# Patient Record
Sex: MALE | Race: WHITE | NOT HISPANIC OR LATINO | Employment: OTHER | URBAN - METROPOLITAN AREA
[De-identification: names, ages, dates, MRNs, and addresses within clinical notes are randomized per-mention and may not be internally consistent; named-entity substitution may affect disease eponyms.]

---

## 2019-06-16 ENCOUNTER — HOSPITAL ENCOUNTER (OUTPATIENT)
Dept: URGENT CARE | Facility: CLINIC | Age: 74
Discharge: HOME | End: 2019-06-16
Attending: NURSE PRACTITIONER

## 2019-06-16 LAB — GLUCOSE BLD-MCNC: 590 MG/DL (ref 70–99)

## 2019-07-02 ENCOUNTER — CONVERSION ENCOUNTER (OUTPATIENT)
Dept: FAMILY MEDICINE CLINIC | Facility: CLINIC | Age: 74
End: 2019-07-02

## 2019-07-02 ENCOUNTER — HOSPITAL ENCOUNTER (OUTPATIENT)
Dept: FAMILY MEDICINE CLINIC | Facility: CLINIC | Age: 74
Discharge: HOME | End: 2019-07-02
Attending: NURSE PRACTITIONER

## 2019-07-02 ENCOUNTER — OFFICE VISIT CONVERTED (OUTPATIENT)
Dept: FAMILY MEDICINE CLINIC | Facility: CLINIC | Age: 74
End: 2019-07-02
Attending: NURSE PRACTITIONER

## 2019-07-02 LAB
ALBUMIN SERPL-MCNC: 4 G/DL (ref 3.5–5)
ALBUMIN/GLOB SERPL: 1.5 {RATIO} (ref 1.4–2.6)
ALP SERPL-CCNC: 69 U/L (ref 56–155)
ALT SERPL-CCNC: 20 U/L (ref 10–40)
ANION GAP SERPL CALC-SCNC: 20 MMOL/L (ref 8–19)
AST SERPL-CCNC: 21 U/L (ref 15–50)
BASOPHILS # BLD AUTO: 0.05 10*3/UL (ref 0–0.2)
BASOPHILS NFR BLD AUTO: 0.8 % (ref 0–3)
BILIRUB SERPL-MCNC: 0.39 MG/DL (ref 0.2–1.3)
BUN SERPL-MCNC: 14 MG/DL (ref 5–25)
BUN/CREAT SERPL: 16 {RATIO} (ref 6–20)
CALCIUM SERPL-MCNC: 8.8 MG/DL (ref 8.7–10.4)
CHLORIDE SERPL-SCNC: 101 MMOL/L (ref 99–111)
CHOLEST SERPL-MCNC: 145 MG/DL (ref 107–200)
CHOLEST/HDLC SERPL: 2.8 {RATIO} (ref 3–6)
CONV ABS IMM GRAN: 0.03 10*3/UL (ref 0–0.2)
CONV CO2: 23 MMOL/L (ref 22–32)
CONV CREATININE URINE, RANDOM: 166.4 MG/DL (ref 10–300)
CONV IMMATURE GRAN: 0.5 % (ref 0–1.8)
CONV MICROALBUM.,U,RANDOM: 49.4 MG/L (ref 0–20)
CONV TOTAL PROTEIN: 6.7 G/DL (ref 6.3–8.2)
CREAT UR-MCNC: 0.89 MG/DL (ref 0.7–1.2)
DEPRECATED RDW RBC AUTO: 46.5 FL (ref 35.1–43.9)
EOSINOPHIL # BLD AUTO: 0.09 10*3/UL (ref 0–0.7)
EOSINOPHIL # BLD AUTO: 1.4 % (ref 0–7)
ERYTHROCYTE [DISTWIDTH] IN BLOOD BY AUTOMATED COUNT: 13.9 % (ref 11.6–14.4)
EST. AVERAGE GLUCOSE BLD GHB EST-MCNC: 200 MG/DL
GFR SERPLBLD BASED ON 1.73 SQ M-ARVRAT: >60 ML/MIN/{1.73_M2}
GLOBULIN UR ELPH-MCNC: 2.7 G/DL (ref 2–3.5)
GLUCOSE SERPL-MCNC: 61 MG/DL (ref 70–99)
HBA1C MFR BLD: 12.5 G/DL (ref 14–18)
HBA1C MFR BLD: 8.6 % (ref 3.5–5.7)
HCT VFR BLD AUTO: 39.8 % (ref 42–52)
HDLC SERPL-MCNC: 52 MG/DL (ref 40–60)
LDLC SERPL CALC-MCNC: 68 MG/DL (ref 70–100)
LYMPHOCYTES # BLD AUTO: 1.04 10*3/UL (ref 1–5)
MCH RBC QN AUTO: 28.5 PG (ref 27–31)
MCHC RBC AUTO-ENTMCNC: 31.4 G/DL (ref 33–37)
MCV RBC AUTO: 90.9 FL (ref 80–96)
MICROALBUMIN/CREAT UR: 29.7 MG/G{CRE} (ref 0–25)
MONOCYTES # BLD AUTO: 0.56 10*3/UL (ref 0.2–1.2)
MONOCYTES NFR BLD AUTO: 8.5 % (ref 3–10)
NEUTROPHILS # BLD AUTO: 4.78 10*3/UL (ref 2–8)
NEUTROPHILS NFR BLD AUTO: 72.9 % (ref 30–85)
NRBC CBCN: 0 % (ref 0–0.7)
OSMOLALITY SERPL CALC.SUM OF ELEC: 288 MOSM/KG (ref 273–304)
PLATELET # BLD AUTO: 232 10*3/UL (ref 130–400)
PMV BLD AUTO: 10.7 FL (ref 9.4–12.4)
POTASSIUM SERPL-SCNC: 4.1 MMOL/L (ref 3.5–5.3)
PSA SERPL-MCNC: 1.53 NG/ML (ref 0–4)
RBC # BLD AUTO: 4.38 10*6/UL (ref 4.7–6.1)
SODIUM SERPL-SCNC: 140 MMOL/L (ref 135–147)
TRIGL SERPL-MCNC: 123 MG/DL (ref 40–150)
TSH SERPL-ACNC: 1.8 M[IU]/L (ref 0.27–4.2)
VARIANT LYMPHS NFR BLD MANUAL: 15.9 % (ref 20–45)
VLDLC SERPL-MCNC: 25 MG/DL (ref 5–37)
WBC # BLD AUTO: 6.55 10*3/UL (ref 4.8–10.8)

## 2019-07-03 LAB
FERRITIN SERPL-MCNC: 24 NG/ML (ref 30–300)
IRON SATN MFR SERPL: 19 % (ref 20–55)
IRON SERPL-MCNC: 73 UG/DL (ref 70–180)
TIBC SERPL-MCNC: 376 UG/DL (ref 245–450)
TRANSFERRIN SERPL-MCNC: 263 MG/DL (ref 215–365)

## 2019-07-09 ENCOUNTER — HOSPITAL ENCOUNTER (OUTPATIENT)
Dept: FAMILY MEDICINE CLINIC | Facility: CLINIC | Age: 74
Discharge: HOME | End: 2019-07-09
Attending: NURSE PRACTITIONER

## 2019-07-09 ENCOUNTER — HOSPITAL ENCOUNTER (OUTPATIENT)
Dept: MRI IMAGING | Facility: HOSPITAL | Age: 74
Discharge: HOME | End: 2019-07-09
Attending: NURSE PRACTITIONER

## 2019-07-09 ENCOUNTER — OFFICE VISIT CONVERTED (OUTPATIENT)
Dept: FAMILY MEDICINE CLINIC | Facility: CLINIC | Age: 74
End: 2019-07-09
Attending: NURSE PRACTITIONER

## 2019-07-09 LAB
FOLATE SERPL-MCNC: 18 NG/ML (ref 4.8–20)
VIT B12 SERPL-MCNC: 197 PG/ML (ref 211–911)

## 2019-07-16 ENCOUNTER — OFFICE VISIT CONVERTED (OUTPATIENT)
Dept: FAMILY MEDICINE CLINIC | Facility: CLINIC | Age: 74
End: 2019-07-16
Attending: NURSE PRACTITIONER

## 2019-07-23 ENCOUNTER — OFFICE VISIT CONVERTED (OUTPATIENT)
Dept: FAMILY MEDICINE CLINIC | Facility: CLINIC | Age: 74
End: 2019-07-23
Attending: NURSE PRACTITIONER

## 2019-07-23 ENCOUNTER — CONVERSION ENCOUNTER (OUTPATIENT)
Dept: FAMILY MEDICINE CLINIC | Facility: CLINIC | Age: 74
End: 2019-07-23

## 2019-08-01 ENCOUNTER — HOSPITAL ENCOUNTER (OUTPATIENT)
Dept: CT IMAGING | Facility: HOSPITAL | Age: 74
Discharge: HOME | End: 2019-08-01
Attending: PHYSICIAN ASSISTANT

## 2019-08-01 ENCOUNTER — OFFICE VISIT CONVERTED (OUTPATIENT)
Dept: NEUROSURGERY | Facility: CLINIC | Age: 74
End: 2019-08-01
Attending: PHYSICIAN ASSISTANT

## 2019-08-07 ENCOUNTER — OFFICE VISIT CONVERTED (OUTPATIENT)
Dept: FAMILY MEDICINE CLINIC | Facility: CLINIC | Age: 74
End: 2019-08-07
Attending: NURSE PRACTITIONER

## 2019-08-21 ENCOUNTER — OFFICE VISIT CONVERTED (OUTPATIENT)
Dept: NEUROSURGERY | Facility: CLINIC | Age: 74
End: 2019-08-21
Attending: NEUROLOGICAL SURGERY

## 2019-08-23 ENCOUNTER — CONVERSION ENCOUNTER (OUTPATIENT)
Dept: FAMILY MEDICINE CLINIC | Facility: CLINIC | Age: 74
End: 2019-08-23

## 2019-08-23 ENCOUNTER — HOSPITAL ENCOUNTER (OUTPATIENT)
Dept: FAMILY MEDICINE CLINIC | Facility: CLINIC | Age: 74
Discharge: HOME | End: 2019-08-23
Attending: NURSE PRACTITIONER

## 2019-08-23 ENCOUNTER — OFFICE VISIT CONVERTED (OUTPATIENT)
Dept: FAMILY MEDICINE CLINIC | Facility: CLINIC | Age: 74
End: 2019-08-23
Attending: NURSE PRACTITIONER

## 2019-08-23 LAB
ALBUMIN SERPL-MCNC: 4.9 G/DL (ref 3.5–5)
ALBUMIN/GLOB SERPL: 1.7 {RATIO} (ref 1.4–2.6)
ALP SERPL-CCNC: 75 U/L (ref 56–155)
ALT SERPL-CCNC: 17 U/L (ref 10–40)
ANION GAP SERPL CALC-SCNC: 21 MMOL/L (ref 8–19)
AST SERPL-CCNC: 18 U/L (ref 15–50)
BILIRUB SERPL-MCNC: 0.5 MG/DL (ref 0.2–1.3)
BUN SERPL-MCNC: 18 MG/DL (ref 5–25)
BUN/CREAT SERPL: 21 {RATIO} (ref 6–20)
CALCIUM SERPL-MCNC: 9.8 MG/DL (ref 8.7–10.4)
CHLORIDE SERPL-SCNC: 104 MMOL/L (ref 99–111)
CONV CO2: 24 MMOL/L (ref 22–32)
CONV TOTAL PROTEIN: 7.8 G/DL (ref 6.3–8.2)
CREAT UR-MCNC: 0.85 MG/DL (ref 0.7–1.2)
EST. AVERAGE GLUCOSE BLD GHB EST-MCNC: 143 MG/DL
GFR SERPLBLD BASED ON 1.73 SQ M-ARVRAT: >60 ML/MIN/{1.73_M2}
GLOBULIN UR ELPH-MCNC: 2.9 G/DL (ref 2–3.5)
GLUCOSE SERPL-MCNC: 86 MG/DL (ref 70–99)
HBA1C MFR BLD: 6.6 % (ref 3.5–5.7)
OSMOLALITY SERPL CALC.SUM OF ELEC: 299 MOSM/KG (ref 273–304)
POTASSIUM SERPL-SCNC: 4.8 MMOL/L (ref 3.5–5.3)
SODIUM SERPL-SCNC: 144 MMOL/L (ref 135–147)

## 2019-08-30 ENCOUNTER — PATIENT OUTREACH - CONVERTED (OUTPATIENT)
Dept: FAMILY MEDICINE CLINIC | Facility: CLINIC | Age: 74
End: 2019-08-30
Attending: NURSE PRACTITIONER

## 2019-09-04 ENCOUNTER — CONVERSION ENCOUNTER (OUTPATIENT)
Dept: FAMILY MEDICINE CLINIC | Facility: CLINIC | Age: 74
End: 2019-09-04

## 2019-09-04 ENCOUNTER — OFFICE VISIT CONVERTED (OUTPATIENT)
Dept: FAMILY MEDICINE CLINIC | Facility: CLINIC | Age: 74
End: 2019-09-04
Attending: NURSE PRACTITIONER

## 2019-09-18 ENCOUNTER — OFFICE VISIT CONVERTED (OUTPATIENT)
Dept: FAMILY MEDICINE CLINIC | Facility: CLINIC | Age: 74
End: 2019-09-18
Attending: NURSE PRACTITIONER

## 2019-09-18 ENCOUNTER — CONVERSION ENCOUNTER (OUTPATIENT)
Dept: FAMILY MEDICINE CLINIC | Facility: CLINIC | Age: 74
End: 2019-09-18

## 2019-09-30 ENCOUNTER — PATIENT OUTREACH - CONVERTED (OUTPATIENT)
Dept: FAMILY MEDICINE CLINIC | Facility: CLINIC | Age: 74
End: 2019-09-30
Attending: NURSE PRACTITIONER

## 2019-10-18 ENCOUNTER — OFFICE VISIT CONVERTED (OUTPATIENT)
Dept: FAMILY MEDICINE CLINIC | Facility: CLINIC | Age: 74
End: 2019-10-18
Attending: NURSE PRACTITIONER

## 2019-10-18 ENCOUNTER — CONVERSION ENCOUNTER (OUTPATIENT)
Dept: FAMILY MEDICINE CLINIC | Facility: CLINIC | Age: 74
End: 2019-10-18

## 2019-10-31 ENCOUNTER — PATIENT OUTREACH - CONVERTED (OUTPATIENT)
Dept: FAMILY MEDICINE CLINIC | Facility: CLINIC | Age: 74
End: 2019-10-31
Attending: NURSE PRACTITIONER

## 2019-11-19 ENCOUNTER — OFFICE VISIT CONVERTED (OUTPATIENT)
Dept: FAMILY MEDICINE CLINIC | Facility: CLINIC | Age: 74
End: 2019-11-19
Attending: NURSE PRACTITIONER

## 2019-11-19 ENCOUNTER — CONVERSION ENCOUNTER (OUTPATIENT)
Dept: FAMILY MEDICINE CLINIC | Facility: CLINIC | Age: 74
End: 2019-11-19

## 2019-11-27 ENCOUNTER — PATIENT OUTREACH - CONVERTED (OUTPATIENT)
Dept: FAMILY MEDICINE CLINIC | Facility: CLINIC | Age: 74
End: 2019-11-27
Attending: NURSE PRACTITIONER

## 2019-12-03 ENCOUNTER — OFFICE VISIT CONVERTED (OUTPATIENT)
Dept: FAMILY MEDICINE CLINIC | Facility: CLINIC | Age: 74
End: 2019-12-03
Attending: NURSE PRACTITIONER

## 2019-12-03 ENCOUNTER — HOSPITAL ENCOUNTER (OUTPATIENT)
Dept: FAMILY MEDICINE CLINIC | Facility: CLINIC | Age: 74
Discharge: HOME | End: 2019-12-03
Attending: NURSE PRACTITIONER

## 2019-12-03 LAB
ALBUMIN SERPL-MCNC: 4.1 G/DL (ref 3.5–5)
ALBUMIN/GLOB SERPL: 1.4 {RATIO} (ref 1.4–2.6)
ALP SERPL-CCNC: 102 U/L (ref 56–155)
ALT SERPL-CCNC: 20 U/L (ref 10–40)
ANION GAP SERPL CALC-SCNC: 15 MMOL/L (ref 8–19)
AST SERPL-CCNC: 19 U/L (ref 15–50)
BILIRUB SERPL-MCNC: 0.46 MG/DL (ref 0.2–1.3)
BUN SERPL-MCNC: 14 MG/DL (ref 5–25)
BUN/CREAT SERPL: 16 {RATIO} (ref 6–20)
CALCIUM SERPL-MCNC: 9.9 MG/DL (ref 8.7–10.4)
CHLORIDE SERPL-SCNC: 97 MMOL/L (ref 99–111)
CONV CO2: 25 MMOL/L (ref 22–32)
CONV TOTAL PROTEIN: 7.1 G/DL (ref 6.3–8.2)
CREAT UR-MCNC: 0.88 MG/DL (ref 0.7–1.2)
EST. AVERAGE GLUCOSE BLD GHB EST-MCNC: 214 MG/DL
GFR SERPLBLD BASED ON 1.73 SQ M-ARVRAT: >60 ML/MIN/{1.73_M2}
GLOBULIN UR ELPH-MCNC: 3 G/DL (ref 2–3.5)
GLUCOSE SERPL-MCNC: 343 MG/DL (ref 70–99)
HBA1C MFR BLD: 9.1 % (ref 3.5–5.7)
OSMOLALITY SERPL CALC.SUM OF ELEC: 288 MOSM/KG (ref 273–304)
POTASSIUM SERPL-SCNC: 4.6 MMOL/L (ref 3.5–5.3)
SODIUM SERPL-SCNC: 132 MMOL/L (ref 135–147)

## 2019-12-11 ENCOUNTER — HOSPITAL ENCOUNTER (OUTPATIENT)
Dept: OTHER | Facility: HOSPITAL | Age: 74
Discharge: HOME | End: 2019-12-11
Attending: NURSE PRACTITIONER

## 2019-12-11 LAB
BASOPHILS # BLD AUTO: 0.07 10*3/UL (ref 0–0.2)
BASOPHILS NFR BLD AUTO: 1 % (ref 0–3)
CONV ABS IMM GRAN: 0.03 10*3/UL (ref 0–0.2)
CONV IMMATURE GRAN: 0.4 % (ref 0–1.8)
DEPRECATED RDW RBC AUTO: 42.4 FL (ref 35.1–43.9)
EOSINOPHIL # BLD AUTO: 0.25 10*3/UL (ref 0–0.7)
EOSINOPHIL # BLD AUTO: 3.5 % (ref 0–7)
ERYTHROCYTE [DISTWIDTH] IN BLOOD BY AUTOMATED COUNT: 12.9 % (ref 11.6–14.4)
HCT VFR BLD AUTO: 38.8 % (ref 42–52)
HGB BLD-MCNC: 12.9 G/DL (ref 14–18)
LYMPHOCYTES # BLD AUTO: 1.94 10*3/UL (ref 1–5)
LYMPHOCYTES NFR BLD AUTO: 27.2 % (ref 20–45)
MCH RBC QN AUTO: 29.5 PG (ref 27–31)
MCHC RBC AUTO-ENTMCNC: 33.2 G/DL (ref 33–37)
MCV RBC AUTO: 88.8 FL (ref 80–96)
MONOCYTES # BLD AUTO: 0.49 10*3/UL (ref 0.2–1.2)
MONOCYTES NFR BLD AUTO: 6.9 % (ref 3–10)
NEUTROPHILS # BLD AUTO: 4.34 10*3/UL (ref 2–8)
NEUTROPHILS NFR BLD AUTO: 61 % (ref 30–85)
NRBC CBCN: 0 % (ref 0–0.7)
PLATELET # BLD AUTO: 190 10*3/UL (ref 130–400)
PMV BLD AUTO: 10.5 FL (ref 9.4–12.4)
RBC # BLD AUTO: 4.37 10*6/UL (ref 4.7–6.1)
WBC # BLD AUTO: 7.12 10*3/UL (ref 4.8–10.8)

## 2019-12-17 ENCOUNTER — HOSPITAL ENCOUNTER (OUTPATIENT)
Dept: FAMILY MEDICINE CLINIC | Facility: CLINIC | Age: 74
Discharge: HOME | End: 2019-12-17
Attending: NURSE PRACTITIONER

## 2019-12-17 LAB
FERRITIN SERPL-MCNC: 75 NG/ML (ref 30–300)
FOLATE SERPL-MCNC: 19.2 NG/ML (ref 4.8–20)
IRON SATN MFR SERPL: 22 % (ref 20–55)
IRON SERPL-MCNC: 74 UG/DL (ref 70–180)
TIBC SERPL-MCNC: 330 UG/DL (ref 245–450)
TRANSFERRIN SERPL-MCNC: 231 MG/DL (ref 215–365)
VIT B12 SERPL-MCNC: 227 PG/ML (ref 211–911)

## 2019-12-30 ENCOUNTER — PATIENT OUTREACH - CONVERTED (OUTPATIENT)
Dept: FAMILY MEDICINE CLINIC | Facility: CLINIC | Age: 74
End: 2019-12-30
Attending: NURSE PRACTITIONER

## 2020-01-01 ENCOUNTER — PATIENT OUTREACH - CONVERTED (OUTPATIENT)
Dept: FAMILY MEDICINE CLINIC | Facility: CLINIC | Age: 75
End: 2020-01-01
Attending: NURSE PRACTITIONER

## 2020-01-01 ENCOUNTER — CONVERSION ENCOUNTER (OUTPATIENT)
Dept: FAMILY MEDICINE CLINIC | Facility: CLINIC | Age: 75
End: 2020-01-01

## 2020-01-01 ENCOUNTER — OFFICE VISIT CONVERTED (OUTPATIENT)
Dept: DIABETES SERVICES | Facility: HOSPITAL | Age: 75
End: 2020-01-01
Attending: NURSE PRACTITIONER

## 2020-01-01 ENCOUNTER — HOSPITAL ENCOUNTER (OUTPATIENT)
Dept: DIABETES SERVICES | Facility: HOSPITAL | Age: 75
Discharge: HOME | End: 2020-11-24
Attending: NURSE PRACTITIONER

## 2020-01-01 ENCOUNTER — OFFICE VISIT CONVERTED (OUTPATIENT)
Dept: CARDIOLOGY | Facility: CLINIC | Age: 75
End: 2020-01-01
Attending: SPECIALIST

## 2020-01-01 ENCOUNTER — HOSPITAL ENCOUNTER (OUTPATIENT)
Dept: DIABETES SERVICES | Facility: HOSPITAL | Age: 75
Discharge: HOME | End: 2020-11-09
Attending: NURSE PRACTITIONER

## 2020-01-01 ENCOUNTER — HOSPITAL ENCOUNTER (OUTPATIENT)
Dept: NUCLEAR MEDICINE | Facility: HOSPITAL | Age: 75
Discharge: HOME | End: 2020-08-19
Attending: SPECIALIST

## 2020-01-01 ENCOUNTER — HOSPITAL ENCOUNTER (OUTPATIENT)
Dept: DIABETES SERVICES | Facility: HOSPITAL | Age: 75
Setting detail: RECURRING SERIES
Discharge: STILL ADMITTED OR ADMINISTRATIVE | End: 2020-12-31
Attending: NURSE PRACTITIONER

## 2020-01-01 ENCOUNTER — HOSPITAL ENCOUNTER (OUTPATIENT)
Dept: FAMILY MEDICINE CLINIC | Facility: CLINIC | Age: 75
Discharge: HOME | End: 2020-12-10
Attending: NURSE PRACTITIONER

## 2020-01-01 ENCOUNTER — HOSPITAL ENCOUNTER (OUTPATIENT)
Dept: DIABETES SERVICES | Facility: HOSPITAL | Age: 75
Discharge: HOME | End: 2020-09-08
Attending: NURSE PRACTITIONER

## 2020-01-01 ENCOUNTER — HOSPITAL ENCOUNTER (OUTPATIENT)
Dept: FAMILY MEDICINE CLINIC | Facility: CLINIC | Age: 75
Discharge: HOME | End: 2020-09-10
Attending: NURSE PRACTITIONER

## 2020-01-01 ENCOUNTER — HOSPITAL ENCOUNTER (OUTPATIENT)
Dept: DIABETES SERVICES | Facility: HOSPITAL | Age: 75
Discharge: HOME | End: 2020-12-16
Attending: NURSE PRACTITIONER

## 2020-01-01 LAB
25(OH)D3 SERPL-MCNC: 29.8 NG/ML (ref 30–100)
ALBUMIN SERPL-MCNC: 3.9 G/DL (ref 3.5–5)
ALBUMIN SERPL-MCNC: 4 G/DL (ref 3.5–5)
ALBUMIN/GLOB SERPL: 1.3 {RATIO} (ref 1.4–2.6)
ALBUMIN/GLOB SERPL: 1.4 {RATIO} (ref 1.4–2.6)
ALP SERPL-CCNC: 108 U/L (ref 56–155)
ALP SERPL-CCNC: 80 U/L (ref 56–155)
ALT SERPL-CCNC: 20 U/L (ref 10–40)
ALT SERPL-CCNC: 21 U/L (ref 10–40)
ANION GAP SERPL CALC-SCNC: 14 MMOL/L (ref 8–19)
ANION GAP SERPL CALC-SCNC: 18 MMOL/L (ref 8–19)
AST SERPL-CCNC: 20 U/L (ref 15–50)
AST SERPL-CCNC: 21 U/L (ref 15–50)
BASOPHILS # BLD AUTO: 0.05 10*3/UL (ref 0–0.2)
BASOPHILS NFR BLD AUTO: 0.8 % (ref 0–3)
BILIRUB SERPL-MCNC: 0.36 MG/DL (ref 0.2–1.3)
BILIRUB SERPL-MCNC: 0.37 MG/DL (ref 0.2–1.3)
BUN SERPL-MCNC: 14 MG/DL (ref 5–25)
BUN SERPL-MCNC: 16 MG/DL (ref 5–25)
BUN/CREAT SERPL: 13 {RATIO} (ref 6–20)
BUN/CREAT SERPL: 16 {RATIO} (ref 6–20)
CALCIUM SERPL-MCNC: 9.1 MG/DL (ref 8.7–10.4)
CALCIUM SERPL-MCNC: 9.4 MG/DL (ref 8.7–10.4)
CHLORIDE SERPL-SCNC: 102 MMOL/L (ref 99–111)
CHLORIDE SERPL-SCNC: 99 MMOL/L (ref 99–111)
CHOLEST SERPL-MCNC: 162 MG/DL (ref 107–200)
CHOLEST/HDLC SERPL: 3.4 {RATIO} (ref 3–6)
CONV ABS IMM GRAN: 0.03 10*3/UL (ref 0–0.2)
CONV CO2: 24 MMOL/L (ref 22–32)
CONV CO2: 26 MMOL/L (ref 22–32)
CONV CREATININE URINE, RANDOM: 143.1 MG/DL (ref 10–300)
CONV IMMATURE GRAN: 0.5 % (ref 0–1.8)
CONV MICROALBUM.,U,RANDOM: <12 MG/L (ref 0–20)
CONV TOTAL PROTEIN: 6.8 G/DL (ref 6.3–8.2)
CONV TOTAL PROTEIN: 6.9 G/DL (ref 6.3–8.2)
CREAT UR-MCNC: 1 MG/DL (ref 0.7–1.2)
CREAT UR-MCNC: 1.11 MG/DL (ref 0.7–1.2)
DEPRECATED RDW RBC AUTO: 44.6 FL (ref 35.1–43.9)
EOSINOPHIL # BLD AUTO: 0.26 10*3/UL (ref 0–0.7)
EOSINOPHIL # BLD AUTO: 4.1 % (ref 0–7)
ERYTHROCYTE [DISTWIDTH] IN BLOOD BY AUTOMATED COUNT: 13.6 % (ref 11.6–14.4)
EST. AVERAGE GLUCOSE BLD GHB EST-MCNC: 194 MG/DL
EST. AVERAGE GLUCOSE BLD GHB EST-MCNC: 229 MG/DL
FERRITIN SERPL-MCNC: 91 NG/ML (ref 30–300)
FOLATE SERPL-MCNC: 15.2 NG/ML (ref 4.8–20)
GFR SERPLBLD BASED ON 1.73 SQ M-ARVRAT: >60 ML/MIN/{1.73_M2}
GFR SERPLBLD BASED ON 1.73 SQ M-ARVRAT: >60 ML/MIN/{1.73_M2}
GLOBULIN UR ELPH-MCNC: 2.8 G/DL (ref 2–3.5)
GLOBULIN UR ELPH-MCNC: 3 G/DL (ref 2–3.5)
GLUCOSE SERPL-MCNC: 280 MG/DL (ref 70–99)
GLUCOSE SERPL-MCNC: 290 MG/DL (ref 70–99)
HBA1C MFR BLD: 8.4 % (ref 3.5–5.7)
HBA1C MFR BLD: 9.6 % (ref 3.5–5.7)
HCT VFR BLD AUTO: 41.5 % (ref 42–52)
HDLC SERPL-MCNC: 48 MG/DL (ref 40–60)
HGB BLD-MCNC: 13.5 G/DL (ref 14–18)
IRON SATN MFR SERPL: 29 % (ref 20–55)
IRON SERPL-MCNC: 95 UG/DL (ref 70–180)
LDLC SERPL CALC-MCNC: 66 MG/DL (ref 70–100)
LYMPHOCYTES # BLD AUTO: 1.97 10*3/UL (ref 1–5)
LYMPHOCYTES NFR BLD AUTO: 31.2 % (ref 20–45)
MCH RBC QN AUTO: 29.5 PG (ref 27–31)
MCHC RBC AUTO-ENTMCNC: 32.5 G/DL (ref 33–37)
MCV RBC AUTO: 90.6 FL (ref 80–96)
MICROALBUMIN/CREAT UR: 8.4 MG/G{CRE} (ref 0–25)
MONOCYTES # BLD AUTO: 0.54 10*3/UL (ref 0.2–1.2)
MONOCYTES NFR BLD AUTO: 8.6 % (ref 3–10)
NEUTROPHILS # BLD AUTO: 3.46 10*3/UL (ref 2–8)
NEUTROPHILS NFR BLD AUTO: 54.8 % (ref 30–85)
NRBC CBCN: 0 % (ref 0–0.7)
OSMOLALITY SERPL CALC.SUM OF ELEC: 291 MOSM/KG (ref 273–304)
OSMOLALITY SERPL CALC.SUM OF ELEC: 299 MOSM/KG (ref 273–304)
PLATELET # BLD AUTO: 199 10*3/UL (ref 130–400)
PMV BLD AUTO: 10.5 FL (ref 9.4–12.4)
POTASSIUM SERPL-SCNC: 4.3 MMOL/L (ref 3.5–5.3)
POTASSIUM SERPL-SCNC: 4.5 MMOL/L (ref 3.5–5.3)
PSA SERPL-MCNC: 1.65 NG/ML (ref 0–4)
RBC # BLD AUTO: 4.58 10*6/UL (ref 4.7–6.1)
SODIUM SERPL-SCNC: 135 MMOL/L (ref 135–147)
SODIUM SERPL-SCNC: 139 MMOL/L (ref 135–147)
TIBC SERPL-MCNC: 325 UG/DL (ref 245–450)
TRANSFERRIN SERPL-MCNC: 227 MG/DL (ref 215–365)
TRIGL SERPL-MCNC: 242 MG/DL (ref 40–150)
TSH SERPL-ACNC: 1.9 M[IU]/L (ref 0.27–4.2)
VIT B12 SERPL-MCNC: 264 PG/ML (ref 211–911)
VIT B12 SERPL-MCNC: 299 PG/ML (ref 211–911)
VLDLC SERPL-MCNC: 48 MG/DL (ref 5–37)
WBC # BLD AUTO: 6.31 10*3/UL (ref 4.8–10.8)

## 2020-01-02 ENCOUNTER — HOSPITAL ENCOUNTER (OUTPATIENT)
Dept: DIABETES SERVICES | Facility: HOSPITAL | Age: 75
Setting detail: RECURRING SERIES
Discharge: HOME | End: 2020-01-31
Attending: NURSE PRACTITIONER

## 2020-01-07 ENCOUNTER — OFFICE VISIT CONVERTED (OUTPATIENT)
Dept: FAMILY MEDICINE CLINIC | Facility: CLINIC | Age: 75
End: 2020-01-07
Attending: NURSE PRACTITIONER

## 2020-01-08 ENCOUNTER — HOSPITAL ENCOUNTER (OUTPATIENT)
Dept: DIABETES SERVICES | Facility: HOSPITAL | Age: 75
Discharge: HOME | End: 2020-01-08
Attending: NURSE PRACTITIONER

## 2020-01-08 ENCOUNTER — OFFICE VISIT CONVERTED (OUTPATIENT)
Dept: DIABETES SERVICES | Facility: HOSPITAL | Age: 75
End: 2020-01-08
Attending: NURSE PRACTITIONER

## 2020-01-28 ENCOUNTER — PATIENT OUTREACH - CONVERTED (OUTPATIENT)
Dept: FAMILY MEDICINE CLINIC | Facility: CLINIC | Age: 75
End: 2020-01-28
Attending: NURSE PRACTITIONER

## 2020-02-06 ENCOUNTER — HOSPITAL ENCOUNTER (OUTPATIENT)
Dept: DIABETES SERVICES | Facility: HOSPITAL | Age: 75
Discharge: HOME | End: 2020-02-06
Attending: NURSE PRACTITIONER

## 2020-02-06 ENCOUNTER — OFFICE VISIT CONVERTED (OUTPATIENT)
Dept: DIABETES SERVICES | Facility: HOSPITAL | Age: 75
End: 2020-02-06
Attending: NURSE PRACTITIONER

## 2020-02-07 ENCOUNTER — OFFICE VISIT CONVERTED (OUTPATIENT)
Dept: FAMILY MEDICINE CLINIC | Facility: CLINIC | Age: 75
End: 2020-02-07
Attending: NURSE PRACTITIONER

## 2020-02-13 ENCOUNTER — HOSPITAL ENCOUNTER (OUTPATIENT)
Dept: CT IMAGING | Facility: HOSPITAL | Age: 75
Discharge: HOME | End: 2020-02-13
Attending: NURSE PRACTITIONER

## 2020-02-26 ENCOUNTER — HOSPITAL ENCOUNTER (OUTPATIENT)
Dept: ULTRASOUND IMAGING | Facility: HOSPITAL | Age: 75
Discharge: HOME | End: 2020-02-26
Attending: NURSE PRACTITIONER

## 2020-02-27 ENCOUNTER — PATIENT OUTREACH - CONVERTED (OUTPATIENT)
Dept: FAMILY MEDICINE CLINIC | Facility: CLINIC | Age: 75
End: 2020-02-27
Attending: NURSE PRACTITIONER

## 2020-03-25 ENCOUNTER — OFFICE VISIT CONVERTED (OUTPATIENT)
Dept: DIABETES SERVICES | Facility: HOSPITAL | Age: 75
End: 2020-03-25
Attending: NURSE PRACTITIONER

## 2020-06-03 ENCOUNTER — HOSPITAL ENCOUNTER (OUTPATIENT)
Dept: OTHER | Facility: HOSPITAL | Age: 75
Discharge: HOME | End: 2020-06-03
Attending: NURSE PRACTITIONER

## 2020-06-03 LAB
EST. AVERAGE GLUCOSE BLD GHB EST-MCNC: 200 MG/DL
GLUCOSE SERPL-MCNC: 100 MG/DL (ref 70–99)
HBA1C MFR BLD: 8.6 % (ref 3.5–5.7)

## 2020-06-04 LAB — C PEPTIDE SERPL-MCNC: 0.7 NG/ML (ref 1.1–4.4)

## 2020-06-17 ENCOUNTER — HOSPITAL ENCOUNTER (OUTPATIENT)
Dept: DIABETES SERVICES | Facility: HOSPITAL | Age: 75
Discharge: HOME | End: 2020-06-17
Attending: NURSE PRACTITIONER

## 2020-06-17 ENCOUNTER — HOSPITAL ENCOUNTER (OUTPATIENT)
Dept: FAMILY MEDICINE CLINIC | Facility: CLINIC | Age: 75
Discharge: HOME | End: 2020-06-17
Attending: NURSE PRACTITIONER

## 2020-06-17 ENCOUNTER — OFFICE VISIT CONVERTED (OUTPATIENT)
Dept: DIABETES SERVICES | Facility: HOSPITAL | Age: 75
End: 2020-06-17
Attending: NURSE PRACTITIONER

## 2020-06-17 ENCOUNTER — OFFICE VISIT CONVERTED (OUTPATIENT)
Dept: FAMILY MEDICINE CLINIC | Facility: CLINIC | Age: 75
End: 2020-06-17
Attending: NURSE PRACTITIONER

## 2020-06-17 LAB
ALBUMIN SERPL-MCNC: 4 G/DL (ref 3.5–5)
ALBUMIN/GLOB SERPL: 1.4 {RATIO} (ref 1.4–2.6)
ALP SERPL-CCNC: 71 U/L (ref 56–155)
ALT SERPL-CCNC: 17 U/L (ref 10–40)
ANION GAP SERPL CALC-SCNC: 15 MMOL/L (ref 8–19)
AST SERPL-CCNC: 17 U/L (ref 15–50)
BASOPHILS # BLD AUTO: 0.05 10*3/UL (ref 0–0.2)
BASOPHILS NFR BLD AUTO: 0.9 % (ref 0–3)
BILIRUB SERPL-MCNC: 0.44 MG/DL (ref 0.2–1.3)
BUN SERPL-MCNC: 21 MG/DL (ref 5–25)
BUN/CREAT SERPL: 21 {RATIO} (ref 6–20)
CALCIUM SERPL-MCNC: 9.1 MG/DL (ref 8.7–10.4)
CHLORIDE SERPL-SCNC: 104 MMOL/L (ref 99–111)
CHOLEST SERPL-MCNC: 143 MG/DL (ref 107–200)
CHOLEST/HDLC SERPL: 2.3 {RATIO} (ref 3–6)
CONV ABS IMM GRAN: 0.01 10*3/UL (ref 0–0.2)
CONV CO2: 25 MMOL/L (ref 22–32)
CONV IMMATURE GRAN: 0.2 % (ref 0–1.8)
CONV TOTAL PROTEIN: 6.8 G/DL (ref 6.3–8.2)
CREAT UR-MCNC: 1 MG/DL (ref 0.7–1.2)
DEPRECATED RDW RBC AUTO: 45.1 FL (ref 35.1–43.9)
EOSINOPHIL # BLD AUTO: 0.22 10*3/UL (ref 0–0.7)
EOSINOPHIL # BLD AUTO: 4 % (ref 0–7)
ERYTHROCYTE [DISTWIDTH] IN BLOOD BY AUTOMATED COUNT: 13.4 % (ref 11.6–14.4)
EST. AVERAGE GLUCOSE BLD GHB EST-MCNC: 209 MG/DL
FERRITIN SERPL-MCNC: 39 NG/ML (ref 30–300)
FOLATE SERPL-MCNC: 18.9 NG/ML (ref 4.8–20)
GFR SERPLBLD BASED ON 1.73 SQ M-ARVRAT: >60 ML/MIN/{1.73_M2}
GLOBULIN UR ELPH-MCNC: 2.8 G/DL (ref 2–3.5)
GLUCOSE SERPL-MCNC: 173 MG/DL (ref 70–99)
HBA1C MFR BLD: 8.9 % (ref 3.5–5.7)
HCT VFR BLD AUTO: 42.6 % (ref 42–52)
HDLC SERPL-MCNC: 61 MG/DL (ref 40–60)
HGB BLD-MCNC: 13.4 G/DL (ref 14–18)
IRON SATN MFR SERPL: 25 % (ref 20–55)
IRON SERPL-MCNC: 100 UG/DL (ref 70–180)
LDLC SERPL CALC-MCNC: 68 MG/DL (ref 70–100)
LYMPHOCYTES # BLD AUTO: 1.61 10*3/UL (ref 1–5)
LYMPHOCYTES NFR BLD AUTO: 29.4 % (ref 20–45)
MCH RBC QN AUTO: 28.8 PG (ref 27–31)
MCHC RBC AUTO-ENTMCNC: 31.5 G/DL (ref 33–37)
MCV RBC AUTO: 91.6 FL (ref 80–96)
MONOCYTES # BLD AUTO: 0.48 10*3/UL (ref 0.2–1.2)
MONOCYTES NFR BLD AUTO: 8.8 % (ref 3–10)
NEUTROPHILS # BLD AUTO: 3.1 10*3/UL (ref 2–8)
NEUTROPHILS NFR BLD AUTO: 56.7 % (ref 30–85)
NRBC CBCN: 0 % (ref 0–0.7)
OSMOLALITY SERPL CALC.SUM OF ELEC: 297 MOSM/KG (ref 273–304)
PLATELET # BLD AUTO: 194 10*3/UL (ref 130–400)
PMV BLD AUTO: 11 FL (ref 9.4–12.4)
POTASSIUM SERPL-SCNC: 4.1 MMOL/L (ref 3.5–5.3)
RBC # BLD AUTO: 4.65 10*6/UL (ref 4.7–6.1)
SODIUM SERPL-SCNC: 140 MMOL/L (ref 135–147)
TIBC SERPL-MCNC: 408 UG/DL (ref 245–450)
TRANSFERRIN SERPL-MCNC: 285 MG/DL (ref 215–365)
TRIGL SERPL-MCNC: 68 MG/DL (ref 40–150)
VIT B12 SERPL-MCNC: 232 PG/ML (ref 211–911)
VLDLC SERPL-MCNC: 14 MG/DL (ref 5–37)
WBC # BLD AUTO: 5.47 10*3/UL (ref 4.8–10.8)

## 2020-06-29 ENCOUNTER — PATIENT OUTREACH - CONVERTED (OUTPATIENT)
Dept: FAMILY MEDICINE CLINIC | Facility: CLINIC | Age: 75
End: 2020-06-29
Attending: NURSE PRACTITIONER

## 2020-07-06 ENCOUNTER — OFFICE VISIT CONVERTED (OUTPATIENT)
Dept: UROLOGY | Facility: CLINIC | Age: 75
End: 2020-07-06
Attending: UROLOGY

## 2020-07-06 ENCOUNTER — CONVERSION ENCOUNTER (OUTPATIENT)
Dept: SURGERY | Facility: CLINIC | Age: 75
End: 2020-07-06

## 2020-07-20 ENCOUNTER — HOSPITAL ENCOUNTER (OUTPATIENT)
Dept: OTHER | Facility: HOSPITAL | Age: 75
Discharge: HOME | End: 2020-07-20
Attending: NURSE PRACTITIONER

## 2020-07-20 LAB
ALBUMIN SERPL-MCNC: 4.2 G/DL (ref 3.5–5)
ALBUMIN/GLOB SERPL: 1.4 {RATIO} (ref 1.4–2.6)
ALP SERPL-CCNC: 86 U/L (ref 56–155)
ALT SERPL-CCNC: 17 U/L (ref 10–40)
ANION GAP SERPL CALC-SCNC: 18 MMOL/L (ref 8–19)
AST SERPL-CCNC: 12 U/L (ref 15–50)
BILIRUB SERPL-MCNC: 0.48 MG/DL (ref 0.2–1.3)
BNP SERPL-MCNC: 463 PG/ML (ref 0–900)
BUN SERPL-MCNC: 15 MG/DL (ref 5–25)
BUN/CREAT SERPL: 14 {RATIO} (ref 6–20)
CALCIUM SERPL-MCNC: 9.7 MG/DL (ref 8.7–10.4)
CHLORIDE SERPL-SCNC: 99 MMOL/L (ref 99–111)
CONV CO2: 26 MMOL/L (ref 22–32)
CONV TOTAL PROTEIN: 7.1 G/DL (ref 6.3–8.2)
CREAT UR-MCNC: 1.07 MG/DL (ref 0.7–1.2)
GFR SERPLBLD BASED ON 1.73 SQ M-ARVRAT: >60 ML/MIN/{1.73_M2}
GLOBULIN UR ELPH-MCNC: 2.9 G/DL (ref 2–3.5)
GLUCOSE SERPL-MCNC: 304 MG/DL (ref 70–99)
OSMOLALITY SERPL CALC.SUM OF ELEC: 300 MOSM/KG (ref 273–304)
POTASSIUM SERPL-SCNC: 4.1 MMOL/L (ref 3.5–5.3)
SODIUM SERPL-SCNC: 139 MMOL/L (ref 135–147)

## 2020-07-21 ENCOUNTER — OFFICE VISIT CONVERTED (OUTPATIENT)
Dept: CARDIOLOGY | Facility: CLINIC | Age: 75
End: 2020-07-21
Attending: SPECIALIST

## 2020-07-21 ENCOUNTER — OFFICE VISIT CONVERTED (OUTPATIENT)
Dept: FAMILY MEDICINE CLINIC | Facility: CLINIC | Age: 75
End: 2020-07-21
Attending: NURSE PRACTITIONER

## 2020-07-28 ENCOUNTER — PATIENT OUTREACH - CONVERTED (OUTPATIENT)
Dept: FAMILY MEDICINE CLINIC | Facility: CLINIC | Age: 75
End: 2020-07-28
Attending: NURSE PRACTITIONER

## 2020-07-29 ENCOUNTER — HOSPITAL ENCOUNTER (OUTPATIENT)
Dept: DIABETES SERVICES | Facility: HOSPITAL | Age: 75
Discharge: HOME | End: 2020-07-29
Attending: NURSE PRACTITIONER

## 2020-07-29 ENCOUNTER — OFFICE VISIT CONVERTED (OUTPATIENT)
Dept: DIABETES SERVICES | Facility: HOSPITAL | Age: 75
End: 2020-07-29
Attending: NURSE PRACTITIONER

## 2021-01-01 ENCOUNTER — APPOINTMENT (OUTPATIENT)
Dept: GENERAL RADIOLOGY | Facility: HOSPITAL | Age: 76
End: 2021-01-01

## 2021-01-01 ENCOUNTER — OFFICE VISIT CONVERTED (OUTPATIENT)
Dept: ORTHOPEDIC SURGERY | Facility: CLINIC | Age: 76
End: 2021-01-01
Attending: ORTHOPAEDIC SURGERY

## 2021-01-01 ENCOUNTER — HOSPITAL ENCOUNTER (INPATIENT)
Facility: HOSPITAL | Age: 76
LOS: 10 days | End: 2021-08-01
Attending: EMERGENCY MEDICINE | Admitting: INTERNAL MEDICINE

## 2021-01-01 ENCOUNTER — TRANSCRIBE ORDERS (OUTPATIENT)
Dept: LAB | Facility: HOSPITAL | Age: 76
End: 2021-01-01

## 2021-01-01 ENCOUNTER — OFFICE VISIT CONVERTED (OUTPATIENT)
Dept: DIABETES SERVICES | Facility: HOSPITAL | Age: 76
End: 2021-01-01
Attending: NURSE PRACTITIONER

## 2021-01-01 ENCOUNTER — HOSPITAL ENCOUNTER (OUTPATIENT)
Facility: HOSPITAL | Age: 76
Discharge: HOME | End: 2021-07-14
Attending: ORTHOPAEDIC SURGERY | Admitting: ORTHOPAEDIC SURGERY

## 2021-01-01 ENCOUNTER — APPOINTMENT (OUTPATIENT)
Dept: DIABETES SERVICES | Facility: HOSPITAL | Age: 76
End: 2021-01-01

## 2021-01-01 ENCOUNTER — HOSPITAL ENCOUNTER (OUTPATIENT)
Dept: DIABETES SERVICES | Facility: HOSPITAL | Age: 76
Discharge: HOME | End: 2021-03-02
Attending: NURSE PRACTITIONER

## 2021-01-01 ENCOUNTER — OFFICE VISIT CONVERTED (OUTPATIENT)
Dept: FAMILY MEDICINE CLINIC | Facility: CLINIC | Age: 76
End: 2021-01-01
Attending: NURSE PRACTITIONER

## 2021-01-01 ENCOUNTER — CONVERSION ENCOUNTER (OUTPATIENT)
Dept: FAMILY MEDICINE CLINIC | Facility: CLINIC | Age: 76
End: 2021-01-01

## 2021-01-01 ENCOUNTER — APPOINTMENT (OUTPATIENT)
Dept: LAB | Facility: HOSPITAL | Age: 76
End: 2021-01-01

## 2021-01-01 ENCOUNTER — CONVERSION ENCOUNTER (OUTPATIENT)
Dept: ORTHOPEDIC SURGERY | Facility: CLINIC | Age: 76
End: 2021-01-01

## 2021-01-01 ENCOUNTER — OFFICE VISIT (OUTPATIENT)
Dept: FAMILY MEDICINE CLINIC | Facility: CLINIC | Age: 76
End: 2021-01-01

## 2021-01-01 ENCOUNTER — HOSPITAL ENCOUNTER (OUTPATIENT)
Dept: DIABETES SERVICES | Facility: HOSPITAL | Age: 76
Discharge: HOME | End: 2021-04-21
Attending: NURSE PRACTITIONER

## 2021-01-01 ENCOUNTER — HOSPITAL ENCOUNTER (OUTPATIENT)
Dept: GENERAL RADIOLOGY | Facility: HOSPITAL | Age: 76
Discharge: HOME | End: 2021-01-12
Attending: NURSE PRACTITIONER

## 2021-01-01 ENCOUNTER — OFFICE VISIT (OUTPATIENT)
Dept: DIABETES SERVICES | Facility: HOSPITAL | Age: 76
End: 2021-01-01

## 2021-01-01 ENCOUNTER — PATIENT OUTREACH (OUTPATIENT)
Dept: CASE MANAGEMENT | Facility: OTHER | Age: 76
End: 2021-01-01

## 2021-01-01 ENCOUNTER — ANESTHESIA (OUTPATIENT)
Dept: PERIOP | Facility: HOSPITAL | Age: 76
End: 2021-01-01

## 2021-01-01 ENCOUNTER — ANESTHESIA EVENT (OUTPATIENT)
Dept: PERIOP | Facility: HOSPITAL | Age: 76
End: 2021-01-01

## 2021-01-01 ENCOUNTER — OFFICE VISIT (OUTPATIENT)
Dept: CARDIOLOGY | Facility: CLINIC | Age: 76
End: 2021-01-01

## 2021-01-01 ENCOUNTER — LAB (OUTPATIENT)
Dept: LAB | Facility: HOSPITAL | Age: 76
End: 2021-01-01

## 2021-01-01 ENCOUNTER — HOSPITAL ENCOUNTER (OUTPATIENT)
Dept: FAMILY MEDICINE CLINIC | Facility: CLINIC | Age: 76
Discharge: HOME | End: 2021-04-15
Attending: NURSE PRACTITIONER

## 2021-01-01 ENCOUNTER — HOSPITAL ENCOUNTER (OUTPATIENT)
Dept: DIABETES SERVICES | Facility: HOSPITAL | Age: 76
Discharge: HOME | End: 2021-05-13
Attending: NURSE PRACTITIONER

## 2021-01-01 ENCOUNTER — HOSPITAL ENCOUNTER (OUTPATIENT)
Dept: GENERAL RADIOLOGY | Facility: HOSPITAL | Age: 76
Discharge: HOME | End: 2021-04-27
Attending: NURSE PRACTITIONER

## 2021-01-01 ENCOUNTER — HOSPITAL ENCOUNTER (OUTPATIENT)
Facility: HOSPITAL | Age: 76
Setting detail: HOSPITAL OUTPATIENT SURGERY
End: 2021-01-01
Attending: ORTHOPAEDIC SURGERY | Admitting: ORTHOPAEDIC SURGERY

## 2021-01-01 ENCOUNTER — APPOINTMENT (OUTPATIENT)
Dept: CT IMAGING | Facility: HOSPITAL | Age: 76
End: 2021-01-01

## 2021-01-01 ENCOUNTER — HOSPITAL ENCOUNTER (OUTPATIENT)
Dept: CT IMAGING | Facility: HOSPITAL | Age: 76
Discharge: HOME | End: 2021-02-26
Attending: NURSE PRACTITIONER

## 2021-01-01 ENCOUNTER — APPOINTMENT (OUTPATIENT)
Dept: NEUROLOGY | Facility: HOSPITAL | Age: 76
End: 2021-01-01

## 2021-01-01 ENCOUNTER — PREP FOR SURGERY (OUTPATIENT)
Dept: OTHER | Facility: HOSPITAL | Age: 76
End: 2021-01-01

## 2021-01-01 ENCOUNTER — HOSPITAL ENCOUNTER (OUTPATIENT)
Dept: DIABETES SERVICES | Facility: HOSPITAL | Age: 76
Discharge: HOME | End: 2021-01-19
Attending: NURSE PRACTITIONER

## 2021-01-01 ENCOUNTER — OFFICE VISIT CONVERTED (OUTPATIENT)
Dept: ORTHOPEDIC SURGERY | Facility: CLINIC | Age: 76
End: 2021-01-01
Attending: PHYSICIAN ASSISTANT

## 2021-01-01 ENCOUNTER — TELEPHONE (OUTPATIENT)
Dept: CARDIOLOGY | Facility: CLINIC | Age: 76
End: 2021-01-01

## 2021-01-01 ENCOUNTER — PRE-ADMISSION TESTING (OUTPATIENT)
Dept: PREADMISSION TESTING | Facility: HOSPITAL | Age: 76
End: 2021-01-01

## 2021-01-01 ENCOUNTER — APPOINTMENT (OUTPATIENT)
Dept: PREADMISSION TESTING | Facility: HOSPITAL | Age: 76
End: 2021-01-01

## 2021-01-01 VITALS
DIASTOLIC BLOOD PRESSURE: 63 MMHG | BODY MASS INDEX: 37.59 KG/M2 | SYSTOLIC BLOOD PRESSURE: 152 MMHG | HEIGHT: 68 IN | WEIGHT: 248 LBS

## 2021-01-01 VITALS — BODY MASS INDEX: 36.73 KG/M2 | HEART RATE: 77 BPM | WEIGHT: 242.37 LBS | OXYGEN SATURATION: 98 % | HEIGHT: 68 IN

## 2021-01-01 VITALS
TEMPERATURE: 97.8 F | DIASTOLIC BLOOD PRESSURE: 78 MMHG | DIASTOLIC BLOOD PRESSURE: 72 MMHG | BODY MASS INDEX: 35.85 KG/M2 | BODY MASS INDEX: 36.51 KG/M2 | HEART RATE: 56 BPM | RESPIRATION RATE: 20 BRPM | TEMPERATURE: 98.4 F | TEMPERATURE: 98.6 F | DIASTOLIC BLOOD PRESSURE: 86 MMHG | HEART RATE: 78 BPM | BODY MASS INDEX: 36.93 KG/M2 | WEIGHT: 242.06 LBS | RESPIRATION RATE: 20 BRPM | HEIGHT: 69 IN | OXYGEN SATURATION: 95 % | RESPIRATION RATE: 22 BRPM | TEMPERATURE: 97.6 F | SYSTOLIC BLOOD PRESSURE: 120 MMHG | BODY MASS INDEX: 35.62 KG/M2 | SYSTOLIC BLOOD PRESSURE: 128 MMHG | RESPIRATION RATE: 18 BRPM | HEIGHT: 69 IN | WEIGHT: 234.79 LBS | RESPIRATION RATE: 18 BRPM | HEIGHT: 69 IN | WEIGHT: 240.52 LBS | HEIGHT: 69 IN | BODY MASS INDEX: 37.03 KG/M2 | WEIGHT: 246.47 LBS | HEART RATE: 82 BPM | SYSTOLIC BLOOD PRESSURE: 140 MMHG | SYSTOLIC BLOOD PRESSURE: 136 MMHG | HEART RATE: 81 BPM | SYSTOLIC BLOOD PRESSURE: 148 MMHG | OXYGEN SATURATION: 97 % | DIASTOLIC BLOOD PRESSURE: 86 MMHG | HEIGHT: 69 IN | SYSTOLIC BLOOD PRESSURE: 140 MMHG | WEIGHT: 249.34 LBS | BODY MASS INDEX: 35.82 KG/M2 | SYSTOLIC BLOOD PRESSURE: 124 MMHG | HEART RATE: 86 BPM | OXYGEN SATURATION: 96 % | BODY MASS INDEX: 34.78 KG/M2 | OXYGEN SATURATION: 99 % | HEART RATE: 71 BPM | HEIGHT: 69 IN | RESPIRATION RATE: 18 BRPM | RESPIRATION RATE: 18 BRPM | HEIGHT: 69 IN | DIASTOLIC BLOOD PRESSURE: 70 MMHG | TEMPERATURE: 99.3 F | OXYGEN SATURATION: 97 % | DIASTOLIC BLOOD PRESSURE: 82 MMHG | DIASTOLIC BLOOD PRESSURE: 78 MMHG | WEIGHT: 250 LBS | TEMPERATURE: 98.1 F | OXYGEN SATURATION: 98 % | WEIGHT: 241.84 LBS

## 2021-01-01 VITALS
BODY MASS INDEX: 38.51 KG/M2 | SYSTOLIC BLOOD PRESSURE: 128 MMHG | HEIGHT: 68 IN | DIASTOLIC BLOOD PRESSURE: 72 MMHG | WEIGHT: 254.12 LBS | HEART RATE: 71 BPM | OXYGEN SATURATION: 97 % | TEMPERATURE: 97.5 F

## 2021-01-01 VITALS
TEMPERATURE: 98 F | WEIGHT: 216.5 LBS | OXYGEN SATURATION: 96 % | DIASTOLIC BLOOD PRESSURE: 76 MMHG | SYSTOLIC BLOOD PRESSURE: 130 MMHG | HEIGHT: 68 IN | BODY MASS INDEX: 32.81 KG/M2 | HEART RATE: 74 BPM

## 2021-01-01 VITALS
HEART RATE: 64 BPM | HEIGHT: 68 IN | DIASTOLIC BLOOD PRESSURE: 78 MMHG | BODY MASS INDEX: 36.83 KG/M2 | WEIGHT: 243 LBS | SYSTOLIC BLOOD PRESSURE: 162 MMHG

## 2021-01-01 VITALS
HEART RATE: 64 BPM | RESPIRATION RATE: 16 BRPM | TEMPERATURE: 97.8 F | DIASTOLIC BLOOD PRESSURE: 82 MMHG | WEIGHT: 229 LBS | SYSTOLIC BLOOD PRESSURE: 138 MMHG | HEIGHT: 68 IN | OXYGEN SATURATION: 96 % | BODY MASS INDEX: 34.71 KG/M2

## 2021-01-01 VITALS
TEMPERATURE: 98.1 F | HEIGHT: 68 IN | DIASTOLIC BLOOD PRESSURE: 80 MMHG | BODY MASS INDEX: 34.48 KG/M2 | OXYGEN SATURATION: 97 % | WEIGHT: 227.5 LBS | SYSTOLIC BLOOD PRESSURE: 140 MMHG | HEART RATE: 70 BPM

## 2021-01-01 VITALS
DIASTOLIC BLOOD PRESSURE: 72 MMHG | WEIGHT: 236.37 LBS | BODY MASS INDEX: 35.82 KG/M2 | OXYGEN SATURATION: 94 % | HEART RATE: 76 BPM | TEMPERATURE: 97.6 F | HEIGHT: 68 IN | SYSTOLIC BLOOD PRESSURE: 138 MMHG

## 2021-01-01 VITALS
BODY MASS INDEX: 34.29 KG/M2 | TEMPERATURE: 98.1 F | DIASTOLIC BLOOD PRESSURE: 76 MMHG | HEIGHT: 68 IN | HEART RATE: 68 BPM | WEIGHT: 226.25 LBS | SYSTOLIC BLOOD PRESSURE: 148 MMHG | OXYGEN SATURATION: 96 %

## 2021-01-01 VITALS
WEIGHT: 230 LBS | DIASTOLIC BLOOD PRESSURE: 78 MMHG | SYSTOLIC BLOOD PRESSURE: 140 MMHG | HEART RATE: 71 BPM | TEMPERATURE: 98.1 F | BODY MASS INDEX: 34.86 KG/M2 | HEIGHT: 68 IN | OXYGEN SATURATION: 97 %

## 2021-01-01 VITALS
HEART RATE: 59 BPM | OXYGEN SATURATION: 97 % | WEIGHT: 245 LBS | BODY MASS INDEX: 37.13 KG/M2 | HEIGHT: 68 IN | DIASTOLIC BLOOD PRESSURE: 84 MMHG | TEMPERATURE: 98.2 F | SYSTOLIC BLOOD PRESSURE: 132 MMHG

## 2021-01-01 VITALS
SYSTOLIC BLOOD PRESSURE: 138 MMHG | BODY MASS INDEX: 37.19 KG/M2 | DIASTOLIC BLOOD PRESSURE: 76 MMHG | OXYGEN SATURATION: 99 % | TEMPERATURE: 97.3 F | HEIGHT: 68 IN | HEART RATE: 61 BPM | WEIGHT: 245.37 LBS

## 2021-01-01 VITALS
DIASTOLIC BLOOD PRESSURE: 72 MMHG | HEIGHT: 68 IN | BODY MASS INDEX: 37.19 KG/M2 | HEIGHT: 69 IN | WEIGHT: 244.49 LBS | WEIGHT: 245.37 LBS | SYSTOLIC BLOOD PRESSURE: 118 MMHG | DIASTOLIC BLOOD PRESSURE: 62 MMHG | BODY MASS INDEX: 36.21 KG/M2 | RESPIRATION RATE: 20 BRPM | WEIGHT: 211 LBS | BODY MASS INDEX: 31.98 KG/M2 | SYSTOLIC BLOOD PRESSURE: 140 MMHG | HEIGHT: 68 IN | RESPIRATION RATE: 22 BRPM

## 2021-01-01 VITALS
WEIGHT: 231.7 LBS | OXYGEN SATURATION: 100 % | TEMPERATURE: 96.4 F | DIASTOLIC BLOOD PRESSURE: 68 MMHG | HEIGHT: 68 IN | HEART RATE: 76 BPM | BODY MASS INDEX: 35.12 KG/M2 | RESPIRATION RATE: 18 BRPM | SYSTOLIC BLOOD PRESSURE: 130 MMHG

## 2021-01-01 VITALS
TEMPERATURE: 97.8 F | RESPIRATION RATE: 16 BRPM | HEIGHT: 68 IN | BODY MASS INDEX: 32.43 KG/M2 | HEART RATE: 64 BPM | OXYGEN SATURATION: 97 % | SYSTOLIC BLOOD PRESSURE: 118 MMHG | DIASTOLIC BLOOD PRESSURE: 78 MMHG | WEIGHT: 214 LBS

## 2021-01-01 VITALS
HEIGHT: 68 IN | TEMPERATURE: 98.9 F | OXYGEN SATURATION: 96 % | HEART RATE: 69 BPM | SYSTOLIC BLOOD PRESSURE: 142 MMHG | DIASTOLIC BLOOD PRESSURE: 78 MMHG | WEIGHT: 218.12 LBS | BODY MASS INDEX: 33.06 KG/M2

## 2021-01-01 VITALS
SYSTOLIC BLOOD PRESSURE: 90 MMHG | TEMPERATURE: 98.8 F | DIASTOLIC BLOOD PRESSURE: 71 MMHG | OXYGEN SATURATION: 87 % | RESPIRATION RATE: 30 BRPM | HEART RATE: 104 BPM | HEIGHT: 68 IN | WEIGHT: 232.81 LBS | BODY MASS INDEX: 35.28 KG/M2

## 2021-01-01 VITALS
SYSTOLIC BLOOD PRESSURE: 144 MMHG | DIASTOLIC BLOOD PRESSURE: 80 MMHG | TEMPERATURE: 98.1 F | OXYGEN SATURATION: 96 % | WEIGHT: 213.37 LBS | BODY MASS INDEX: 32.34 KG/M2 | HEIGHT: 68 IN | HEART RATE: 68 BPM

## 2021-01-01 VITALS
BODY MASS INDEX: 32.66 KG/M2 | RESPIRATION RATE: 16 BRPM | WEIGHT: 215.5 LBS | HEART RATE: 64 BPM | SYSTOLIC BLOOD PRESSURE: 128 MMHG | HEIGHT: 68 IN | TEMPERATURE: 97 F | DIASTOLIC BLOOD PRESSURE: 70 MMHG | OXYGEN SATURATION: 96 %

## 2021-01-01 VITALS — WEIGHT: 235 LBS | HEIGHT: 68 IN | BODY MASS INDEX: 35.61 KG/M2

## 2021-01-01 VITALS
HEART RATE: 62 BPM | BODY MASS INDEX: 33.8 KG/M2 | SYSTOLIC BLOOD PRESSURE: 141 MMHG | HEIGHT: 68 IN | DIASTOLIC BLOOD PRESSURE: 80 MMHG | WEIGHT: 223 LBS | OXYGEN SATURATION: 100 %

## 2021-01-01 VITALS — DIASTOLIC BLOOD PRESSURE: 82 MMHG | SYSTOLIC BLOOD PRESSURE: 140 MMHG

## 2021-01-01 VITALS
HEART RATE: 74 BPM | DIASTOLIC BLOOD PRESSURE: 84 MMHG | SYSTOLIC BLOOD PRESSURE: 124 MMHG | WEIGHT: 244.25 LBS | HEIGHT: 68 IN | BODY MASS INDEX: 37.02 KG/M2 | TEMPERATURE: 97.6 F | OXYGEN SATURATION: 98 %

## 2021-01-01 VITALS
BODY MASS INDEX: 37.02 KG/M2 | WEIGHT: 244.25 LBS | HEIGHT: 68 IN | OXYGEN SATURATION: 97 % | DIASTOLIC BLOOD PRESSURE: 74 MMHG | HEART RATE: 67 BPM | TEMPERATURE: 97 F | SYSTOLIC BLOOD PRESSURE: 140 MMHG

## 2021-01-01 VITALS
SYSTOLIC BLOOD PRESSURE: 148 MMHG | HEART RATE: 64 BPM | DIASTOLIC BLOOD PRESSURE: 84 MMHG | WEIGHT: 248 LBS | BODY MASS INDEX: 37.59 KG/M2 | HEIGHT: 68 IN

## 2021-01-01 VITALS
HEIGHT: 68 IN | BODY MASS INDEX: 37.62 KG/M2 | DIASTOLIC BLOOD PRESSURE: 64 MMHG | OXYGEN SATURATION: 95 % | SYSTOLIC BLOOD PRESSURE: 136 MMHG | HEART RATE: 55 BPM | WEIGHT: 248.24 LBS | RESPIRATION RATE: 14 BRPM | TEMPERATURE: 96.4 F

## 2021-01-01 VITALS
DIASTOLIC BLOOD PRESSURE: 74 MMHG | WEIGHT: 247 LBS | HEART RATE: 74 BPM | HEIGHT: 68 IN | BODY MASS INDEX: 37.44 KG/M2 | SYSTOLIC BLOOD PRESSURE: 148 MMHG

## 2021-01-01 VITALS
HEART RATE: 59 BPM | OXYGEN SATURATION: 97 % | BODY MASS INDEX: 33.8 KG/M2 | WEIGHT: 223 LBS | SYSTOLIC BLOOD PRESSURE: 124 MMHG | HEIGHT: 68 IN | DIASTOLIC BLOOD PRESSURE: 74 MMHG | RESPIRATION RATE: 16 BRPM | TEMPERATURE: 97.9 F

## 2021-01-01 VITALS
HEIGHT: 68 IN | BODY MASS INDEX: 34.18 KG/M2 | WEIGHT: 225.56 LBS | DIASTOLIC BLOOD PRESSURE: 84 MMHG | SYSTOLIC BLOOD PRESSURE: 167 MMHG

## 2021-01-01 VITALS
OXYGEN SATURATION: 96 % | WEIGHT: 212.5 LBS | HEART RATE: 69 BPM | SYSTOLIC BLOOD PRESSURE: 136 MMHG | BODY MASS INDEX: 32.21 KG/M2 | DIASTOLIC BLOOD PRESSURE: 82 MMHG | TEMPERATURE: 98.2 F | HEIGHT: 68 IN

## 2021-01-01 VITALS
HEART RATE: 84 BPM | HEIGHT: 68 IN | WEIGHT: 247.8 LBS | SYSTOLIC BLOOD PRESSURE: 132 MMHG | TEMPERATURE: 97.2 F | DIASTOLIC BLOOD PRESSURE: 80 MMHG | BODY MASS INDEX: 37.56 KG/M2 | OXYGEN SATURATION: 94 %

## 2021-01-01 VITALS
BODY MASS INDEX: 36.83 KG/M2 | HEIGHT: 69 IN | DIASTOLIC BLOOD PRESSURE: 90 MMHG | OXYGEN SATURATION: 97 % | HEART RATE: 78 BPM | WEIGHT: 248.68 LBS | TEMPERATURE: 98.6 F | SYSTOLIC BLOOD PRESSURE: 146 MMHG

## 2021-01-01 VITALS
HEIGHT: 68 IN | SYSTOLIC BLOOD PRESSURE: 122 MMHG | HEART RATE: 75 BPM | OXYGEN SATURATION: 96 % | WEIGHT: 251.25 LBS | TEMPERATURE: 96.7 F | BODY MASS INDEX: 38.08 KG/M2 | DIASTOLIC BLOOD PRESSURE: 80 MMHG

## 2021-01-01 VITALS
HEIGHT: 68 IN | SYSTOLIC BLOOD PRESSURE: 148 MMHG | OXYGEN SATURATION: 98 % | WEIGHT: 231 LBS | DIASTOLIC BLOOD PRESSURE: 88 MMHG | BODY MASS INDEX: 35.01 KG/M2 | RESPIRATION RATE: 16 BRPM | HEART RATE: 63 BPM | TEMPERATURE: 97.5 F

## 2021-01-01 DIAGNOSIS — Z79.4 DIABETES MELLITUS DUE TO UNDERLYING CONDITION WITH HYPERGLYCEMIA, WITH LONG-TERM CURRENT USE OF INSULIN (HCC): ICD-10-CM

## 2021-01-01 DIAGNOSIS — I10 HYPERTENSION, ESSENTIAL: Primary | ICD-10-CM

## 2021-01-01 DIAGNOSIS — J18.9 PNEUMONIA OF BOTH LOWER LOBES DUE TO INFECTIOUS ORGANISM: Primary | ICD-10-CM

## 2021-01-01 DIAGNOSIS — E78.5 HYPERLIPIDEMIA, UNSPECIFIED HYPERLIPIDEMIA TYPE: Primary | ICD-10-CM

## 2021-01-01 DIAGNOSIS — Z96.651 AFTERCARE FOLLOWING RIGHT KNEE JOINT REPLACEMENT SURGERY: Primary | ICD-10-CM

## 2021-01-01 DIAGNOSIS — R06.02 SHORTNESS OF BREATH: ICD-10-CM

## 2021-01-01 DIAGNOSIS — Z01.818 PRE-OP TESTING: ICD-10-CM

## 2021-01-01 DIAGNOSIS — J43.8 OTHER EMPHYSEMA (HCC): ICD-10-CM

## 2021-01-01 DIAGNOSIS — E10.43 TYPE 1 DIABETES MELLITUS WITH DIABETIC AUTONOMIC NEUROPATHY (HCC): ICD-10-CM

## 2021-01-01 DIAGNOSIS — E08.65 DIABETES MELLITUS DUE TO UNDERLYING CONDITION WITH HYPERGLYCEMIA (HCC): ICD-10-CM

## 2021-01-01 DIAGNOSIS — M17.11 OSTEOARTHRITIS OF RIGHT KNEE: Primary | ICD-10-CM

## 2021-01-01 DIAGNOSIS — Z47.1 AFTERCARE FOLLOWING RIGHT KNEE JOINT REPLACEMENT SURGERY: Primary | ICD-10-CM

## 2021-01-01 DIAGNOSIS — I10 HYPERTENSION, ESSENTIAL: ICD-10-CM

## 2021-01-01 DIAGNOSIS — R53.1 WEAKNESS: ICD-10-CM

## 2021-01-01 DIAGNOSIS — R79.1 ABNORMAL COAGULATION PROFILE: ICD-10-CM

## 2021-01-01 DIAGNOSIS — M17.11 OSTEOARTHRITIS OF RIGHT KNEE: ICD-10-CM

## 2021-01-01 DIAGNOSIS — E55.9 VITAMIN D DEFICIENCY: ICD-10-CM

## 2021-01-01 DIAGNOSIS — E78.00 HIGH CHOLESTEROL: ICD-10-CM

## 2021-01-01 DIAGNOSIS — Z78.9 DECREASED ACTIVITIES OF DAILY LIVING (ADL): ICD-10-CM

## 2021-01-01 DIAGNOSIS — Z01.818 PRE-OP TESTING: Primary | ICD-10-CM

## 2021-01-01 DIAGNOSIS — E08.65 DIABETES MELLITUS DUE TO UNDERLYING CONDITION WITH HYPERGLYCEMIA, WITH LONG-TERM CURRENT USE OF INSULIN (HCC): ICD-10-CM

## 2021-01-01 DIAGNOSIS — E53.8 VITAMIN B12 DEFICIENCY: ICD-10-CM

## 2021-01-01 DIAGNOSIS — J44.9 CHRONIC OBSTRUCTIVE PULMONARY DISEASE, UNSPECIFIED COPD TYPE (HCC): Primary | ICD-10-CM

## 2021-01-01 DIAGNOSIS — E10.65 UNCONTROLLED TYPE 1 DIABETES MELLITUS WITH HYPERGLYCEMIA (HCC): Primary | ICD-10-CM

## 2021-01-01 LAB
25(OH)D3 SERPL-MCNC: 26.4 NG/ML
ACETONE BLD QL: ABNORMAL
ALBUMIN SERPL-MCNC: 2.9 G/DL (ref 3.5–5.2)
ALBUMIN SERPL-MCNC: 2.9 G/DL (ref 3.5–5.2)
ALBUMIN SERPL-MCNC: 3 G/DL (ref 3.5–5.2)
ALBUMIN SERPL-MCNC: 3.1 G/DL (ref 3.5–5.2)
ALBUMIN SERPL-MCNC: 3.1 G/DL (ref 3.5–5.2)
ALBUMIN SERPL-MCNC: 3.3 G/DL (ref 3.5–5.2)
ALBUMIN SERPL-MCNC: 3.8 G/DL (ref 3.5–5.2)
ALBUMIN SERPL-MCNC: 4.2 G/DL (ref 3.5–5.2)
ALBUMIN/GLOB SERPL: 1 G/DL
ALBUMIN/GLOB SERPL: 1.1 G/DL
ALBUMIN/GLOB SERPL: 1.1 G/DL
ALBUMIN/GLOB SERPL: 1.3 G/DL
ALBUMIN/GLOB SERPL: 1.7 G/DL
ALP SERPL-CCNC: 62 U/L (ref 39–117)
ALP SERPL-CCNC: 67 U/L (ref 39–117)
ALP SERPL-CCNC: 70 U/L (ref 39–117)
ALP SERPL-CCNC: 73 U/L (ref 39–117)
ALP SERPL-CCNC: 79 U/L (ref 39–117)
ALP SERPL-CCNC: 83 U/L (ref 39–117)
ALP SERPL-CCNC: 85 U/L (ref 39–117)
ALT SERPL W P-5'-P-CCNC: 19 U/L (ref 1–41)
ALT SERPL W P-5'-P-CCNC: 19 U/L (ref 1–41)
ALT SERPL W P-5'-P-CCNC: 20 U/L (ref 1–41)
ALT SERPL W P-5'-P-CCNC: 24 U/L (ref 1–41)
ALT SERPL W P-5'-P-CCNC: 30 U/L (ref 1–41)
ALT SERPL W P-5'-P-CCNC: 33 U/L (ref 1–41)
ALT SERPL W P-5'-P-CCNC: 33 U/L (ref 1–41)
ANION GAP SERPL CALCULATED.3IONS-SCNC: 10 MMOL/L (ref 5–15)
ANION GAP SERPL CALCULATED.3IONS-SCNC: 10.4 MMOL/L (ref 5–15)
ANION GAP SERPL CALCULATED.3IONS-SCNC: 10.6 MMOL/L (ref 5–15)
ANION GAP SERPL CALCULATED.3IONS-SCNC: 10.9 MMOL/L (ref 5–15)
ANION GAP SERPL CALCULATED.3IONS-SCNC: 11.3 MMOL/L (ref 5–15)
ANION GAP SERPL CALCULATED.3IONS-SCNC: 11.7 MMOL/L (ref 5–15)
ANION GAP SERPL CALCULATED.3IONS-SCNC: 11.8 MMOL/L (ref 5–15)
ANION GAP SERPL CALCULATED.3IONS-SCNC: 11.8 MMOL/L (ref 5–15)
ANION GAP SERPL CALCULATED.3IONS-SCNC: 12.4 MMOL/L (ref 5–15)
ANION GAP SERPL CALCULATED.3IONS-SCNC: 7.6 MMOL/L (ref 5–15)
ANION GAP SERPL CALCULATED.3IONS-SCNC: 8.3 MMOL/L (ref 5–15)
ANION GAP SERPL CALCULATED.3IONS-SCNC: 9.2 MMOL/L (ref 5–15)
ANION GAP SERPL CALCULATED.3IONS-SCNC: 9.5 MMOL/L (ref 5–15)
ANION GAP SERPL CALCULATED.3IONS-SCNC: 9.7 MMOL/L (ref 5–15)
ANION GAP SERPL CALCULATED.3IONS-SCNC: 9.9 MMOL/L (ref 5–15)
APTT PPP: 22.8 SECONDS (ref 22.2–34.2)
ARTERIAL PATENCY WRIST A: POSITIVE
AST SERPL-CCNC: 23 U/L (ref 1–40)
AST SERPL-CCNC: 25 U/L (ref 1–40)
AST SERPL-CCNC: 26 U/L (ref 1–40)
AST SERPL-CCNC: 30 U/L (ref 1–40)
AST SERPL-CCNC: 31 U/L (ref 1–40)
AST SERPL-CCNC: 34 U/L (ref 1–40)
AST SERPL-CCNC: 39 U/L (ref 1–40)
BACTERIA SPEC AEROBE CULT: NO GROWTH
BACTERIA SPEC AEROBE CULT: NORMAL
BACTERIA SPEC AEROBE CULT: NORMAL
BACTERIA SPEC RESP CULT: NORMAL
BACTERIA UR QL AUTO: ABNORMAL /HPF
BASE EXCESS BLDA CALC-SCNC: -6 MMOL/L (ref -2–2)
BASE EXCESS BLDA CALC-SCNC: -7.6 MMOL/L (ref -2–2)
BASOPHILS # BLD AUTO: 0 10*3/MM3 (ref 0–0.2)
BASOPHILS # BLD AUTO: 0.01 10*3/MM3 (ref 0–0.2)
BASOPHILS # BLD AUTO: 0.01 10*3/MM3 (ref 0–0.2)
BASOPHILS # BLD AUTO: 0.02 10*3/MM3 (ref 0–0.2)
BASOPHILS # BLD AUTO: 0.02 10*3/MM3 (ref 0–0.2)
BASOPHILS # BLD AUTO: 0.03 10*3/MM3 (ref 0–0.2)
BASOPHILS # BLD AUTO: 0.07 10*3/MM3 (ref 0–0.2)
BASOPHILS NFR BLD AUTO: 0 % (ref 0–1.5)
BASOPHILS NFR BLD AUTO: 0.1 % (ref 0–1.5)
BASOPHILS NFR BLD AUTO: 0.2 % (ref 0–1.5)
BASOPHILS NFR BLD AUTO: 0.3 % (ref 0–1.5)
BASOPHILS NFR BLD AUTO: 0.3 % (ref 0–1.5)
BASOPHILS NFR BLD AUTO: 0.4 % (ref 0–1.5)
BASOPHILS NFR BLD AUTO: 1 % (ref 0–1.5)
BDY SITE: ABNORMAL
BDY SITE: ABNORMAL
BILIRUB SERPL-MCNC: 0.2 MG/DL (ref 0–1.2)
BILIRUB SERPL-MCNC: 0.2 MG/DL (ref 0–1.2)
BILIRUB SERPL-MCNC: 0.3 MG/DL (ref 0–1.2)
BILIRUB SERPL-MCNC: 0.5 MG/DL (ref 0–1.2)
BILIRUB UR QL STRIP: NEGATIVE
BUN SERPL-MCNC: 14 MG/DL (ref 8–23)
BUN SERPL-MCNC: 20 MG/DL (ref 8–23)
BUN SERPL-MCNC: 21 MG/DL (ref 8–23)
BUN SERPL-MCNC: 21 MG/DL (ref 8–23)
BUN SERPL-MCNC: 22 MG/DL (ref 8–23)
BUN SERPL-MCNC: 23 MG/DL (ref 8–23)
BUN SERPL-MCNC: 23 MG/DL (ref 8–23)
BUN SERPL-MCNC: 24 MG/DL (ref 8–23)
BUN SERPL-MCNC: 34 MG/DL (ref 8–23)
BUN SERPL-MCNC: 37 MG/DL (ref 8–23)
BUN SERPL-MCNC: 38 MG/DL (ref 8–23)
BUN SERPL-MCNC: 39 MG/DL (ref 8–23)
BUN SERPL-MCNC: 40 MG/DL (ref 8–23)
BUN SERPL-MCNC: 40 MG/DL (ref 8–23)
BUN SERPL-MCNC: 41 MG/DL (ref 8–23)
BUN/CREAT SERPL: 13.1 (ref 7–25)
BUN/CREAT SERPL: 16 (ref 7–25)
BUN/CREAT SERPL: 17.5 (ref 7–25)
BUN/CREAT SERPL: 19.8 (ref 7–25)
BUN/CREAT SERPL: 20 (ref 7–25)
BUN/CREAT SERPL: 20.6 (ref 7–25)
BUN/CREAT SERPL: 21.2 (ref 7–25)
BUN/CREAT SERPL: 23 (ref 7–25)
BUN/CREAT SERPL: 31.8 (ref 7–25)
BUN/CREAT SERPL: 34.8 (ref 7–25)
BUN/CREAT SERPL: 34.9 (ref 7–25)
BUN/CREAT SERPL: 36.6 (ref 7–25)
BUN/CREAT SERPL: 37.1 (ref 7–25)
BUN/CREAT SERPL: 41 (ref 7–25)
BUN/CREAT SERPL: 43 (ref 7–25)
CALCIUM SPEC-SCNC: 7.7 MG/DL (ref 8.6–10.5)
CALCIUM SPEC-SCNC: 7.9 MG/DL (ref 8.6–10.5)
CALCIUM SPEC-SCNC: 7.9 MG/DL (ref 8.6–10.5)
CALCIUM SPEC-SCNC: 8 MG/DL (ref 8.6–10.5)
CALCIUM SPEC-SCNC: 8.1 MG/DL (ref 8.6–10.5)
CALCIUM SPEC-SCNC: 8.3 MG/DL (ref 8.6–10.5)
CALCIUM SPEC-SCNC: 8.4 MG/DL (ref 8.6–10.5)
CALCIUM SPEC-SCNC: 8.4 MG/DL (ref 8.6–10.5)
CALCIUM SPEC-SCNC: 8.5 MG/DL (ref 8.6–10.5)
CALCIUM SPEC-SCNC: 8.6 MG/DL (ref 8.6–10.5)
CALCIUM SPEC-SCNC: 8.6 MG/DL (ref 8.6–10.5)
CALCIUM SPEC-SCNC: 8.7 MG/DL (ref 8.6–10.5)
CALCIUM SPEC-SCNC: 9.1 MG/DL (ref 8.6–10.5)
CHLORIDE SERPL-SCNC: 100 MMOL/L (ref 98–107)
CHLORIDE SERPL-SCNC: 100 MMOL/L (ref 98–107)
CHLORIDE SERPL-SCNC: 101 MMOL/L (ref 98–107)
CHLORIDE SERPL-SCNC: 102 MMOL/L (ref 98–107)
CHLORIDE SERPL-SCNC: 103 MMOL/L (ref 98–107)
CHLORIDE SERPL-SCNC: 103 MMOL/L (ref 98–107)
CHLORIDE SERPL-SCNC: 105 MMOL/L (ref 98–107)
CHLORIDE SERPL-SCNC: 106 MMOL/L (ref 98–107)
CHLORIDE SERPL-SCNC: 106 MMOL/L (ref 98–107)
CHLORIDE SERPL-SCNC: 107 MMOL/L (ref 98–107)
CHLORIDE SERPL-SCNC: 108 MMOL/L (ref 98–107)
CHOLEST SERPL-MCNC: 162 MG/DL (ref 0–200)
CLARITY UR: CLEAR
CO2 SERPL-SCNC: 18.2 MMOL/L (ref 22–29)
CO2 SERPL-SCNC: 18.7 MMOL/L (ref 22–29)
CO2 SERPL-SCNC: 19.6 MMOL/L (ref 22–29)
CO2 SERPL-SCNC: 21.7 MMOL/L (ref 22–29)
CO2 SERPL-SCNC: 23.8 MMOL/L (ref 22–29)
CO2 SERPL-SCNC: 24.3 MMOL/L (ref 22–29)
CO2 SERPL-SCNC: 25.3 MMOL/L (ref 22–29)
CO2 SERPL-SCNC: 25.6 MMOL/L (ref 22–29)
CO2 SERPL-SCNC: 28.1 MMOL/L (ref 22–29)
CO2 SERPL-SCNC: 28.1 MMOL/L (ref 22–29)
CO2 SERPL-SCNC: 28.4 MMOL/L (ref 22–29)
CO2 SERPL-SCNC: 29 MMOL/L (ref 22–29)
CO2 SERPL-SCNC: 29.2 MMOL/L (ref 22–29)
CO2 SERPL-SCNC: 30.4 MMOL/L (ref 22–29)
CO2 SERPL-SCNC: 31.5 MMOL/L (ref 22–29)
COHGB MFR BLD: 0.3 % (ref 0–1.5)
COHGB MFR BLD: 0.3 % (ref 0–1.5)
COLOR UR: ABNORMAL
CREAT SERPL-MCNC: 0.93 MG/DL (ref 0.76–1.27)
CREAT SERPL-MCNC: 1 MG/DL (ref 0.76–1.27)
CREAT SERPL-MCNC: 1 MG/DL (ref 0.76–1.27)
CREAT SERPL-MCNC: 1.01 MG/DL (ref 0.76–1.27)
CREAT SERPL-MCNC: 1.01 MG/DL (ref 0.76–1.27)
CREAT SERPL-MCNC: 1.05 MG/DL (ref 0.76–1.27)
CREAT SERPL-MCNC: 1.07 MG/DL (ref 0.76–1.27)
CREAT SERPL-MCNC: 1.09 MG/DL (ref 0.76–1.27)
CREAT SERPL-MCNC: 1.13 MG/DL (ref 0.76–1.27)
CREAT SERPL-MCNC: 1.15 MG/DL (ref 0.76–1.27)
CREAT SERPL-MCNC: 1.15 MG/DL (ref 0.76–1.27)
CREAT SERPL-MCNC: 1.2 MG/DL (ref 0.76–1.27)
CREAT SERPL-MCNC: 1.31 MG/DL (ref 0.76–1.27)
CRP SERPL-MCNC: 10.58 MG/DL (ref 0–0.5)
CRP SERPL-MCNC: 11.93 MG/DL (ref 0–0.5)
D DIMER PPP FEU-MCNC: 1.39 MG/L (FEU) (ref 0–0.59)
D DIMER PPP FEU-MCNC: 1.47 MG/L (FEU) (ref 0–0.59)
D-LACTATE SERPL-SCNC: 1.3 MMOL/L (ref 0.5–2)
DEPRECATED RDW RBC AUTO: 42.7 FL (ref 37–54)
DEPRECATED RDW RBC AUTO: 43.5 FL (ref 37–54)
DEPRECATED RDW RBC AUTO: 44 FL (ref 37–54)
DEPRECATED RDW RBC AUTO: 44.1 FL (ref 37–54)
DEPRECATED RDW RBC AUTO: 44.4 FL (ref 37–54)
DEPRECATED RDW RBC AUTO: 44.5 FL (ref 37–54)
DEPRECATED RDW RBC AUTO: 44.5 FL (ref 37–54)
DEPRECATED RDW RBC AUTO: 44.8 FL (ref 37–54)
DEPRECATED RDW RBC AUTO: 45.1 FL (ref 37–54)
DEPRECATED RDW RBC AUTO: 45.4 FL (ref 37–54)
DEPRECATED RDW RBC AUTO: 45.5 FL (ref 37–54)
EOSINOPHIL # BLD AUTO: 0 10*3/MM3 (ref 0–0.4)
EOSINOPHIL # BLD AUTO: 0.01 10*3/MM3 (ref 0–0.4)
EOSINOPHIL # BLD AUTO: 0.03 10*3/MM3 (ref 0–0.4)
EOSINOPHIL # BLD AUTO: 0.23 10*3/MM3 (ref 0–0.4)
EOSINOPHIL NFR BLD AUTO: 0 % (ref 0.3–6.2)
EOSINOPHIL NFR BLD AUTO: 0.2 % (ref 0.3–6.2)
EOSINOPHIL NFR BLD AUTO: 0.3 % (ref 0.3–6.2)
EOSINOPHIL NFR BLD AUTO: 3.4 % (ref 0.3–6.2)
ERYTHROCYTE [DISTWIDTH] IN BLOOD BY AUTOMATED COUNT: 13.4 % (ref 12.3–15.4)
ERYTHROCYTE [DISTWIDTH] IN BLOOD BY AUTOMATED COUNT: 14.1 % (ref 12.3–15.4)
ERYTHROCYTE [DISTWIDTH] IN BLOOD BY AUTOMATED COUNT: 14.1 % (ref 12.3–15.4)
ERYTHROCYTE [DISTWIDTH] IN BLOOD BY AUTOMATED COUNT: 14.2 % (ref 12.3–15.4)
ERYTHROCYTE [DISTWIDTH] IN BLOOD BY AUTOMATED COUNT: 14.3 % (ref 12.3–15.4)
ERYTHROCYTE [DISTWIDTH] IN BLOOD BY AUTOMATED COUNT: 14.3 % (ref 12.3–15.4)
ERYTHROCYTE [DISTWIDTH] IN BLOOD BY AUTOMATED COUNT: 14.4 % (ref 12.3–15.4)
ERYTHROCYTE [DISTWIDTH] IN BLOOD BY AUTOMATED COUNT: 14.5 % (ref 12.3–15.4)
ERYTHROCYTE [DISTWIDTH] IN BLOOD BY AUTOMATED COUNT: 14.5 % (ref 12.3–15.4)
ERYTHROCYTE [DISTWIDTH] IN BLOOD BY AUTOMATED COUNT: 14.6 % (ref 12.3–15.4)
ERYTHROCYTE [DISTWIDTH] IN BLOOD BY AUTOMATED COUNT: 14.6 % (ref 12.3–15.4)
ERYTHROCYTE [SEDIMENTATION RATE] IN BLOOD: 60 MM/HR (ref 0–20)
ERYTHROCYTE [SEDIMENTATION RATE] IN BLOOD: 70 MM/HR (ref 0–20)
FERRITIN SERPL-MCNC: 405.9 NG/ML (ref 30–400)
FERRITIN SERPL-MCNC: 501.2 NG/ML (ref 30–400)
FHHB: 7.9 % (ref 0–5)
FHHB: 8.9 % (ref 0–5)
GAS FLOW AIRWAY: 60 LPM
GFR SERPL CREATININE-BSD FRML MDRD: 53 ML/MIN/1.73
GFR SERPL CREATININE-BSD FRML MDRD: 59 ML/MIN/1.73
GFR SERPL CREATININE-BSD FRML MDRD: 62 ML/MIN/1.73
GFR SERPL CREATININE-BSD FRML MDRD: 62 ML/MIN/1.73
GFR SERPL CREATININE-BSD FRML MDRD: 63 ML/MIN/1.73
GFR SERPL CREATININE-BSD FRML MDRD: 66 ML/MIN/1.73
GFR SERPL CREATININE-BSD FRML MDRD: 68 ML/MIN/1.73
GFR SERPL CREATININE-BSD FRML MDRD: 69 ML/MIN/1.73
GFR SERPL CREATININE-BSD FRML MDRD: 72 ML/MIN/1.73
GFR SERPL CREATININE-BSD FRML MDRD: 72 ML/MIN/1.73
GFR SERPL CREATININE-BSD FRML MDRD: 73 ML/MIN/1.73
GFR SERPL CREATININE-BSD FRML MDRD: 73 ML/MIN/1.73
GFR SERPL CREATININE-BSD FRML MDRD: 79 ML/MIN/1.73
GLOBULIN UR ELPH-MCNC: 2.5 GM/DL
GLOBULIN UR ELPH-MCNC: 2.7 GM/DL
GLOBULIN UR ELPH-MCNC: 2.8 GM/DL
GLOBULIN UR ELPH-MCNC: 2.9 GM/DL
GLOBULIN UR ELPH-MCNC: 3 GM/DL
GLOBULIN UR ELPH-MCNC: 3 GM/DL
GLOBULIN UR ELPH-MCNC: 3.1 GM/DL
GLUCOSE BLDC GLUCOMTR-MCNC: 101 MG/DL (ref 70–99)
GLUCOSE BLDC GLUCOMTR-MCNC: 101 MG/DL (ref 70–99)
GLUCOSE BLDC GLUCOMTR-MCNC: 102 MG/DL (ref 70–99)
GLUCOSE BLDC GLUCOMTR-MCNC: 104 MG/DL (ref 70–99)
GLUCOSE BLDC GLUCOMTR-MCNC: 107 MG/DL (ref 70–99)
GLUCOSE BLDC GLUCOMTR-MCNC: 108 MG/DL (ref 70–99)
GLUCOSE BLDC GLUCOMTR-MCNC: 108 MG/DL (ref 70–99)
GLUCOSE BLDC GLUCOMTR-MCNC: 112 MG/DL (ref 70–99)
GLUCOSE BLDC GLUCOMTR-MCNC: 113 MG/DL (ref 70–99)
GLUCOSE BLDC GLUCOMTR-MCNC: 115 MG/DL (ref 70–99)
GLUCOSE BLDC GLUCOMTR-MCNC: 116 MG/DL (ref 70–99)
GLUCOSE BLDC GLUCOMTR-MCNC: 116 MG/DL (ref 70–99)
GLUCOSE BLDC GLUCOMTR-MCNC: 118 MG/DL (ref 70–99)
GLUCOSE BLDC GLUCOMTR-MCNC: 119 MG/DL (ref 70–99)
GLUCOSE BLDC GLUCOMTR-MCNC: 120 MG/DL (ref 70–99)
GLUCOSE BLDC GLUCOMTR-MCNC: 122 MG/DL (ref 70–99)
GLUCOSE BLDC GLUCOMTR-MCNC: 123 MG/DL (ref 70–99)
GLUCOSE BLDC GLUCOMTR-MCNC: 124 MG/DL (ref 70–99)
GLUCOSE BLDC GLUCOMTR-MCNC: 125 MG/DL (ref 70–99)
GLUCOSE BLDC GLUCOMTR-MCNC: 125 MG/DL (ref 70–99)
GLUCOSE BLDC GLUCOMTR-MCNC: 126 MG/DL (ref 70–99)
GLUCOSE BLDC GLUCOMTR-MCNC: 127 MG/DL (ref 70–99)
GLUCOSE BLDC GLUCOMTR-MCNC: 128 MG/DL (ref 70–99)
GLUCOSE BLDC GLUCOMTR-MCNC: 128 MG/DL (ref 70–99)
GLUCOSE BLDC GLUCOMTR-MCNC: 130 MG/DL (ref 70–99)
GLUCOSE BLDC GLUCOMTR-MCNC: 130 MG/DL (ref 70–99)
GLUCOSE BLDC GLUCOMTR-MCNC: 131 MG/DL (ref 70–99)
GLUCOSE BLDC GLUCOMTR-MCNC: 131 MG/DL (ref 70–99)
GLUCOSE BLDC GLUCOMTR-MCNC: 132 MG/DL (ref 70–99)
GLUCOSE BLDC GLUCOMTR-MCNC: 132 MG/DL (ref 70–99)
GLUCOSE BLDC GLUCOMTR-MCNC: 134 MG/DL (ref 70–99)
GLUCOSE BLDC GLUCOMTR-MCNC: 135 MG/DL (ref 70–99)
GLUCOSE BLDC GLUCOMTR-MCNC: 136 MG/DL (ref 70–99)
GLUCOSE BLDC GLUCOMTR-MCNC: 137 MG/DL (ref 70–99)
GLUCOSE BLDC GLUCOMTR-MCNC: 137 MG/DL (ref 70–99)
GLUCOSE BLDC GLUCOMTR-MCNC: 138 MG/DL (ref 70–99)
GLUCOSE BLDC GLUCOMTR-MCNC: 139 MG/DL (ref 70–99)
GLUCOSE BLDC GLUCOMTR-MCNC: 140 MG/DL (ref 70–99)
GLUCOSE BLDC GLUCOMTR-MCNC: 141 MG/DL (ref 70–99)
GLUCOSE BLDC GLUCOMTR-MCNC: 142 MG/DL (ref 70–130)
GLUCOSE BLDC GLUCOMTR-MCNC: 143 MG/DL (ref 70–99)
GLUCOSE BLDC GLUCOMTR-MCNC: 144 MG/DL (ref 70–99)
GLUCOSE BLDC GLUCOMTR-MCNC: 146 MG/DL (ref 70–99)
GLUCOSE BLDC GLUCOMTR-MCNC: 146 MG/DL (ref 70–99)
GLUCOSE BLDC GLUCOMTR-MCNC: 148 MG/DL (ref 70–99)
GLUCOSE BLDC GLUCOMTR-MCNC: 149 MG/DL (ref 70–99)
GLUCOSE BLDC GLUCOMTR-MCNC: 150 MG/DL (ref 70–99)
GLUCOSE BLDC GLUCOMTR-MCNC: 150 MG/DL (ref 70–99)
GLUCOSE BLDC GLUCOMTR-MCNC: 152 MG/DL (ref 70–99)
GLUCOSE BLDC GLUCOMTR-MCNC: 153 MG/DL (ref 70–99)
GLUCOSE BLDC GLUCOMTR-MCNC: 154 MG/DL (ref 70–99)
GLUCOSE BLDC GLUCOMTR-MCNC: 154 MG/DL (ref 70–99)
GLUCOSE BLDC GLUCOMTR-MCNC: 155 MG/DL (ref 70–99)
GLUCOSE BLDC GLUCOMTR-MCNC: 156 MG/DL (ref 70–99)
GLUCOSE BLDC GLUCOMTR-MCNC: 156 MG/DL (ref 70–99)
GLUCOSE BLDC GLUCOMTR-MCNC: 158 MG/DL (ref 70–99)
GLUCOSE BLDC GLUCOMTR-MCNC: 159 MG/DL (ref 70–99)
GLUCOSE BLDC GLUCOMTR-MCNC: 160 MG/DL (ref 70–99)
GLUCOSE BLDC GLUCOMTR-MCNC: 161 MG/DL (ref 70–99)
GLUCOSE BLDC GLUCOMTR-MCNC: 162 MG/DL (ref 70–99)
GLUCOSE BLDC GLUCOMTR-MCNC: 162 MG/DL (ref 70–99)
GLUCOSE BLDC GLUCOMTR-MCNC: 164 MG/DL (ref 70–99)
GLUCOSE BLDC GLUCOMTR-MCNC: 165 MG/DL (ref 70–99)
GLUCOSE BLDC GLUCOMTR-MCNC: 167 MG/DL (ref 70–99)
GLUCOSE BLDC GLUCOMTR-MCNC: 169 MG/DL (ref 70–99)
GLUCOSE BLDC GLUCOMTR-MCNC: 171 MG/DL (ref 70–99)
GLUCOSE BLDC GLUCOMTR-MCNC: 173 MG/DL (ref 70–99)
GLUCOSE BLDC GLUCOMTR-MCNC: 174 MG/DL (ref 70–99)
GLUCOSE BLDC GLUCOMTR-MCNC: 174 MG/DL (ref 70–99)
GLUCOSE BLDC GLUCOMTR-MCNC: 176 MG/DL (ref 70–99)
GLUCOSE BLDC GLUCOMTR-MCNC: 177 MG/DL (ref 70–99)
GLUCOSE BLDC GLUCOMTR-MCNC: 179 MG/DL (ref 70–99)
GLUCOSE BLDC GLUCOMTR-MCNC: 180 MG/DL (ref 70–99)
GLUCOSE BLDC GLUCOMTR-MCNC: 181 MG/DL (ref 70–99)
GLUCOSE BLDC GLUCOMTR-MCNC: 182 MG/DL (ref 70–99)
GLUCOSE BLDC GLUCOMTR-MCNC: 185 MG/DL (ref 70–99)
GLUCOSE BLDC GLUCOMTR-MCNC: 186 MG/DL (ref 70–99)
GLUCOSE BLDC GLUCOMTR-MCNC: 187 MG/DL (ref 70–99)
GLUCOSE BLDC GLUCOMTR-MCNC: 190 MG/DL (ref 70–99)
GLUCOSE BLDC GLUCOMTR-MCNC: 191 MG/DL (ref 70–99)
GLUCOSE BLDC GLUCOMTR-MCNC: 197 MG/DL (ref 70–99)
GLUCOSE BLDC GLUCOMTR-MCNC: 197 MG/DL (ref 70–99)
GLUCOSE BLDC GLUCOMTR-MCNC: 199 MG/DL (ref 70–99)
GLUCOSE BLDC GLUCOMTR-MCNC: 202 MG/DL (ref 70–99)
GLUCOSE BLDC GLUCOMTR-MCNC: 202 MG/DL (ref 70–99)
GLUCOSE BLDC GLUCOMTR-MCNC: 204 MG/DL (ref 70–130)
GLUCOSE BLDC GLUCOMTR-MCNC: 205 MG/DL (ref 70–99)
GLUCOSE BLDC GLUCOMTR-MCNC: 207 MG/DL (ref 70–99)
GLUCOSE BLDC GLUCOMTR-MCNC: 208 MG/DL (ref 70–99)
GLUCOSE BLDC GLUCOMTR-MCNC: 210 MG/DL (ref 70–99)
GLUCOSE BLDC GLUCOMTR-MCNC: 210 MG/DL (ref 70–99)
GLUCOSE BLDC GLUCOMTR-MCNC: 211 MG/DL (ref 70–99)
GLUCOSE BLDC GLUCOMTR-MCNC: 212 MG/DL (ref 70–99)
GLUCOSE BLDC GLUCOMTR-MCNC: 212 MG/DL (ref 70–99)
GLUCOSE BLDC GLUCOMTR-MCNC: 213 MG/DL (ref 70–99)
GLUCOSE BLDC GLUCOMTR-MCNC: 214 MG/DL (ref 70–99)
GLUCOSE BLDC GLUCOMTR-MCNC: 217 MG/DL (ref 70–99)
GLUCOSE BLDC GLUCOMTR-MCNC: 218 MG/DL (ref 70–99)
GLUCOSE BLDC GLUCOMTR-MCNC: 223 MG/DL (ref 70–99)
GLUCOSE BLDC GLUCOMTR-MCNC: 226 MG/DL (ref 70–99)
GLUCOSE BLDC GLUCOMTR-MCNC: 230 MG/DL (ref 70–99)
GLUCOSE BLDC GLUCOMTR-MCNC: 234 MG/DL (ref 70–99)
GLUCOSE BLDC GLUCOMTR-MCNC: 242 MG/DL (ref 70–99)
GLUCOSE BLDC GLUCOMTR-MCNC: 247 MG/DL (ref 70–99)
GLUCOSE BLDC GLUCOMTR-MCNC: 251 MG/DL (ref 70–99)
GLUCOSE BLDC GLUCOMTR-MCNC: 251 MG/DL (ref 70–99)
GLUCOSE BLDC GLUCOMTR-MCNC: 252 MG/DL (ref 70–99)
GLUCOSE BLDC GLUCOMTR-MCNC: 259 MG/DL (ref 70–99)
GLUCOSE BLDC GLUCOMTR-MCNC: 263 MG/DL (ref 70–99)
GLUCOSE BLDC GLUCOMTR-MCNC: 267 MG/DL (ref 70–99)
GLUCOSE BLDC GLUCOMTR-MCNC: 271 MG/DL (ref 70–99)
GLUCOSE BLDC GLUCOMTR-MCNC: 276 MG/DL (ref 70–99)
GLUCOSE BLDC GLUCOMTR-MCNC: 277 MG/DL (ref 70–99)
GLUCOSE BLDC GLUCOMTR-MCNC: 281 MG/DL (ref 70–99)
GLUCOSE BLDC GLUCOMTR-MCNC: 282 MG/DL (ref 70–99)
GLUCOSE BLDC GLUCOMTR-MCNC: 298 MG/DL (ref 70–99)
GLUCOSE BLDC GLUCOMTR-MCNC: 300 MG/DL (ref 70–99)
GLUCOSE BLDC GLUCOMTR-MCNC: 315 MG/DL (ref 70–99)
GLUCOSE BLDC GLUCOMTR-MCNC: 323 MG/DL (ref 70–99)
GLUCOSE BLDC GLUCOMTR-MCNC: 325 MG/DL (ref 70–99)
GLUCOSE BLDC GLUCOMTR-MCNC: 349 MG/DL (ref 70–99)
GLUCOSE BLDC GLUCOMTR-MCNC: 366 MG/DL (ref 70–130)
GLUCOSE BLDC GLUCOMTR-MCNC: 370 MG/DL (ref 70–99)
GLUCOSE BLDC GLUCOMTR-MCNC: 387 MG/DL (ref 70–99)
GLUCOSE BLDC GLUCOMTR-MCNC: 41 MG/DL (ref 70–130)
GLUCOSE BLDC GLUCOMTR-MCNC: 425 MG/DL (ref 70–99)
GLUCOSE BLDC GLUCOMTR-MCNC: 427 MG/DL (ref 70–99)
GLUCOSE BLDC GLUCOMTR-MCNC: 436 MG/DL (ref 70–99)
GLUCOSE BLDC GLUCOMTR-MCNC: 48 MG/DL (ref 70–99)
GLUCOSE BLDC GLUCOMTR-MCNC: 50 MG/DL (ref 70–130)
GLUCOSE BLDC GLUCOMTR-MCNC: 54 MG/DL (ref 70–99)
GLUCOSE BLDC GLUCOMTR-MCNC: 57 MG/DL (ref 70–99)
GLUCOSE BLDC GLUCOMTR-MCNC: 63 MG/DL (ref 70–99)
GLUCOSE BLDC GLUCOMTR-MCNC: 67 MG/DL (ref 70–130)
GLUCOSE BLDC GLUCOMTR-MCNC: 68 MG/DL (ref 70–99)
GLUCOSE BLDC GLUCOMTR-MCNC: 70 MG/DL (ref 70–130)
GLUCOSE BLDC GLUCOMTR-MCNC: 75 MG/DL (ref 70–99)
GLUCOSE BLDC GLUCOMTR-MCNC: 78 MG/DL (ref 70–99)
GLUCOSE BLDC GLUCOMTR-MCNC: 79 MG/DL (ref 70–99)
GLUCOSE BLDC GLUCOMTR-MCNC: 80 MG/DL (ref 70–130)
GLUCOSE BLDC GLUCOMTR-MCNC: 80 MG/DL (ref 70–99)
GLUCOSE BLDC GLUCOMTR-MCNC: 80 MG/DL (ref 70–99)
GLUCOSE BLDC GLUCOMTR-MCNC: 82 MG/DL (ref 70–99)
GLUCOSE BLDC GLUCOMTR-MCNC: 90 MG/DL (ref 70–99)
GLUCOSE BLDC GLUCOMTR-MCNC: 92 MG/DL (ref 70–99)
GLUCOSE BLDC GLUCOMTR-MCNC: 93 MG/DL (ref 70–99)
GLUCOSE BLDC GLUCOMTR-MCNC: 93 MG/DL (ref 70–99)
GLUCOSE BLDC GLUCOMTR-MCNC: 95 MG/DL (ref 70–99)
GLUCOSE BLDC GLUCOMTR-MCNC: 96 MG/DL (ref 70–99)
GLUCOSE BLDC GLUCOMTR-MCNC: 98 MG/DL (ref 70–99)
GLUCOSE BLDC GLUCOMTR-MCNC: 98 MG/DL (ref 70–99)
GLUCOSE BLDC GLUCOMTR-MCNC: 99 MG/DL (ref 70–99)
GLUCOSE BLDC GLUCOMTR-MCNC: 99 MG/DL (ref 70–99)
GLUCOSE BLDC GLUCOMTR-MCNC: >500 MG/DL (ref 70–99)
GLUCOSE BLDC GLUCOMTR-MCNC: >500 MG/DL (ref 70–99)
GLUCOSE SERPL-MCNC: 111 MG/DL (ref 65–99)
GLUCOSE SERPL-MCNC: 117 MG/DL (ref 65–99)
GLUCOSE SERPL-MCNC: 129 MG/DL (ref 65–99)
GLUCOSE SERPL-MCNC: 135 MG/DL (ref 65–99)
GLUCOSE SERPL-MCNC: 143 MG/DL (ref 65–99)
GLUCOSE SERPL-MCNC: 148 MG/DL (ref 65–99)
GLUCOSE SERPL-MCNC: 156 MG/DL (ref 65–99)
GLUCOSE SERPL-MCNC: 169 MG/DL (ref 65–99)
GLUCOSE SERPL-MCNC: 171 MG/DL (ref 65–99)
GLUCOSE SERPL-MCNC: 179 MG/DL (ref 65–99)
GLUCOSE SERPL-MCNC: 258 MG/DL (ref 65–99)
GLUCOSE SERPL-MCNC: 35 MG/DL (ref 65–99)
GLUCOSE SERPL-MCNC: 484 MG/DL (ref 65–99)
GLUCOSE SERPL-MCNC: 554 MG/DL (ref 65–99)
GLUCOSE SERPL-MCNC: 77 MG/DL (ref 65–99)
GLUCOSE UR STRIP-MCNC: ABNORMAL MG/DL
GRAM STN SPEC: NORMAL
GRAM STN SPEC: NORMAL
HBA1C MFR BLD: 8.66 % (ref 4.8–5.6)
HBA1C MFR BLD: 8.87 % (ref 4.8–5.6)
HCO3 BLDA-SCNC: 17 MMOL/L (ref 22–26)
HCO3 BLDA-SCNC: 17.8 MMOL/L (ref 22–26)
HCT VFR BLD AUTO: 35.4 % (ref 37.5–51)
HCT VFR BLD AUTO: 35.8 % (ref 37.5–51)
HCT VFR BLD AUTO: 36.7 % (ref 37.5–51)
HCT VFR BLD AUTO: 37.5 % (ref 37.5–51)
HCT VFR BLD AUTO: 39 % (ref 37.5–51)
HCT VFR BLD AUTO: 39 % (ref 37.5–51)
HCT VFR BLD AUTO: 39.4 % (ref 37.5–51)
HCT VFR BLD AUTO: 39.5 % (ref 37.5–51)
HCT VFR BLD AUTO: 41.6 % (ref 37.5–51)
HCT VFR BLD AUTO: 42 % (ref 37.5–51)
HCT VFR BLD AUTO: 42.1 % (ref 37.5–51)
HCT VFR BLD AUTO: 43 % (ref 37.5–51)
HCT VFR BLD AUTO: 46.3 % (ref 37.5–51)
HDLC SERPL-MCNC: 61 MG/DL (ref 40–60)
HGB BLD-MCNC: 11.5 G/DL (ref 13–17.7)
HGB BLD-MCNC: 11.7 G/DL (ref 13–17.7)
HGB BLD-MCNC: 12.1 G/DL (ref 13–17.7)
HGB BLD-MCNC: 12.1 G/DL (ref 13–17.7)
HGB BLD-MCNC: 12.6 G/DL (ref 13–17.7)
HGB BLD-MCNC: 12.9 G/DL (ref 13–17.7)
HGB BLD-MCNC: 13 G/DL (ref 13–17.7)
HGB BLD-MCNC: 13.1 G/DL (ref 13–17.7)
HGB BLD-MCNC: 13.7 G/DL (ref 13–17.7)
HGB BLD-MCNC: 14.3 G/DL (ref 13–17.7)
HGB BLD-MCNC: 15.2 G/DL (ref 13–17.7)
HGB BLDA-MCNC: 12.7 G/DL (ref 13.8–16.4)
HGB BLDA-MCNC: 12.9 G/DL (ref 13.8–16.4)
HGB UR QL STRIP.AUTO: NEGATIVE
HOLD SPECIMEN: NORMAL
HOLD SPECIMEN: NORMAL
HYALINE CASTS UR QL AUTO: ABNORMAL /LPF
HYPOCHROMIA BLD QL: NORMAL
IMM GRANULOCYTES # BLD AUTO: 0.02 10*3/MM3 (ref 0–0.05)
IMM GRANULOCYTES # BLD AUTO: 0.03 10*3/MM3 (ref 0–0.05)
IMM GRANULOCYTES # BLD AUTO: 0.03 10*3/MM3 (ref 0–0.05)
IMM GRANULOCYTES # BLD AUTO: 0.05 10*3/MM3 (ref 0–0.05)
IMM GRANULOCYTES # BLD AUTO: 0.09 10*3/MM3 (ref 0–0.05)
IMM GRANULOCYTES # BLD AUTO: 0.19 10*3/MM3 (ref 0–0.05)
IMM GRANULOCYTES # BLD AUTO: 0.32 10*3/MM3 (ref 0–0.05)
IMM GRANULOCYTES NFR BLD AUTO: 0.3 % (ref 0–0.5)
IMM GRANULOCYTES NFR BLD AUTO: 0.3 % (ref 0–0.5)
IMM GRANULOCYTES NFR BLD AUTO: 0.4 % (ref 0–0.5)
IMM GRANULOCYTES NFR BLD AUTO: 0.5 % (ref 0–0.5)
IMM GRANULOCYTES NFR BLD AUTO: 0.7 % (ref 0–0.5)
IMM GRANULOCYTES NFR BLD AUTO: 0.9 % (ref 0–0.5)
IMM GRANULOCYTES NFR BLD AUTO: 1.1 % (ref 0–0.5)
IMM GRANULOCYTES NFR BLD AUTO: 2.3 % (ref 0–0.5)
IMM GRANULOCYTES NFR BLD AUTO: 3.3 % (ref 0–0.5)
INHALED O2 CONCENTRATION: 100 %
INHALED O2 CONCENTRATION: 21 %
INR PPP: 0.92 (ref 2–3)
KETONES UR QL STRIP: NEGATIVE
L PNEUMO1 AG UR QL IA: NEGATIVE
LACTATE BLDA-SCNC: ABNORMAL MMOL/L
LARGE PLATELETS: NORMAL
LDLC SERPL CALC-MCNC: 81 MG/DL (ref 0–100)
LDLC/HDLC SERPL: 1.3 {RATIO}
LEUKOCYTE ESTERASE UR QL STRIP.AUTO: NEGATIVE
LYMPHOCYTES # BLD AUTO: 0.36 10*3/MM3 (ref 0.7–3.1)
LYMPHOCYTES # BLD AUTO: 0.46 10*3/MM3 (ref 0.7–3.1)
LYMPHOCYTES # BLD AUTO: 0.5 10*3/MM3 (ref 0.7–3.1)
LYMPHOCYTES # BLD AUTO: 0.5 10*3/MM3 (ref 0.7–3.1)
LYMPHOCYTES # BLD AUTO: 0.73 10*3/MM3 (ref 0.7–3.1)
LYMPHOCYTES # BLD AUTO: 0.99 10*3/MM3 (ref 0.7–3.1)
LYMPHOCYTES # BLD AUTO: 1.28 10*3/MM3 (ref 0.7–3.1)
LYMPHOCYTES # BLD AUTO: 1.92 10*3/MM3 (ref 0.7–3.1)
LYMPHOCYTES # BLD AUTO: 1.93 10*3/MM3 (ref 0.7–3.1)
LYMPHOCYTES NFR BLD AUTO: 10.8 % (ref 19.6–45.3)
LYMPHOCYTES NFR BLD AUTO: 14.6 % (ref 19.6–45.3)
LYMPHOCYTES NFR BLD AUTO: 18 % (ref 19.6–45.3)
LYMPHOCYTES NFR BLD AUTO: 25.4 % (ref 19.6–45.3)
LYMPHOCYTES NFR BLD AUTO: 28.4 % (ref 19.6–45.3)
LYMPHOCYTES NFR BLD AUTO: 32.2 % (ref 19.6–45.3)
LYMPHOCYTES NFR BLD AUTO: 4.4 % (ref 19.6–45.3)
LYMPHOCYTES NFR BLD AUTO: 4.8 % (ref 19.6–45.3)
LYMPHOCYTES NFR BLD AUTO: 6.3 % (ref 19.6–45.3)
MAGNESIUM SERPL-MCNC: 1.8 MG/DL (ref 1.6–2.4)
MAGNESIUM SERPL-MCNC: 1.9 MG/DL (ref 1.6–2.4)
MAGNESIUM SERPL-MCNC: 1.9 MG/DL (ref 1.6–2.4)
MAGNESIUM SERPL-MCNC: 2.1 MG/DL (ref 1.6–2.4)
MAGNESIUM SERPL-MCNC: 2.1 MG/DL (ref 1.6–2.4)
MAGNESIUM SERPL-MCNC: 2.2 MG/DL (ref 1.6–2.4)
MAGNESIUM SERPL-MCNC: 2.2 MG/DL (ref 1.6–2.4)
MAGNESIUM SERPL-MCNC: 2.3 MG/DL (ref 1.6–2.4)
MAGNESIUM SERPL-MCNC: 2.3 MG/DL (ref 1.6–2.4)
MAGNESIUM SERPL-MCNC: 2.4 MG/DL (ref 1.6–2.4)
MAGNESIUM SERPL-MCNC: 2.4 MG/DL (ref 1.6–2.4)
MCH RBC QN AUTO: 27.5 PG (ref 26.6–33)
MCH RBC QN AUTO: 27.6 PG (ref 26.6–33)
MCH RBC QN AUTO: 27.8 PG (ref 26.6–33)
MCH RBC QN AUTO: 27.8 PG (ref 26.6–33)
MCH RBC QN AUTO: 27.9 PG (ref 26.6–33)
MCH RBC QN AUTO: 28.1 PG (ref 26.6–33)
MCH RBC QN AUTO: 28.2 PG (ref 26.6–33)
MCH RBC QN AUTO: 28.2 PG (ref 26.6–33)
MCH RBC QN AUTO: 28.3 PG (ref 26.6–33)
MCH RBC QN AUTO: 28.3 PG (ref 26.6–33)
MCH RBC QN AUTO: 28.4 PG (ref 26.6–33)
MCH RBC QN AUTO: 28.4 PG (ref 26.6–33)
MCH RBC QN AUTO: 28.6 PG (ref 26.6–33)
MCHC RBC AUTO-ENTMCNC: 32.3 G/DL (ref 31.5–35.7)
MCHC RBC AUTO-ENTMCNC: 32.3 G/DL (ref 31.5–35.7)
MCHC RBC AUTO-ENTMCNC: 32.5 G/DL (ref 31.5–35.7)
MCHC RBC AUTO-ENTMCNC: 32.5 G/DL (ref 31.5–35.7)
MCHC RBC AUTO-ENTMCNC: 32.6 G/DL (ref 31.5–35.7)
MCHC RBC AUTO-ENTMCNC: 32.7 G/DL (ref 31.5–35.7)
MCHC RBC AUTO-ENTMCNC: 32.8 G/DL (ref 31.5–35.7)
MCHC RBC AUTO-ENTMCNC: 32.9 G/DL (ref 31.5–35.7)
MCHC RBC AUTO-ENTMCNC: 33 G/DL (ref 31.5–35.7)
MCHC RBC AUTO-ENTMCNC: 33 G/DL (ref 31.5–35.7)
MCHC RBC AUTO-ENTMCNC: 33.1 G/DL (ref 31.5–35.7)
MCHC RBC AUTO-ENTMCNC: 33.2 G/DL (ref 31.5–35.7)
MCHC RBC AUTO-ENTMCNC: 33.3 G/DL (ref 31.5–35.7)
MCV RBC AUTO: 84 FL (ref 79–97)
MCV RBC AUTO: 85 FL (ref 79–97)
MCV RBC AUTO: 85.1 FL (ref 79–97)
MCV RBC AUTO: 85.2 FL (ref 79–97)
MCV RBC AUTO: 85.5 FL (ref 79–97)
MCV RBC AUTO: 85.6 FL (ref 79–97)
MCV RBC AUTO: 85.6 FL (ref 79–97)
MCV RBC AUTO: 85.7 FL (ref 79–97)
MCV RBC AUTO: 85.7 FL (ref 79–97)
MCV RBC AUTO: 85.9 FL (ref 79–97)
MCV RBC AUTO: 86.1 FL (ref 79–97)
MCV RBC AUTO: 86.8 FL (ref 79–97)
MCV RBC AUTO: 87.2 FL (ref 79–97)
METHGB BLD QL: 0.3 % (ref 0–1.5)
METHGB BLD QL: 0.3 % (ref 0–1.5)
MODALITY: ABNORMAL
MODALITY: ABNORMAL
MONOCYTES # BLD AUTO: 0.14 10*3/MM3 (ref 0.1–0.9)
MONOCYTES # BLD AUTO: 0.25 10*3/MM3 (ref 0.1–0.9)
MONOCYTES # BLD AUTO: 0.25 10*3/MM3 (ref 0.1–0.9)
MONOCYTES # BLD AUTO: 0.27 10*3/MM3 (ref 0.1–0.9)
MONOCYTES # BLD AUTO: 0.38 10*3/MM3 (ref 0.1–0.9)
MONOCYTES # BLD AUTO: 0.52 10*3/MM3 (ref 0.1–0.9)
MONOCYTES # BLD AUTO: 0.54 10*3/MM3 (ref 0.1–0.9)
MONOCYTES # BLD AUTO: 0.58 10*3/MM3 (ref 0.1–0.9)
MONOCYTES # BLD AUTO: 0.58 10*3/MM3 (ref 0.1–0.9)
MONOCYTES NFR BLD AUTO: 10.7 % (ref 5–12)
MONOCYTES NFR BLD AUTO: 2.1 % (ref 5–12)
MONOCYTES NFR BLD AUTO: 2.6 % (ref 5–12)
MONOCYTES NFR BLD AUTO: 3.2 % (ref 5–12)
MONOCYTES NFR BLD AUTO: 4.6 % (ref 5–12)
MONOCYTES NFR BLD AUTO: 7.9 % (ref 5–12)
MONOCYTES NFR BLD AUTO: 8.6 % (ref 5–12)
MONOCYTES NFR BLD AUTO: 9.5 % (ref 5–12)
MONOCYTES NFR BLD AUTO: 9.7 % (ref 5–12)
NEUTROPHILS NFR BLD AUTO: 2.62 10*3/MM3 (ref 1.7–7)
NEUTROPHILS NFR BLD AUTO: 3.19 10*3/MM3 (ref 1.7–7)
NEUTROPHILS NFR BLD AUTO: 3.44 10*3/MM3 (ref 1.7–7)
NEUTROPHILS NFR BLD AUTO: 3.92 10*3/MM3 (ref 1.7–7)
NEUTROPHILS NFR BLD AUTO: 3.95 10*3/MM3 (ref 1.7–7)
NEUTROPHILS NFR BLD AUTO: 5.81 10*3/MM3 (ref 1.7–7)
NEUTROPHILS NFR BLD AUTO: 57.5 % (ref 42.7–76)
NEUTROPHILS NFR BLD AUTO: 58.3 % (ref 42.7–76)
NEUTROPHILS NFR BLD AUTO: 63.3 % (ref 42.7–76)
NEUTROPHILS NFR BLD AUTO: 7.04 10*3/MM3 (ref 1.7–7)
NEUTROPHILS NFR BLD AUTO: 7.29 10*3/MM3 (ref 1.7–7)
NEUTROPHILS NFR BLD AUTO: 71.4 % (ref 42.7–76)
NEUTROPHILS NFR BLD AUTO: 76.6 % (ref 42.7–76)
NEUTROPHILS NFR BLD AUTO: 8.58 10*3/MM3 (ref 1.7–7)
NEUTROPHILS NFR BLD AUTO: 86.3 % (ref 42.7–76)
NEUTROPHILS NFR BLD AUTO: 88.7 % (ref 42.7–76)
NEUTROPHILS NFR BLD AUTO: 88.7 % (ref 42.7–76)
NEUTROPHILS NFR BLD AUTO: 89.4 % (ref 42.7–76)
NITRITE UR QL STRIP: NEGATIVE
NRBC BLD AUTO-RTO: 0 /100 WBC (ref 0–0.2)
NT-PROBNP SERPL-MCNC: 898.5 PG/ML (ref 0–900)
OXYHGB MFR BLDV: 90.5 % (ref 94–99)
OXYHGB MFR BLDV: 91.5 % (ref 94–99)
PCO2 BLDA: 30 MM HG (ref 35–45)
PCO2 BLDA: 32 MM HG (ref 35–45)
PH BLDA: 7.34 PH UNITS (ref 7.35–7.45)
PH BLDA: 7.39 PH UNITS (ref 7.35–7.45)
PH UR STRIP.AUTO: 5.5 [PH] (ref 5–8)
PHOSPHATE SERPL-MCNC: 1.8 MG/DL (ref 2.5–4.5)
PHOSPHATE SERPL-MCNC: 2.5 MG/DL (ref 2.5–4.5)
PHOSPHATE SERPL-MCNC: 2.6 MG/DL (ref 2.5–4.5)
PHOSPHATE SERPL-MCNC: 2.9 MG/DL (ref 2.5–4.5)
PHOSPHATE SERPL-MCNC: 3.9 MG/DL (ref 2.5–4.5)
PHOSPHATE SERPL-MCNC: 4.8 MG/DL (ref 2.5–4.5)
PLATELET # BLD AUTO: 121 10*3/MM3 (ref 140–450)
PLATELET # BLD AUTO: 127 10*3/MM3 (ref 140–450)
PLATELET # BLD AUTO: 134 10*3/MM3 (ref 140–450)
PLATELET # BLD AUTO: 149 10*3/MM3 (ref 140–450)
PLATELET # BLD AUTO: 153 10*3/MM3 (ref 140–450)
PLATELET # BLD AUTO: 166 10*3/MM3 (ref 140–450)
PLATELET # BLD AUTO: 168 10*3/MM3 (ref 140–450)
PLATELET # BLD AUTO: 170 10*3/MM3 (ref 140–450)
PLATELET # BLD AUTO: 175 10*3/MM3 (ref 140–450)
PLATELET # BLD AUTO: 181 10*3/MM3 (ref 140–450)
PLATELET # BLD AUTO: 187 10*3/MM3 (ref 140–450)
PLATELET # BLD AUTO: 196 10*3/MM3 (ref 140–450)
PLATELET # BLD AUTO: 212 10*3/MM3 (ref 140–450)
PMV BLD AUTO: 10 FL (ref 6–12)
PMV BLD AUTO: 10.3 FL (ref 6–12)
PMV BLD AUTO: 10.4 FL (ref 6–12)
PMV BLD AUTO: 10.5 FL (ref 6–12)
PMV BLD AUTO: 10.6 FL (ref 6–12)
PMV BLD AUTO: 10.7 FL (ref 6–12)
PMV BLD AUTO: 10.8 FL (ref 6–12)
PMV BLD AUTO: 11 FL (ref 6–12)
PO2 BLD: 307 MM[HG] (ref 0–500)
PO2 BLD: 56 MM[HG] (ref 0–500)
PO2 BLDA: 56.4 MM HG (ref 80–100)
PO2 BLDA: 64.4 MM HG (ref 80–100)
POLYCHROMASIA BLD QL SMEAR: NORMAL
POTASSIUM SERPL-SCNC: 3.3 MMOL/L (ref 3.5–5.2)
POTASSIUM SERPL-SCNC: 3.4 MMOL/L (ref 3.5–5.2)
POTASSIUM SERPL-SCNC: 3.6 MMOL/L (ref 3.5–5.2)
POTASSIUM SERPL-SCNC: 3.7 MMOL/L (ref 3.5–5.2)
POTASSIUM SERPL-SCNC: 4 MMOL/L (ref 3.5–5.2)
POTASSIUM SERPL-SCNC: 4.3 MMOL/L (ref 3.5–5.2)
POTASSIUM SERPL-SCNC: 4.4 MMOL/L (ref 3.5–5.2)
POTASSIUM SERPL-SCNC: 4.4 MMOL/L (ref 3.5–5.2)
POTASSIUM SERPL-SCNC: 4.7 MMOL/L (ref 3.5–5.2)
POTASSIUM SERPL-SCNC: 4.7 MMOL/L (ref 3.5–5.2)
POTASSIUM SERPL-SCNC: 4.8 MMOL/L (ref 3.5–5.2)
POTASSIUM SERPL-SCNC: 5.1 MMOL/L (ref 3.5–5.2)
PROCALCITONIN SERPL-MCNC: 0.11 NG/ML (ref 0–0.25)
PROT SERPL-MCNC: 5.7 G/DL (ref 6–8.5)
PROT SERPL-MCNC: 5.7 G/DL (ref 6–8.5)
PROT SERPL-MCNC: 5.9 G/DL (ref 6–8.5)
PROT SERPL-MCNC: 6.1 G/DL (ref 6–8.5)
PROT SERPL-MCNC: 6.4 G/DL (ref 6–8.5)
PROT SERPL-MCNC: 6.7 G/DL (ref 6–8.5)
PROT SERPL-MCNC: 6.7 G/DL (ref 6–8.5)
PROT UR QL STRIP: ABNORMAL
PROTHROMBIN TIME: 10.3 SECONDS (ref 9.4–12)
RBC # BLD AUTO: 4.14 10*6/MM3 (ref 4.14–5.8)
RBC # BLD AUTO: 4.16 10*6/MM3 (ref 4.14–5.8)
RBC # BLD AUTO: 4.28 10*6/MM3 (ref 4.14–5.8)
RBC # BLD AUTO: 4.38 10*6/MM3 (ref 4.14–5.8)
RBC # BLD AUTO: 4.54 10*6/MM3 (ref 4.14–5.8)
RBC # BLD AUTO: 4.55 10*6/MM3 (ref 4.14–5.8)
RBC # BLD AUTO: 4.59 10*6/MM3 (ref 4.14–5.8)
RBC # BLD AUTO: 4.7 10*6/MM3 (ref 4.14–5.8)
RBC # BLD AUTO: 4.83 10*6/MM3 (ref 4.14–5.8)
RBC # BLD AUTO: 4.86 10*6/MM3 (ref 4.14–5.8)
RBC # BLD AUTO: 4.93 10*6/MM3 (ref 4.14–5.8)
RBC # BLD AUTO: 5.05 10*6/MM3 (ref 4.14–5.8)
RBC # BLD AUTO: 5.39 10*6/MM3 (ref 4.14–5.8)
RBC # UR: ABNORMAL /HPF
REF LAB TEST METHOD: ABNORMAL
S PNEUM AG SPEC QL LA: NEGATIVE
SAO2 % BLDCOA: 91 % (ref 95–99)
SAO2 % BLDCOA: 92.1 % (ref 95–99)
SARS-COV-2 RNA RESP QL NAA+PROBE: DETECTED
SARS-COV-2 RNA RESP QL NAA+PROBE: NOT DETECTED
SODIUM SERPL-SCNC: 131 MMOL/L (ref 136–145)
SODIUM SERPL-SCNC: 136 MMOL/L (ref 136–145)
SODIUM SERPL-SCNC: 137 MMOL/L (ref 136–145)
SODIUM SERPL-SCNC: 137 MMOL/L (ref 136–145)
SODIUM SERPL-SCNC: 138 MMOL/L (ref 136–145)
SODIUM SERPL-SCNC: 138 MMOL/L (ref 136–145)
SODIUM SERPL-SCNC: 139 MMOL/L (ref 136–145)
SODIUM SERPL-SCNC: 141 MMOL/L (ref 136–145)
SODIUM SERPL-SCNC: 142 MMOL/L (ref 136–145)
SODIUM SERPL-SCNC: 142 MMOL/L (ref 136–145)
SODIUM SERPL-SCNC: 144 MMOL/L (ref 136–145)
SODIUM SERPL-SCNC: 144 MMOL/L (ref 136–145)
SODIUM SERPL-SCNC: 145 MMOL/L (ref 136–145)
SP GR UR STRIP: 1.02 (ref 1–1.03)
SQUAMOUS #/AREA URNS HPF: ABNORMAL /HPF
TRIGL SERPL-MCNC: 109 MG/DL (ref 0–150)
TROPONIN T SERPL-MCNC: <0.01 NG/ML (ref 0–0.03)
UROBILINOGEN UR QL STRIP: ABNORMAL
VIT B12 BLD-MCNC: 216 PG/ML (ref 211–946)
VLDLC SERPL-MCNC: 20 MG/DL (ref 5–40)
WBC # BLD AUTO: 11.29 10*3/MM3 (ref 3.4–10.8)
WBC # BLD AUTO: 13.69 10*3/MM3 (ref 3.4–10.8)
WBC # BLD AUTO: 3.42 10*3/MM3 (ref 3.4–10.8)
WBC # BLD AUTO: 5.04 10*3/MM3 (ref 3.4–10.8)
WBC # BLD AUTO: 5.49 10*3/MM3 (ref 3.4–10.8)
WBC # BLD AUTO: 5.99 10*3/MM3 (ref 3.4–10.8)
WBC # BLD AUTO: 6.74 10*3/MM3 (ref 3.4–10.8)
WBC # BLD AUTO: 6.77 10*3/MM3 (ref 3.4–10.8)
WBC # BLD AUTO: 7.88 10*3/MM3 (ref 3.4–10.8)
WBC # BLD AUTO: 7.97 10*3/MM3 (ref 3.4–10.8)
WBC # BLD AUTO: 8.22 10*3/MM3 (ref 3.4–10.8)
WBC # BLD AUTO: 9.2 10*3/MM3 (ref 3.4–10.8)
WBC # BLD AUTO: 9.67 10*3/MM3 (ref 3.4–10.8)
WBC MORPH BLD: NORMAL
WBC UR QL AUTO: ABNORMAL /HPF
WHOLE BLOOD HOLD SPECIMEN: NORMAL

## 2021-01-01 PROCEDURE — 94799 UNLISTED PULMONARY SVC/PX: CPT

## 2021-01-01 PROCEDURE — 71045 X-RAY EXAM CHEST 1 VIEW: CPT

## 2021-01-01 PROCEDURE — 82962 GLUCOSE BLOOD TEST: CPT

## 2021-01-01 PROCEDURE — 25010000002 ENOXAPARIN PER 10 MG: Performed by: HOSPITALIST

## 2021-01-01 PROCEDURE — 80053 COMPREHEN METABOLIC PANEL: CPT | Performed by: EMERGENCY MEDICINE

## 2021-01-01 PROCEDURE — 87899 AGENT NOS ASSAY W/OPTIC: CPT | Performed by: INTERNAL MEDICINE

## 2021-01-01 PROCEDURE — 85007 BL SMEAR W/DIFF WBC COUNT: CPT | Performed by: HOSPITALIST

## 2021-01-01 PROCEDURE — 80053 COMPREHEN METABOLIC PANEL: CPT | Performed by: INTERNAL MEDICINE

## 2021-01-01 PROCEDURE — 94660 CPAP INITIATION&MGMT: CPT

## 2021-01-01 PROCEDURE — 99292 CRITICAL CARE ADDL 30 MIN: CPT | Performed by: INTERNAL MEDICINE

## 2021-01-01 PROCEDURE — 25010000002 MORPHINE PER 10 MG: Performed by: INTERNAL MEDICINE

## 2021-01-01 PROCEDURE — 83036 HEMOGLOBIN GLYCOSYLATED A1C: CPT | Performed by: HOSPITALIST

## 2021-01-01 PROCEDURE — G0463 HOSPITAL OUTPT CLINIC VISIT: HCPCS | Performed by: NURSE PRACTITIONER

## 2021-01-01 PROCEDURE — 25010000002 ENOXAPARIN PER 10 MG: Performed by: INTERNAL MEDICINE

## 2021-01-01 PROCEDURE — 25010000002 CEFTRIAXONE PER 250 MG: Performed by: HOSPITALIST

## 2021-01-01 PROCEDURE — 85379 FIBRIN DEGRADATION QUANT: CPT | Performed by: INTERNAL MEDICINE

## 2021-01-01 PROCEDURE — 80053 COMPREHEN METABOLIC PANEL: CPT | Performed by: NURSE PRACTITIONER

## 2021-01-01 PROCEDURE — 99291 CRITICAL CARE FIRST HOUR: CPT | Performed by: INTERNAL MEDICINE

## 2021-01-01 PROCEDURE — 71250 CT THORAX DX C-: CPT

## 2021-01-01 PROCEDURE — XW033H5 INTRODUCTION OF TOCILIZUMAB INTO PERIPHERAL VEIN, PERCUTANEOUS APPROACH, NEW TECHNOLOGY GROUP 5: ICD-10-PCS | Performed by: PHYSICIAN ASSISTANT

## 2021-01-01 PROCEDURE — 84100 ASSAY OF PHOSPHORUS: CPT | Performed by: HOSPITALIST

## 2021-01-01 PROCEDURE — 83735 ASSAY OF MAGNESIUM: CPT | Performed by: INTERNAL MEDICINE

## 2021-01-01 PROCEDURE — 82728 ASSAY OF FERRITIN: CPT | Performed by: INTERNAL MEDICINE

## 2021-01-01 PROCEDURE — 83050 HGB METHEMOGLOBIN QUAN: CPT | Performed by: INTERNAL MEDICINE

## 2021-01-01 PROCEDURE — 83735 ASSAY OF MAGNESIUM: CPT | Performed by: HOSPITALIST

## 2021-01-01 PROCEDURE — 85027 COMPLETE CBC AUTOMATED: CPT | Performed by: INTERNAL MEDICINE

## 2021-01-01 PROCEDURE — 85652 RBC SED RATE AUTOMATED: CPT | Performed by: INTERNAL MEDICINE

## 2021-01-01 PROCEDURE — 85025 COMPLETE CBC W/AUTO DIFF WBC: CPT | Performed by: HOSPITALIST

## 2021-01-01 PROCEDURE — 25010000002 FUROSEMIDE PER 20 MG: Performed by: INTERNAL MEDICINE

## 2021-01-01 PROCEDURE — 85025 COMPLETE CBC W/AUTO DIFF WBC: CPT | Performed by: INTERNAL MEDICINE

## 2021-01-01 PROCEDURE — 99214 OFFICE O/P EST MOD 30 MIN: CPT | Performed by: NURSE PRACTITIONER

## 2021-01-01 PROCEDURE — 83605 ASSAY OF LACTIC ACID: CPT | Performed by: EMERGENCY MEDICINE

## 2021-01-01 PROCEDURE — 82375 ASSAY CARBOXYHB QUANT: CPT | Performed by: HOSPITALIST

## 2021-01-01 PROCEDURE — 85025 COMPLETE CBC W/AUTO DIFF WBC: CPT | Performed by: PHYSICIAN ASSISTANT

## 2021-01-01 PROCEDURE — 25010000002 ONDANSETRON PER 1 MG: Performed by: HOSPITALIST

## 2021-01-01 PROCEDURE — 25010000002 AZITHROMYCIN PER 500 MG: Performed by: INTERNAL MEDICINE

## 2021-01-01 PROCEDURE — 25010000002 DEXAMETHASONE PER 1 MG: Performed by: PHYSICIAN ASSISTANT

## 2021-01-01 PROCEDURE — 97530 THERAPEUTIC ACTIVITIES: CPT

## 2021-01-01 PROCEDURE — 85025 COMPLETE CBC W/AUTO DIFF WBC: CPT | Performed by: NURSE PRACTITIONER

## 2021-01-01 PROCEDURE — 87086 URINE CULTURE/COLONY COUNT: CPT | Performed by: INTERNAL MEDICINE

## 2021-01-01 PROCEDURE — 83036 HEMOGLOBIN GLYCOSYLATED A1C: CPT | Performed by: NURSE PRACTITIONER

## 2021-01-01 PROCEDURE — 25010000002 DEXAMETHASONE PER 1 MG: Performed by: HOSPITALIST

## 2021-01-01 PROCEDURE — 82375 ASSAY CARBOXYHB QUANT: CPT | Performed by: INTERNAL MEDICINE

## 2021-01-01 PROCEDURE — 87070 CULTURE OTHR SPECIMN AEROBIC: CPT | Performed by: INTERNAL MEDICINE

## 2021-01-01 PROCEDURE — 96372 THER/PROPH/DIAG INJ SC/IM: CPT | Performed by: NURSE PRACTITIONER

## 2021-01-01 PROCEDURE — 99233 SBSQ HOSP IP/OBS HIGH 50: CPT | Performed by: INTERNAL MEDICINE

## 2021-01-01 PROCEDURE — 25010000002 DEXAMETHASONE PER 1 MG: Performed by: INTERNAL MEDICINE

## 2021-01-01 PROCEDURE — 82607 VITAMIN B-12: CPT | Performed by: NURSE PRACTITIONER

## 2021-01-01 PROCEDURE — 25010000002 CEFEPIME PER 500 MG: Performed by: INTERNAL MEDICINE

## 2021-01-01 PROCEDURE — 63710000001 INSULIN DETEMIR PER 5 UNITS: Performed by: INTERNAL MEDICINE

## 2021-01-01 PROCEDURE — 94760 N-INVAS EAR/PLS OXIMETRY 1: CPT

## 2021-01-01 PROCEDURE — 82009 KETONE BODYS QUAL: CPT | Performed by: HOSPITALIST

## 2021-01-01 PROCEDURE — 85730 THROMBOPLASTIN TIME PARTIAL: CPT | Performed by: ORTHOPAEDIC SURGERY

## 2021-01-01 PROCEDURE — 97110 THERAPEUTIC EXERCISES: CPT

## 2021-01-01 PROCEDURE — 82805 BLOOD GASES W/O2 SATURATION: CPT | Performed by: HOSPITALIST

## 2021-01-01 PROCEDURE — 25010000002 AZITHROMYCIN PER 500 MG: Performed by: EMERGENCY MEDICINE

## 2021-01-01 PROCEDURE — 86140 C-REACTIVE PROTEIN: CPT | Performed by: INTERNAL MEDICINE

## 2021-01-01 PROCEDURE — 81001 URINALYSIS AUTO W/SCOPE: CPT | Performed by: EMERGENCY MEDICINE

## 2021-01-01 PROCEDURE — 85610 PROTHROMBIN TIME: CPT | Performed by: ORTHOPAEDIC SURGERY

## 2021-01-01 PROCEDURE — 84100 ASSAY OF PHOSPHORUS: CPT | Performed by: INTERNAL MEDICINE

## 2021-01-01 PROCEDURE — 83880 ASSAY OF NATRIURETIC PEPTIDE: CPT | Performed by: INTERNAL MEDICINE

## 2021-01-01 PROCEDURE — 80048 BASIC METABOLIC PNL TOTAL CA: CPT | Performed by: HOSPITALIST

## 2021-01-01 PROCEDURE — 25010000002 TOCILIZUMAB 400 MG/20ML SOLUTION 20 ML VIAL: Performed by: INTERNAL MEDICINE

## 2021-01-01 PROCEDURE — 99223 1ST HOSP IP/OBS HIGH 75: CPT | Performed by: HOSPITALIST

## 2021-01-01 PROCEDURE — U0003 INFECTIOUS AGENT DETECTION BY NUCLEIC ACID (DNA OR RNA); SEVERE ACUTE RESPIRATORY SYNDROME CORONAVIRUS 2 (SARS-COV-2) (CORONAVIRUS DISEASE [COVID-19]), AMPLIFIED PROBE TECHNIQUE, MAKING USE OF HIGH THROUGHPUT TECHNOLOGIES AS DESCRIBED BY CMS-2020-01-R: HCPCS

## 2021-01-01 PROCEDURE — 95251 CONT GLUC MNTR ANALYSIS I&R: CPT | Performed by: NURSE PRACTITIONER

## 2021-01-01 PROCEDURE — 85025 COMPLETE CBC W/AUTO DIFF WBC: CPT | Performed by: EMERGENCY MEDICINE

## 2021-01-01 PROCEDURE — 63710000001 INSULIN REGULAR HUMAN PER 5 UNITS: Performed by: HOSPITALIST

## 2021-01-01 PROCEDURE — C9803 HOPD COVID-19 SPEC COLLECT: HCPCS

## 2021-01-01 PROCEDURE — U0003 INFECTIOUS AGENT DETECTION BY NUCLEIC ACID (DNA OR RNA); SEVERE ACUTE RESPIRATORY SYNDROME CORONAVIRUS 2 (SARS-COV-2) (CORONAVIRUS DISEASE [COVID-19]), AMPLIFIED PROBE TECHNIQUE, MAKING USE OF HIGH THROUGHPUT TECHNOLOGIES AS DESCRIBED BY CMS-2020-01-R: HCPCS | Performed by: EMERGENCY MEDICINE

## 2021-01-01 PROCEDURE — G0463 HOSPITAL OUTPT CLINIC VISIT: HCPCS | Performed by: ORTHOPAEDIC SURGERY

## 2021-01-01 PROCEDURE — 36415 COLL VENOUS BLD VENIPUNCTURE: CPT | Performed by: HOSPITALIST

## 2021-01-01 PROCEDURE — 25010000002 FUROSEMIDE PER 20 MG: Performed by: PHYSICIAN ASSISTANT

## 2021-01-01 PROCEDURE — 97161 PT EVAL LOW COMPLEX 20 MIN: CPT

## 2021-01-01 PROCEDURE — 25010000002 TOCILIZUMAB 400 MG/20ML SOLUTION 20 ML VIAL: Performed by: PHYSICIAN ASSISTANT

## 2021-01-01 PROCEDURE — 80053 COMPREHEN METABOLIC PANEL: CPT | Performed by: HOSPITALIST

## 2021-01-01 PROCEDURE — 84484 ASSAY OF TROPONIN QUANT: CPT | Performed by: EMERGENCY MEDICINE

## 2021-01-01 PROCEDURE — 80048 BASIC METABOLIC PNL TOTAL CA: CPT | Performed by: INTERNAL MEDICINE

## 2021-01-01 PROCEDURE — 25010000003 POTASSIUM CHLORIDE 10 MEQ/100ML SOLUTION: Performed by: INTERNAL MEDICINE

## 2021-01-01 PROCEDURE — 82306 VITAMIN D 25 HYDROXY: CPT | Performed by: NURSE PRACTITIONER

## 2021-01-01 PROCEDURE — 97165 OT EVAL LOW COMPLEX 30 MIN: CPT

## 2021-01-01 PROCEDURE — 25010000002 VANCOMYCIN 5 G RECONSTITUTED SOLUTION: Performed by: INTERNAL MEDICINE

## 2021-01-01 PROCEDURE — 87040 BLOOD CULTURE FOR BACTERIA: CPT | Performed by: EMERGENCY MEDICINE

## 2021-01-01 PROCEDURE — 99284 EMERGENCY DEPT VISIT MOD MDM: CPT

## 2021-01-01 PROCEDURE — 99214 OFFICE O/P EST MOD 30 MIN: CPT | Performed by: SPECIALIST

## 2021-01-01 PROCEDURE — 99490 CHRNC CARE MGMT STAFF 1ST 20: CPT | Performed by: NURSE PRACTITIONER

## 2021-01-01 PROCEDURE — 80069 RENAL FUNCTION PANEL: CPT | Performed by: PHYSICIAN ASSISTANT

## 2021-01-01 PROCEDURE — 25010000002 CEFTRIAXONE PER 250 MG: Performed by: EMERGENCY MEDICINE

## 2021-01-01 PROCEDURE — XW033E5 INTRODUCTION OF REMDESIVIR ANTI-INFECTIVE INTO PERIPHERAL VEIN, PERCUTANEOUS APPROACH, NEW TECHNOLOGY GROUP 5: ICD-10-PCS | Performed by: INTERNAL MEDICINE

## 2021-01-01 PROCEDURE — 3046F HEMOGLOBIN A1C LEVEL >9.0%: CPT | Performed by: NURSE PRACTITIONER

## 2021-01-01 PROCEDURE — 63710000001 INSULIN LISPRO (HUMAN) PER 5 UNITS: Performed by: INTERNAL MEDICINE

## 2021-01-01 PROCEDURE — 25010000002 FLUCONAZOLE PER 200 MG: Performed by: INTERNAL MEDICINE

## 2021-01-01 PROCEDURE — 94640 AIRWAY INHALATION TREATMENT: CPT

## 2021-01-01 PROCEDURE — 36600 WITHDRAWAL OF ARTERIAL BLOOD: CPT | Performed by: HOSPITALIST

## 2021-01-01 PROCEDURE — 82805 BLOOD GASES W/O2 SATURATION: CPT | Performed by: INTERNAL MEDICINE

## 2021-01-01 PROCEDURE — 87205 SMEAR GRAM STAIN: CPT | Performed by: INTERNAL MEDICINE

## 2021-01-01 PROCEDURE — 36600 WITHDRAWAL OF ARTERIAL BLOOD: CPT | Performed by: INTERNAL MEDICINE

## 2021-01-01 PROCEDURE — 83050 HGB METHEMOGLOBIN QUAN: CPT | Performed by: HOSPITALIST

## 2021-01-01 PROCEDURE — 83735 ASSAY OF MAGNESIUM: CPT | Performed by: EMERGENCY MEDICINE

## 2021-01-01 PROCEDURE — 83735 ASSAY OF MAGNESIUM: CPT | Performed by: PHYSICIAN ASSISTANT

## 2021-01-01 PROCEDURE — 95819 EEG AWAKE AND ASLEEP: CPT

## 2021-01-01 PROCEDURE — 84145 PROCALCITONIN (PCT): CPT | Performed by: INTERNAL MEDICINE

## 2021-01-01 PROCEDURE — 80061 LIPID PANEL: CPT | Performed by: NURSE PRACTITIONER

## 2021-01-01 RX ORDER — ASPIRIN 81 MG/1
81 TABLET ORAL NIGHTLY
COMMUNITY

## 2021-01-01 RX ORDER — MORPHINE SULFATE 2 MG/ML
2 INJECTION, SOLUTION INTRAMUSCULAR; INTRAVENOUS
Status: DISCONTINUED | OUTPATIENT
Start: 2021-01-01 | End: 2021-01-01 | Stop reason: HOSPADM

## 2021-01-01 RX ORDER — SODIUM CHLORIDE 0.9 % (FLUSH) 0.9 %
3 SYRINGE (ML) INJECTION EVERY 12 HOURS SCHEDULED
Status: DISCONTINUED | OUTPATIENT
Start: 2021-01-01 | End: 2021-01-01

## 2021-01-01 RX ORDER — ACETAMINOPHEN 325 MG/1
975 TABLET ORAL ONCE
Status: COMPLETED | OUTPATIENT
Start: 2021-01-01 | End: 2021-01-01

## 2021-01-01 RX ORDER — HYDROCHLOROTHIAZIDE 25 MG/1
25 TABLET ORAL DAILY
COMMUNITY
Start: 2021-01-01

## 2021-01-01 RX ORDER — ARFORMOTEROL TARTRATE 15 UG/2ML
15 SOLUTION RESPIRATORY (INHALATION)
Status: DISCONTINUED | OUTPATIENT
Start: 2021-01-01 | End: 2021-01-01

## 2021-01-01 RX ORDER — NICOTINE POLACRILEX 4 MG
15 LOZENGE BUCCAL
Status: DISCONTINUED | OUTPATIENT
Start: 2021-01-01 | End: 2021-01-01

## 2021-01-01 RX ORDER — SODIUM CHLORIDE 0.9 % (FLUSH) 0.9 %
10 SYRINGE (ML) INJECTION AS NEEDED
Status: DISCONTINUED | OUTPATIENT
Start: 2021-01-01 | End: 2021-01-01 | Stop reason: HOSPADM

## 2021-01-01 RX ORDER — OXYCODONE HYDROCHLORIDE 5 MG/1
5 TABLET ORAL EVERY 4 HOURS PRN
Status: DISPENSED | OUTPATIENT
Start: 2021-01-01 | End: 2021-01-01

## 2021-01-01 RX ORDER — POTASSIUM CHLORIDE 750 MG/1
30 CAPSULE, EXTENDED RELEASE ORAL ONCE
Status: COMPLETED | OUTPATIENT
Start: 2021-01-01 | End: 2021-01-01

## 2021-01-01 RX ORDER — LORAZEPAM 0.5 MG/1
0.5 TABLET ORAL
Status: DISCONTINUED | OUTPATIENT
Start: 2021-01-01 | End: 2021-01-01 | Stop reason: HOSPADM

## 2021-01-01 RX ORDER — ACETAMINOPHEN 500 MG
1000 TABLET ORAL ONCE
Status: DISCONTINUED | OUTPATIENT
Start: 2021-01-01 | End: 2021-01-01

## 2021-01-01 RX ORDER — FUROSEMIDE 10 MG/ML
80 INJECTION INTRAMUSCULAR; INTRAVENOUS ONCE
Status: COMPLETED | OUTPATIENT
Start: 2021-01-01 | End: 2021-01-01

## 2021-01-01 RX ORDER — CEFAZOLIN SODIUM 2 G/100ML
2 INJECTION, SOLUTION INTRAVENOUS ONCE
Status: CANCELLED | OUTPATIENT
Start: 2021-01-01 | End: 2021-01-01

## 2021-01-01 RX ORDER — CARVEDILOL 12.5 MG/1
12.5 TABLET ORAL 2 TIMES DAILY WITH MEALS
Qty: 180 TABLET | Refills: 5 | Status: SHIPPED | OUTPATIENT
Start: 2021-01-01 | End: 2021-01-01 | Stop reason: SDUPTHER

## 2021-01-01 RX ORDER — DEXAMETHASONE SODIUM PHOSPHATE 10 MG/ML
6 INJECTION INTRAMUSCULAR; INTRAVENOUS DAILY
Status: DISCONTINUED | OUTPATIENT
Start: 2021-01-01 | End: 2021-01-01

## 2021-01-01 RX ORDER — SIMVASTATIN 20 MG
TABLET ORAL
COMMUNITY
Start: 2021-01-01 | End: 2021-01-01 | Stop reason: SDUPTHER

## 2021-01-01 RX ORDER — POTASSIUM CHLORIDE 750 MG/1
40 CAPSULE, EXTENDED RELEASE ORAL ONCE
Status: COMPLETED | OUTPATIENT
Start: 2021-01-01 | End: 2021-01-01

## 2021-01-01 RX ORDER — CEFAZOLIN SODIUM 2 G/100ML
2 INJECTION, SOLUTION INTRAVENOUS ONCE
Status: DISCONTINUED | OUTPATIENT
Start: 2021-01-01 | End: 2021-01-01 | Stop reason: HOSPADM

## 2021-01-01 RX ORDER — DEXTROSE MONOHYDRATE 100 MG/ML
25 INJECTION, SOLUTION INTRAVENOUS
Status: DISCONTINUED | OUTPATIENT
Start: 2021-01-01 | End: 2021-01-01

## 2021-01-01 RX ORDER — CYANOCOBALAMIN 1000 UG/ML
1000 INJECTION, SOLUTION INTRAMUSCULAR; SUBCUTANEOUS
Status: SHIPPED | OUTPATIENT
Start: 2021-01-01

## 2021-01-01 RX ORDER — PREGABALIN 75 MG/1
75 CAPSULE ORAL ONCE
Status: DISCONTINUED | OUTPATIENT
Start: 2021-01-01 | End: 2021-01-01

## 2021-01-01 RX ORDER — CETIRIZINE HYDROCHLORIDE 10 MG/1
TABLET ORAL
Qty: 90 TABLET | Refills: 1 | Status: SHIPPED | OUTPATIENT
Start: 2021-01-01

## 2021-01-01 RX ORDER — ONDANSETRON 2 MG/ML
4 INJECTION INTRAMUSCULAR; INTRAVENOUS EVERY 6 HOURS PRN
Status: DISCONTINUED | OUTPATIENT
Start: 2021-01-01 | End: 2021-01-01 | Stop reason: HOSPADM

## 2021-01-01 RX ORDER — LISINOPRIL 40 MG/1
TABLET ORAL
Qty: 90 TABLET | Refills: 1 | Status: SHIPPED | OUTPATIENT
Start: 2021-01-01

## 2021-01-01 RX ORDER — SCOLOPAMINE TRANSDERMAL SYSTEM 1 MG/1
1 PATCH, EXTENDED RELEASE TRANSDERMAL CONTINUOUS
Status: DISCONTINUED | OUTPATIENT
Start: 2021-01-01 | End: 2021-01-01

## 2021-01-01 RX ORDER — SCOLOPAMINE TRANSDERMAL SYSTEM 1 MG/1
1 PATCH, EXTENDED RELEASE TRANSDERMAL CONTINUOUS
Status: CANCELLED | OUTPATIENT
Start: 2021-01-01 | End: 2021-01-01

## 2021-01-01 RX ORDER — SODIUM CHLORIDE 450 MG/100ML
250 INJECTION, SOLUTION INTRAVENOUS CONTINUOUS PRN
Status: DISCONTINUED | OUTPATIENT
Start: 2021-01-01 | End: 2021-01-01

## 2021-01-01 RX ORDER — MAGNESIUM SULFATE 1 G/100ML
1 INJECTION INTRAVENOUS
Status: DISPENSED | OUTPATIENT
Start: 2021-01-01 | End: 2021-01-01

## 2021-01-01 RX ORDER — HYDROCHLOROTHIAZIDE 25 MG/1
25 TABLET ORAL DAILY
COMMUNITY
End: 2021-01-01

## 2021-01-01 RX ORDER — OXYCODONE HCL 10 MG/1
10 TABLET, FILM COATED, EXTENDED RELEASE ORAL ONCE
Status: CANCELLED | OUTPATIENT
Start: 2021-01-01 | End: 2021-01-01

## 2021-01-01 RX ORDER — FUROSEMIDE 10 MG/ML
40 INJECTION INTRAMUSCULAR; INTRAVENOUS EVERY 12 HOURS
Status: DISCONTINUED | OUTPATIENT
Start: 2021-01-01 | End: 2021-01-01

## 2021-01-01 RX ORDER — TRANEXAMIC ACID 10 MG/ML
1000 INJECTION, SOLUTION INTRAVENOUS ONCE
Status: CANCELLED | OUTPATIENT
Start: 2021-01-01 | End: 2021-01-01

## 2021-01-01 RX ORDER — ECHINACEA PURPUREA EXTRACT 125 MG
2 TABLET ORAL AS NEEDED
Status: DISCONTINUED | OUTPATIENT
Start: 2021-01-01 | End: 2021-01-01 | Stop reason: HOSPADM

## 2021-01-01 RX ORDER — DEXTROSE, SODIUM CHLORIDE, AND POTASSIUM CHLORIDE 5; .45; .15 G/100ML; G/100ML; G/100ML
150 INJECTION INTRAVENOUS CONTINUOUS PRN
Status: DISCONTINUED | OUTPATIENT
Start: 2021-01-01 | End: 2021-01-01

## 2021-01-01 RX ORDER — TRAMADOL HYDROCHLORIDE 50 MG/1
25 TABLET ORAL EVERY 6 HOURS PRN
Status: DISCONTINUED | OUTPATIENT
Start: 2021-01-01 | End: 2021-01-01

## 2021-01-01 RX ORDER — POLYETHYLENE GLYCOL 3350 17 G/17G
17 POWDER, FOR SOLUTION ORAL DAILY PRN
Status: DISCONTINUED | OUTPATIENT
Start: 2021-01-01 | End: 2021-01-01

## 2021-01-01 RX ORDER — DEXTROSE MONOHYDRATE 25 G/50ML
10-50 INJECTION, SOLUTION INTRAVENOUS
Status: DISCONTINUED | OUTPATIENT
Start: 2021-01-01 | End: 2021-01-01 | Stop reason: HOSPADM

## 2021-01-01 RX ORDER — DEXTROSE MONOHYDRATE 100 MG/ML
25 INJECTION, SOLUTION INTRAVENOUS
Status: DISCONTINUED | OUTPATIENT
Start: 2021-01-01 | End: 2021-01-01 | Stop reason: SDUPTHER

## 2021-01-01 RX ORDER — SIMVASTATIN 20 MG
TABLET ORAL
Qty: 90 TABLET | OUTPATIENT
Start: 2021-01-01

## 2021-01-01 RX ORDER — FAMOTIDINE 20 MG/1
20 TABLET, FILM COATED ORAL
Status: DISCONTINUED | OUTPATIENT
Start: 2021-01-01 | End: 2021-01-01

## 2021-01-01 RX ORDER — BISACODYL 10 MG
10 SUPPOSITORY, RECTAL RECTAL DAILY PRN
Status: DISCONTINUED | OUTPATIENT
Start: 2021-01-01 | End: 2021-01-01

## 2021-01-01 RX ORDER — TIOTROPIUM BROMIDE AND OLODATEROL 3.124; 2.736 UG/1; UG/1
SPRAY, METERED RESPIRATORY (INHALATION)
COMMUNITY
Start: 2021-01-01 | End: 2021-01-01

## 2021-01-01 RX ORDER — FLUCONAZOLE 2 MG/ML
200 INJECTION, SOLUTION INTRAVENOUS ONCE
Status: DISCONTINUED | OUTPATIENT
Start: 2021-01-01 | End: 2021-01-01

## 2021-01-01 RX ORDER — CEFAZOLIN SODIUM IN 0.9 % NACL 3 G/100 ML
3 INTRAVENOUS SOLUTION, PIGGYBACK (ML) INTRAVENOUS ONCE
Status: DISCONTINUED | OUTPATIENT
Start: 2021-01-01 | End: 2021-01-01 | Stop reason: DRUGHIGH

## 2021-01-01 RX ORDER — SODIUM CHLORIDE 9 MG/ML
125 INJECTION, SOLUTION INTRAVENOUS CONTINUOUS
Status: DISCONTINUED | OUTPATIENT
Start: 2021-01-01 | End: 2021-01-01

## 2021-01-01 RX ORDER — DEXTROSE, SODIUM CHLORIDE, AND POTASSIUM CHLORIDE 5; .9; .15 G/100ML; G/100ML; G/100ML
150 INJECTION INTRAVENOUS CONTINUOUS PRN
Status: DISCONTINUED | OUTPATIENT
Start: 2021-01-01 | End: 2021-01-01

## 2021-01-01 RX ORDER — HALOPERIDOL 5 MG/ML
5 INJECTION INTRAMUSCULAR EVERY 6 HOURS PRN
Status: DISCONTINUED | OUTPATIENT
Start: 2021-01-01 | End: 2021-01-01 | Stop reason: HOSPADM

## 2021-01-01 RX ORDER — AMOXICILLIN 250 MG
2 CAPSULE ORAL 2 TIMES DAILY
Status: DISCONTINUED | OUTPATIENT
Start: 2021-01-01 | End: 2021-01-01

## 2021-01-01 RX ORDER — HYDROCODONE BITARTRATE AND ACETAMINOPHEN 5; 325 MG/1; MG/1
1 TABLET ORAL EVERY 6 HOURS PRN
Status: DISCONTINUED | OUTPATIENT
Start: 2021-01-01 | End: 2021-01-01 | Stop reason: HOSPADM

## 2021-01-01 RX ORDER — DEXTROSE MONOHYDRATE 25 G/50ML
10-50 INJECTION, SOLUTION INTRAVENOUS
Status: DISCONTINUED | OUTPATIENT
Start: 2021-01-01 | End: 2021-01-01

## 2021-01-01 RX ORDER — CHOLECALCIFEROL (VITAMIN D3) 125 MCG
5 CAPSULE ORAL NIGHTLY PRN
Status: DISCONTINUED | OUTPATIENT
Start: 2021-01-01 | End: 2021-01-01 | Stop reason: HOSPADM

## 2021-01-01 RX ORDER — LORAZEPAM 2 MG/ML
0.5 INJECTION INTRAMUSCULAR
Status: DISCONTINUED | OUTPATIENT
Start: 2021-01-01 | End: 2021-01-01 | Stop reason: HOSPADM

## 2021-01-01 RX ORDER — POTASSIUM CHLORIDE 7.45 MG/ML
10 INJECTION INTRAVENOUS
Status: COMPLETED | OUTPATIENT
Start: 2021-01-01 | End: 2021-01-01

## 2021-01-01 RX ORDER — DEXTROSE MONOHYDRATE 100 MG/ML
50-250 INJECTION, SOLUTION INTRAVENOUS
Status: DISCONTINUED | OUTPATIENT
Start: 2021-01-01 | End: 2021-01-01 | Stop reason: HOSPADM

## 2021-01-01 RX ORDER — POTASSIUM CHLORIDE 1.5 G/1.77G
20 POWDER, FOR SOLUTION ORAL ONCE
Status: COMPLETED | OUTPATIENT
Start: 2021-01-01 | End: 2021-01-01

## 2021-01-01 RX ORDER — NITROGLYCERIN 0.4 MG/1
0.4 TABLET SUBLINGUAL
Status: DISCONTINUED | OUTPATIENT
Start: 2021-01-01 | End: 2021-01-01 | Stop reason: HOSPADM

## 2021-01-01 RX ORDER — ACETAMINOPHEN 500 MG
1000 TABLET ORAL ONCE
Status: CANCELLED | OUTPATIENT
Start: 2021-01-01 | End: 2021-01-01

## 2021-01-01 RX ORDER — FUROSEMIDE 10 MG/ML
20 INJECTION INTRAMUSCULAR; INTRAVENOUS ONCE
Status: DISCONTINUED | OUTPATIENT
Start: 2021-01-01 | End: 2021-01-01

## 2021-01-01 RX ORDER — FLUTICASONE PROPIONATE 50 MCG
SPRAY, SUSPENSION (ML) NASAL
Qty: 48 G | Refills: 1 | Status: SHIPPED | OUTPATIENT
Start: 2021-01-01

## 2021-01-01 RX ORDER — PREGABALIN 75 MG/1
75 CAPSULE ORAL ONCE
Status: CANCELLED | OUTPATIENT
Start: 2021-01-01 | End: 2021-01-01

## 2021-01-01 RX ORDER — OXYCODONE HCL 10 MG/1
10 TABLET, FILM COATED, EXTENDED RELEASE ORAL ONCE
Status: DISCONTINUED | OUTPATIENT
Start: 2021-01-01 | End: 2021-01-01

## 2021-01-01 RX ORDER — ATENOLOL 25 MG/1
25 TABLET ORAL 2 TIMES DAILY
COMMUNITY
Start: 2021-01-01 | End: 2021-01-01

## 2021-01-01 RX ORDER — ATROPINE SULFATE 10 MG/ML
2 SOLUTION/ DROPS OPHTHALMIC
Status: DISCONTINUED | OUTPATIENT
Start: 2021-01-01 | End: 2021-01-01 | Stop reason: HOSPADM

## 2021-01-01 RX ORDER — LORAZEPAM 2 MG/ML
1 INJECTION INTRAMUSCULAR EVERY 4 HOURS PRN
Status: ACTIVE | OUTPATIENT
Start: 2021-01-01 | End: 2021-01-01

## 2021-01-01 RX ORDER — LORAZEPAM 2 MG/ML
0.5 CONCENTRATE ORAL
Status: DISCONTINUED | OUTPATIENT
Start: 2021-01-01 | End: 2021-01-01 | Stop reason: HOSPADM

## 2021-01-01 RX ORDER — IPRATROPIUM BROMIDE AND ALBUTEROL SULFATE 2.5; .5 MG/3ML; MG/3ML
3 SOLUTION RESPIRATORY (INHALATION) ONCE
Status: COMPLETED | OUTPATIENT
Start: 2021-01-01 | End: 2021-01-01

## 2021-01-01 RX ORDER — ALPRAZOLAM 0.25 MG/1
1 TABLET ORAL 3 TIMES DAILY PRN
Status: DISCONTINUED | OUTPATIENT
Start: 2021-01-01 | End: 2021-01-01 | Stop reason: HOSPADM

## 2021-01-01 RX ORDER — SODIUM CHLORIDE 0.9 % (FLUSH) 0.9 %
10 SYRINGE (ML) INJECTION AS NEEDED
Status: DISCONTINUED | OUTPATIENT
Start: 2021-01-01 | End: 2021-01-01

## 2021-01-01 RX ORDER — TRANEXAMIC ACID 10 MG/ML
1000 INJECTION, SOLUTION INTRAVENOUS ONCE
Status: DISCONTINUED | OUTPATIENT
Start: 2021-01-01 | End: 2021-01-01 | Stop reason: HOSPADM

## 2021-01-01 RX ORDER — NICOTINE POLACRILEX 4 MG
15 LOZENGE BUCCAL
Status: DISCONTINUED | OUTPATIENT
Start: 2021-01-01 | End: 2021-01-01 | Stop reason: SDUPTHER

## 2021-01-01 RX ORDER — BISACODYL 5 MG/1
5 TABLET, DELAYED RELEASE ORAL DAILY PRN
Status: DISCONTINUED | OUTPATIENT
Start: 2021-01-01 | End: 2021-01-01

## 2021-01-01 RX ORDER — ASPIRIN 81 MG/1
81 TABLET ORAL DAILY
Status: DISCONTINUED | OUTPATIENT
Start: 2021-01-01 | End: 2021-01-01

## 2021-01-01 RX ORDER — SODIUM CHLORIDE AND POTASSIUM CHLORIDE 150; 900 MG/100ML; MG/100ML
250 INJECTION, SOLUTION INTRAVENOUS CONTINUOUS PRN
Status: DISCONTINUED | OUTPATIENT
Start: 2021-01-01 | End: 2021-01-01

## 2021-01-01 RX ORDER — DEXAMETHASONE SODIUM PHOSPHATE 10 MG/ML
10 INJECTION INTRAMUSCULAR; INTRAVENOUS DAILY
Status: DISCONTINUED | OUTPATIENT
Start: 2021-01-01 | End: 2021-01-01

## 2021-01-01 RX ORDER — CARVEDILOL 12.5 MG/1
12.5 TABLET ORAL 2 TIMES DAILY WITH MEALS
Qty: 180 TABLET | Refills: 5 | Status: SHIPPED | OUTPATIENT
Start: 2021-01-01

## 2021-01-01 RX ORDER — FLUTICASONE PROPIONATE 50 MCG
2 SPRAY, SUSPENSION (ML) NASAL DAILY
Status: DISCONTINUED | OUTPATIENT
Start: 2021-01-01 | End: 2021-01-01

## 2021-01-01 RX ORDER — HALOPERIDOL 2 MG/ML
0.5 SOLUTION ORAL
Status: DISCONTINUED | OUTPATIENT
Start: 2021-01-01 | End: 2021-01-01 | Stop reason: HOSPADM

## 2021-01-01 RX ORDER — SODIUM CHLORIDE AND POTASSIUM CHLORIDE 150; 450 MG/100ML; MG/100ML
250 INJECTION, SOLUTION INTRAVENOUS CONTINUOUS PRN
Status: DISCONTINUED | OUTPATIENT
Start: 2021-01-01 | End: 2021-01-01

## 2021-01-01 RX ORDER — CEFAZOLIN SODIUM IN 0.9 % NACL 3 G/100 ML
3 INTRAVENOUS SOLUTION, PIGGYBACK (ML) INTRAVENOUS ONCE
Status: CANCELLED | OUTPATIENT
Start: 2021-01-01 | End: 2021-01-01

## 2021-01-01 RX ORDER — SODIUM CHLORIDE 9 MG/ML
250 INJECTION, SOLUTION INTRAVENOUS CONTINUOUS PRN
Status: DISCONTINUED | OUTPATIENT
Start: 2021-01-01 | End: 2021-01-01

## 2021-01-01 RX ORDER — DEXTROSE AND SODIUM CHLORIDE 5; .45 G/100ML; G/100ML
150 INJECTION, SOLUTION INTRAVENOUS CONTINUOUS PRN
Status: DISCONTINUED | OUTPATIENT
Start: 2021-01-01 | End: 2021-01-01

## 2021-01-01 RX ORDER — OXYMETAZOLINE HYDROCHLORIDE 0.05 G/100ML
2 SPRAY NASAL 2 TIMES DAILY
Status: DISPENSED | OUTPATIENT
Start: 2021-01-01 | End: 2021-01-01

## 2021-01-01 RX ORDER — GUAIFENESIN 600 MG/1
600 TABLET, EXTENDED RELEASE ORAL EVERY 12 HOURS SCHEDULED
Status: DISCONTINUED | OUTPATIENT
Start: 2021-01-01 | End: 2021-01-01

## 2021-01-01 RX ORDER — HALOPERIDOL 0.5 MG/1
0.5 TABLET ORAL
Status: DISCONTINUED | OUTPATIENT
Start: 2021-01-01 | End: 2021-01-01 | Stop reason: HOSPADM

## 2021-01-01 RX ORDER — DEXAMETHASONE SODIUM PHOSPHATE 10 MG/ML
10 INJECTION INTRAMUSCULAR; INTRAVENOUS 2 TIMES DAILY
Status: DISCONTINUED | OUTPATIENT
Start: 2021-01-01 | End: 2021-01-01

## 2021-01-01 RX ORDER — ACETAMINOPHEN 500 MG
1000 TABLET ORAL 3 TIMES DAILY
Status: DISCONTINUED | OUTPATIENT
Start: 2021-01-01 | End: 2021-01-01

## 2021-01-01 RX ORDER — BUDESONIDE 0.5 MG/2ML
0.5 INHALANT ORAL
Status: DISCONTINUED | OUTPATIENT
Start: 2021-01-01 | End: 2021-01-01

## 2021-01-01 RX ORDER — ONDANSETRON 4 MG/1
4 TABLET, FILM COATED ORAL EVERY 6 HOURS PRN
Status: DISCONTINUED | OUTPATIENT
Start: 2021-01-01 | End: 2021-01-01 | Stop reason: HOSPADM

## 2021-01-01 RX ORDER — FLUCONAZOLE 2 MG/ML
100 INJECTION, SOLUTION INTRAVENOUS DAILY
Status: DISCONTINUED | OUTPATIENT
Start: 2021-01-01 | End: 2021-01-01

## 2021-01-01 RX ORDER — SIMVASTATIN 20 MG
20 TABLET ORAL NIGHTLY
Qty: 90 TABLET | Refills: 1 | Status: SHIPPED | OUTPATIENT
Start: 2021-01-01

## 2021-01-01 RX ORDER — NICOTINE POLACRILEX 4 MG
15 LOZENGE BUCCAL
Status: DISCONTINUED | OUTPATIENT
Start: 2021-01-01 | End: 2021-01-01 | Stop reason: HOSPADM

## 2021-01-01 RX ORDER — POTASSIUM CHLORIDE 7.45 MG/ML
10 INJECTION INTRAVENOUS CONTINUOUS PRN
Status: DISCONTINUED | OUTPATIENT
Start: 2021-01-01 | End: 2021-01-01

## 2021-01-01 RX ORDER — IPRATROPIUM BROMIDE AND ALBUTEROL SULFATE 2.5; .5 MG/3ML; MG/3ML
3 SOLUTION RESPIRATORY (INHALATION)
Status: DISCONTINUED | OUTPATIENT
Start: 2021-01-01 | End: 2021-01-01

## 2021-01-01 RX ORDER — HALOPERIDOL 5 MG/ML
0.5 INJECTION INTRAMUSCULAR
Status: DISCONTINUED | OUTPATIENT
Start: 2021-01-01 | End: 2021-01-01 | Stop reason: HOSPADM

## 2021-01-01 RX ORDER — SODIUM CHLORIDE 0.9 % (FLUSH) 0.9 %
10 SYRINGE (ML) INJECTION EVERY 12 HOURS SCHEDULED
Status: DISCONTINUED | OUTPATIENT
Start: 2021-01-01 | End: 2021-01-01

## 2021-01-01 RX ORDER — DEXTROSE MONOHYDRATE 100 MG/ML
50-250 INJECTION, SOLUTION INTRAVENOUS
Status: DISCONTINUED | OUTPATIENT
Start: 2021-01-01 | End: 2021-01-01

## 2021-01-01 RX ORDER — DEXTROSE AND SODIUM CHLORIDE 5; .9 G/100ML; G/100ML
150 INJECTION, SOLUTION INTRAVENOUS CONTINUOUS PRN
Status: DISCONTINUED | OUTPATIENT
Start: 2021-01-01 | End: 2021-01-01

## 2021-01-01 RX ORDER — MORPHINE SULFATE 20 MG/ML
5 SOLUTION ORAL
Status: DISCONTINUED | OUTPATIENT
Start: 2021-01-01 | End: 2021-01-01 | Stop reason: HOSPADM

## 2021-01-01 RX ADMIN — SODIUM CHLORIDE, PRESERVATIVE FREE 3 ML: 5 INJECTION INTRAVENOUS at 09:43

## 2021-01-01 RX ADMIN — ARFORMOTEROL TARTRATE 15 MCG: 15 SOLUTION RESPIRATORY (INHALATION) at 06:32

## 2021-01-01 RX ADMIN — FLUTICASONE PROPIONATE 2 SPRAY: 50 SPRAY, METERED NASAL at 08:27

## 2021-01-01 RX ADMIN — IPRATROPIUM BROMIDE AND ALBUTEROL SULFATE 3 ML: .5; 2.5 SOLUTION RESPIRATORY (INHALATION) at 00:59

## 2021-01-01 RX ADMIN — ENOXAPARIN SODIUM 40 MG: 40 INJECTION SUBCUTANEOUS at 08:27

## 2021-01-01 RX ADMIN — ARFORMOTEROL TARTRATE 15 MCG: 15 SOLUTION RESPIRATORY (INHALATION) at 19:09

## 2021-01-01 RX ADMIN — INSULIN HUMAN 33.1 UNITS/HR: 1 INJECTION, SOLUTION INTRAVENOUS at 16:41

## 2021-01-01 RX ADMIN — IPRATROPIUM BROMIDE AND ALBUTEROL SULFATE 3 ML: .5; 2.5 SOLUTION RESPIRATORY (INHALATION) at 14:28

## 2021-01-01 RX ADMIN — ASPIRIN 81 MG: 81 TABLET, COATED ORAL at 08:26

## 2021-01-01 RX ADMIN — ARFORMOTEROL TARTRATE 15 MCG: 15 SOLUTION RESPIRATORY (INHALATION) at 18:34

## 2021-01-01 RX ADMIN — DEXAMETHASONE SODIUM PHOSPHATE 10 MG: 10 INJECTION INTRAMUSCULAR; INTRAVENOUS at 21:07

## 2021-01-01 RX ADMIN — DEXAMETHASONE SODIUM PHOSPHATE 10 MG: 10 INJECTION INTRAMUSCULAR; INTRAVENOUS at 08:22

## 2021-01-01 RX ADMIN — FUROSEMIDE 40 MG: 10 INJECTION, SOLUTION INTRAMUSCULAR; INTRAVENOUS at 03:42

## 2021-01-01 RX ADMIN — INSULIN HUMAN 20.3 UNITS/HR: 1 INJECTION, SOLUTION INTRAVENOUS at 18:35

## 2021-01-01 RX ADMIN — POTASSIUM CHLORIDE 20 MEQ: 1.5 POWDER, FOR SOLUTION ORAL at 15:14

## 2021-01-01 RX ADMIN — REMDESIVIR 100 MG: 100 INJECTION, POWDER, LYOPHILIZED, FOR SOLUTION INTRAVENOUS at 13:38

## 2021-01-01 RX ADMIN — BUDESONIDE 0.5 MG: 0.5 INHALANT ORAL at 07:41

## 2021-01-01 RX ADMIN — OXYCODONE HYDROCHLORIDE 5 MG: 5 TABLET ORAL at 21:07

## 2021-01-01 RX ADMIN — POTASSIUM CHLORIDE 40 MEQ: 10 CAPSULE, COATED, EXTENDED RELEASE ORAL at 08:57

## 2021-01-01 RX ADMIN — SODIUM CHLORIDE, PRESERVATIVE FREE 10 ML: 5 INJECTION INTRAVENOUS at 20:45

## 2021-01-01 RX ADMIN — DEXTROSE MONOHYDRATE 12 ML: 25 INJECTION, SOLUTION INTRAVENOUS at 22:57

## 2021-01-01 RX ADMIN — FAMOTIDINE 20 MG: 20 TABLET ORAL at 09:20

## 2021-01-01 RX ADMIN — ENOXAPARIN SODIUM 40 MG: 40 INJECTION SUBCUTANEOUS at 19:49

## 2021-01-01 RX ADMIN — FLUTICASONE PROPIONATE 2 SPRAY: 50 SPRAY, METERED NASAL at 08:00

## 2021-01-01 RX ADMIN — INSULIN HUMAN 7.5 UNITS/HR: 1 INJECTION, SOLUTION INTRAVENOUS at 00:38

## 2021-01-01 RX ADMIN — Medication 2 SPRAY: at 08:58

## 2021-01-01 RX ADMIN — SODIUM CHLORIDE, PRESERVATIVE FREE 3 ML: 5 INJECTION INTRAVENOUS at 09:23

## 2021-01-01 RX ADMIN — BUDESONIDE 0.5 MG: 0.5 INHALANT ORAL at 09:23

## 2021-01-01 RX ADMIN — FUROSEMIDE 40 MG: 10 INJECTION, SOLUTION INTRAMUSCULAR; INTRAVENOUS at 01:09

## 2021-01-01 RX ADMIN — GUAIFENESIN 600 MG: 600 TABLET, EXTENDED RELEASE ORAL at 01:59

## 2021-01-01 RX ADMIN — FAMOTIDINE 20 MG: 20 TABLET ORAL at 08:26

## 2021-01-01 RX ADMIN — BUDESONIDE 0.5 MG: 0.5 INHALANT ORAL at 19:40

## 2021-01-01 RX ADMIN — SODIUM CHLORIDE, PRESERVATIVE FREE 10 ML: 5 INJECTION INTRAVENOUS at 08:01

## 2021-01-01 RX ADMIN — IPRATROPIUM BROMIDE AND ALBUTEROL SULFATE 3 ML: .5; 2.5 SOLUTION RESPIRATORY (INHALATION) at 07:04

## 2021-01-01 RX ADMIN — DEXTROSE MONOHYDRATE 17 ML: 25 INJECTION, SOLUTION INTRAVENOUS at 19:57

## 2021-01-01 RX ADMIN — ARFORMOTEROL TARTRATE 15 MCG: 15 SOLUTION RESPIRATORY (INHALATION) at 07:41

## 2021-01-01 RX ADMIN — BUDESONIDE 0.5 MG: 0.5 INHALANT ORAL at 06:31

## 2021-01-01 RX ADMIN — IPRATROPIUM BROMIDE AND ALBUTEROL SULFATE 3 ML: .5; 2.5 SOLUTION RESPIRATORY (INHALATION) at 14:59

## 2021-01-01 RX ADMIN — DEXAMETHASONE SODIUM PHOSPHATE 10 MG: 10 INJECTION INTRAMUSCULAR; INTRAVENOUS at 20:53

## 2021-01-01 RX ADMIN — ENOXAPARIN SODIUM 40 MG: 40 INJECTION SUBCUTANEOUS at 09:00

## 2021-01-01 RX ADMIN — SODIUM CHLORIDE, PRESERVATIVE FREE 3 ML: 5 INJECTION INTRAVENOUS at 21:07

## 2021-01-01 RX ADMIN — OXYCODONE HYDROCHLORIDE 5 MG: 5 TABLET ORAL at 15:28

## 2021-01-01 RX ADMIN — BUDESONIDE 0.5 MG: 0.5 INHALANT ORAL at 06:46

## 2021-01-01 RX ADMIN — VANCOMYCIN HYDROCHLORIDE 2000 MG: 5 INJECTION, POWDER, LYOPHILIZED, FOR SOLUTION INTRAVENOUS at 18:35

## 2021-01-01 RX ADMIN — ENOXAPARIN SODIUM 40 MG: 40 INJECTION SUBCUTANEOUS at 09:22

## 2021-01-01 RX ADMIN — DOCUSATE SODIUM 50MG AND SENNOSIDES 8.6MG 2 TABLET: 8.6; 5 TABLET, FILM COATED ORAL at 21:39

## 2021-01-01 RX ADMIN — INSULIN HUMAN 4.2 UNITS/HR: 1 INJECTION, SOLUTION INTRAVENOUS at 16:07

## 2021-01-01 RX ADMIN — ARFORMOTEROL TARTRATE 15 MCG: 15 SOLUTION RESPIRATORY (INHALATION) at 06:34

## 2021-01-01 RX ADMIN — INSULIN HUMAN 5.7 UNITS/HR: 1 INJECTION, SOLUTION INTRAVENOUS at 01:15

## 2021-01-01 RX ADMIN — IPRATROPIUM BROMIDE AND ALBUTEROL SULFATE 3 ML: .5; 2.5 SOLUTION RESPIRATORY (INHALATION) at 19:10

## 2021-01-01 RX ADMIN — FUROSEMIDE 40 MG: 10 INJECTION, SOLUTION INTRAMUSCULAR; INTRAVENOUS at 03:01

## 2021-01-01 RX ADMIN — INSULIN HUMAN 2.6 UNITS/HR: 1 INJECTION, SOLUTION INTRAVENOUS at 20:19

## 2021-01-01 RX ADMIN — FUROSEMIDE 40 MG: 10 INJECTION, SOLUTION INTRAMUSCULAR; INTRAVENOUS at 14:08

## 2021-01-01 RX ADMIN — IPRATROPIUM BROMIDE AND ALBUTEROL SULFATE 3 ML: .5; 2.5 SOLUTION RESPIRATORY (INHALATION) at 19:40

## 2021-01-01 RX ADMIN — SODIUM CHLORIDE, PRESERVATIVE FREE 3 ML: 5 INJECTION INTRAVENOUS at 08:01

## 2021-01-01 RX ADMIN — FAMOTIDINE 20 MG: 20 TABLET ORAL at 08:00

## 2021-01-01 RX ADMIN — IPRATROPIUM BROMIDE AND ALBUTEROL SULFATE 3 ML: .5; 2.5 SOLUTION RESPIRATORY (INHALATION) at 00:48

## 2021-01-01 RX ADMIN — SODIUM CHLORIDE, PRESERVATIVE FREE 10 ML: 5 INJECTION INTRAVENOUS at 02:01

## 2021-01-01 RX ADMIN — SODIUM CHLORIDE, PRESERVATIVE FREE 10 ML: 5 INJECTION INTRAVENOUS at 21:03

## 2021-01-01 RX ADMIN — FUROSEMIDE 40 MG: 10 INJECTION, SOLUTION INTRAMUSCULAR; INTRAVENOUS at 01:29

## 2021-01-01 RX ADMIN — POTASSIUM CHLORIDE 10 MEQ: 7.46 INJECTION, SOLUTION INTRAVENOUS at 15:34

## 2021-01-01 RX ADMIN — IPRATROPIUM BROMIDE AND ALBUTEROL SULFATE 3 ML: .5; 2.5 SOLUTION RESPIRATORY (INHALATION) at 01:21

## 2021-01-01 RX ADMIN — SODIUM CHLORIDE 1000 ML: 9 INJECTION, SOLUTION INTRAVENOUS at 22:15

## 2021-01-01 RX ADMIN — INSULIN HUMAN 7.8 UNITS/HR: 1 INJECTION, SOLUTION INTRAVENOUS at 12:11

## 2021-01-01 RX ADMIN — IPRATROPIUM BROMIDE AND ALBUTEROL SULFATE 3 ML: .5; 2.5 SOLUTION RESPIRATORY (INHALATION) at 13:31

## 2021-01-01 RX ADMIN — IPRATROPIUM BROMIDE AND ALBUTEROL SULFATE 3 ML: .5; 2.5 SOLUTION RESPIRATORY (INHALATION) at 01:19

## 2021-01-01 RX ADMIN — IPRATROPIUM BROMIDE AND ALBUTEROL SULFATE 3 ML: .5; 2.5 SOLUTION RESPIRATORY (INHALATION) at 12:38

## 2021-01-01 RX ADMIN — SODIUM CHLORIDE, PRESERVATIVE FREE 3 ML: 5 INJECTION INTRAVENOUS at 00:23

## 2021-01-01 RX ADMIN — ARFORMOTEROL TARTRATE 15 MCG: 15 SOLUTION RESPIRATORY (INHALATION) at 18:48

## 2021-01-01 RX ADMIN — ENOXAPARIN SODIUM 40 MG: 40 INJECTION SUBCUTANEOUS at 08:30

## 2021-01-01 RX ADMIN — IPRATROPIUM BROMIDE AND ALBUTEROL SULFATE 3 ML: .5; 2.5 SOLUTION RESPIRATORY (INHALATION) at 09:23

## 2021-01-01 RX ADMIN — CEFEPIME HYDROCHLORIDE 2 G: 2 INJECTION, POWDER, FOR SOLUTION INTRAVENOUS at 17:57

## 2021-01-01 RX ADMIN — DOCUSATE SODIUM 50MG AND SENNOSIDES 8.6MG 2 TABLET: 8.6; 5 TABLET, FILM COATED ORAL at 02:00

## 2021-01-01 RX ADMIN — INSULIN HUMAN 7.2 UNITS/HR: 1 INJECTION, SOLUTION INTRAVENOUS at 14:00

## 2021-01-01 RX ADMIN — INSULIN HUMAN 10 UNITS: 100 INJECTION, SOLUTION PARENTERAL at 23:22

## 2021-01-01 RX ADMIN — CYANOCOBALAMIN 1000 MCG: 1000 INJECTION, SOLUTION INTRAMUSCULAR; SUBCUTANEOUS at 11:14

## 2021-01-01 RX ADMIN — ASPIRIN 81 MG: 81 TABLET, COATED ORAL at 09:42

## 2021-01-01 RX ADMIN — REMDESIVIR 100 MG: 100 INJECTION, POWDER, LYOPHILIZED, FOR SOLUTION INTRAVENOUS at 13:54

## 2021-01-01 RX ADMIN — DEXAMETHASONE SODIUM PHOSPHATE 6 MG: 10 INJECTION INTRAMUSCULAR; INTRAVENOUS at 09:58

## 2021-01-01 RX ADMIN — SODIUM CHLORIDE 1000 ML: 9 INJECTION, SOLUTION INTRAVENOUS at 22:35

## 2021-01-01 RX ADMIN — ENOXAPARIN SODIUM 40 MG: 40 INJECTION SUBCUTANEOUS at 08:01

## 2021-01-01 RX ADMIN — ARFORMOTEROL TARTRATE 15 MCG: 15 SOLUTION RESPIRATORY (INHALATION) at 07:26

## 2021-01-01 RX ADMIN — ASPIRIN 81 MG: 81 TABLET, COATED ORAL at 09:31

## 2021-01-01 RX ADMIN — INSULIN LISPRO 8 UNITS: 100 INJECTION, SOLUTION INTRAVENOUS; SUBCUTANEOUS at 17:36

## 2021-01-01 RX ADMIN — DEXAMETHASONE SODIUM PHOSPHATE 10 MG: 10 INJECTION INTRAMUSCULAR; INTRAVENOUS at 21:03

## 2021-01-01 RX ADMIN — SODIUM CHLORIDE, PRESERVATIVE FREE 3 ML: 5 INJECTION INTRAVENOUS at 21:08

## 2021-01-01 RX ADMIN — IPRATROPIUM BROMIDE AND ALBUTEROL SULFATE 3 ML: .5; 2.5 SOLUTION RESPIRATORY (INHALATION) at 13:07

## 2021-01-01 RX ADMIN — DEXAMETHASONE SODIUM PHOSPHATE 10 MG: 10 INJECTION INTRAMUSCULAR; INTRAVENOUS at 08:58

## 2021-01-01 RX ADMIN — BUDESONIDE 0.5 MG: 0.5 INHALANT ORAL at 19:15

## 2021-01-01 RX ADMIN — SODIUM CHLORIDE, PRESERVATIVE FREE 3 ML: 5 INJECTION INTRAVENOUS at 20:54

## 2021-01-01 RX ADMIN — ASPIRIN 81 MG: 81 TABLET, COATED ORAL at 08:21

## 2021-01-01 RX ADMIN — Medication 2 SPRAY: at 21:08

## 2021-01-01 RX ADMIN — ARFORMOTEROL TARTRATE 15 MCG: 15 SOLUTION RESPIRATORY (INHALATION) at 19:38

## 2021-01-01 RX ADMIN — ASPIRIN 81 MG: 81 TABLET, COATED ORAL at 10:41

## 2021-01-01 RX ADMIN — IPRATROPIUM BROMIDE AND ALBUTEROL SULFATE 3 ML: .5; 2.5 SOLUTION RESPIRATORY (INHALATION) at 00:50

## 2021-01-01 RX ADMIN — SODIUM CHLORIDE, PRESERVATIVE FREE 10 ML: 5 INJECTION INTRAVENOUS at 20:54

## 2021-01-01 RX ADMIN — FAMOTIDINE 20 MG: 20 TABLET ORAL at 17:56

## 2021-01-01 RX ADMIN — FAMOTIDINE 20 MG: 20 TABLET ORAL at 09:31

## 2021-01-01 RX ADMIN — ACETAMINOPHEN 1000 MG: 500 TABLET ORAL at 05:08

## 2021-01-01 RX ADMIN — POTASSIUM CHLORIDE 40 MEQ: 10 CAPSULE, COATED, EXTENDED RELEASE ORAL at 01:59

## 2021-01-01 RX ADMIN — IPRATROPIUM BROMIDE AND ALBUTEROL SULFATE 3 ML: .5; 2.5 SOLUTION RESPIRATORY (INHALATION) at 06:31

## 2021-01-01 RX ADMIN — INSULIN HUMAN 6.3 UNITS/HR: 1 INJECTION, SOLUTION INTRAVENOUS at 03:07

## 2021-01-01 RX ADMIN — IPRATROPIUM BROMIDE AND ALBUTEROL SULFATE 3 ML: .5; 2.5 SOLUTION RESPIRATORY (INHALATION) at 18:48

## 2021-01-01 RX ADMIN — IPRATROPIUM BROMIDE AND ALBUTEROL SULFATE 3 ML: .5; 2.5 SOLUTION RESPIRATORY (INHALATION) at 00:23

## 2021-01-01 RX ADMIN — CEFTRIAXONE 1 G: 10 INJECTION, POWDER, FOR SOLUTION INTRAVENOUS at 09:59

## 2021-01-01 RX ADMIN — IPRATROPIUM BROMIDE AND ALBUTEROL SULFATE 3 ML: .5; 2.5 SOLUTION RESPIRATORY (INHALATION) at 02:00

## 2021-01-01 RX ADMIN — IPRATROPIUM BROMIDE AND ALBUTEROL SULFATE 3 ML: .5; 2.5 SOLUTION RESPIRATORY (INHALATION) at 20:48

## 2021-01-01 RX ADMIN — INSULIN HUMAN 15 UNITS: 100 INJECTION, SOLUTION PARENTERAL at 22:14

## 2021-01-01 RX ADMIN — IPRATROPIUM BROMIDE AND ALBUTEROL SULFATE 3 ML: .5; 2.5 SOLUTION RESPIRATORY (INHALATION) at 06:46

## 2021-01-01 RX ADMIN — BUDESONIDE 0.5 MG: 0.5 INHALANT ORAL at 07:18

## 2021-01-01 RX ADMIN — ARFORMOTEROL TARTRATE 15 MCG: 15 SOLUTION RESPIRATORY (INHALATION) at 19:42

## 2021-01-01 RX ADMIN — REMDESIVIR 200 MG: 100 INJECTION, POWDER, LYOPHILIZED, FOR SOLUTION INTRAVENOUS at 08:38

## 2021-01-01 RX ADMIN — DOCUSATE SODIUM 50MG AND SENNOSIDES 8.6MG 2 TABLET: 8.6; 5 TABLET, FILM COATED ORAL at 08:55

## 2021-01-01 RX ADMIN — ENOXAPARIN SODIUM 40 MG: 40 INJECTION SUBCUTANEOUS at 09:45

## 2021-01-01 RX ADMIN — ENOXAPARIN SODIUM 40 MG: 40 INJECTION SUBCUTANEOUS at 08:38

## 2021-01-01 RX ADMIN — FAMOTIDINE 20 MG: 20 TABLET ORAL at 17:08

## 2021-01-01 RX ADMIN — INSULIN HUMAN 8.5 UNITS/HR: 1 INJECTION, SOLUTION INTRAVENOUS at 10:56

## 2021-01-01 RX ADMIN — FLUTICASONE PROPIONATE 2 SPRAY: 50 SPRAY, METERED NASAL at 09:21

## 2021-01-01 RX ADMIN — Medication 2 SPRAY: at 22:42

## 2021-01-01 RX ADMIN — ARFORMOTEROL TARTRATE 15 MCG: 15 SOLUTION RESPIRATORY (INHALATION) at 06:46

## 2021-01-01 RX ADMIN — ENOXAPARIN SODIUM 40 MG: 40 INJECTION SUBCUTANEOUS at 20:53

## 2021-01-01 RX ADMIN — DEXTROSE MONOHYDRATE 25 ML: 25 INJECTION, SOLUTION INTRAVENOUS at 21:29

## 2021-01-01 RX ADMIN — Medication 2 SPRAY: at 20:54

## 2021-01-01 RX ADMIN — SODIUM CHLORIDE, PRESERVATIVE FREE 3 ML: 5 INJECTION INTRAVENOUS at 19:48

## 2021-01-01 RX ADMIN — MORPHINE SULFATE 5 MG: 10 SOLUTION ORAL at 12:42

## 2021-01-01 RX ADMIN — REMDESIVIR 100 MG: 100 INJECTION, POWDER, LYOPHILIZED, FOR SOLUTION INTRAVENOUS at 13:10

## 2021-01-01 RX ADMIN — BUDESONIDE 0.5 MG: 0.5 INHALANT ORAL at 19:09

## 2021-01-01 RX ADMIN — FUROSEMIDE 40 MG: 10 INJECTION, SOLUTION INTRAMUSCULAR; INTRAVENOUS at 14:50

## 2021-01-01 RX ADMIN — Medication 5 MG: at 22:21

## 2021-01-01 RX ADMIN — ARFORMOTEROL TARTRATE 15 MCG: 15 SOLUTION RESPIRATORY (INHALATION) at 07:04

## 2021-01-01 RX ADMIN — DEXAMETHASONE SODIUM PHOSPHATE 10 MG: 10 INJECTION INTRAMUSCULAR; INTRAVENOUS at 08:26

## 2021-01-01 RX ADMIN — ONDANSETRON 4 MG: 2 INJECTION INTRAMUSCULAR; INTRAVENOUS at 12:21

## 2021-01-01 RX ADMIN — CEFTRIAXONE 1 G: 10 INJECTION, POWDER, FOR SOLUTION INTRAVENOUS at 10:24

## 2021-01-01 RX ADMIN — DEXAMETHASONE SODIUM PHOSPHATE 10 MG: 10 INJECTION INTRAMUSCULAR; INTRAVENOUS at 09:29

## 2021-01-01 RX ADMIN — ARFORMOTEROL TARTRATE 15 MCG: 15 SOLUTION RESPIRATORY (INHALATION) at 07:00

## 2021-01-01 RX ADMIN — IPRATROPIUM BROMIDE AND ALBUTEROL SULFATE 3 ML: .5; 2.5 SOLUTION RESPIRATORY (INHALATION) at 14:27

## 2021-01-01 RX ADMIN — IPRATROPIUM BROMIDE AND ALBUTEROL SULFATE 3 ML: .5; 2.5 SOLUTION RESPIRATORY (INHALATION) at 18:51

## 2021-01-01 RX ADMIN — ALPRAZOLAM 1 MG: 0.25 TABLET ORAL at 08:42

## 2021-01-01 RX ADMIN — SODIUM CHLORIDE 2 G: 9 INJECTION INTRAMUSCULAR; INTRAVENOUS; SUBCUTANEOUS at 20:44

## 2021-01-01 RX ADMIN — FUROSEMIDE 40 MG: 10 INJECTION, SOLUTION INTRAMUSCULAR; INTRAVENOUS at 13:42

## 2021-01-01 RX ADMIN — ALPRAZOLAM 1 MG: 0.25 TABLET ORAL at 04:03

## 2021-01-01 RX ADMIN — DOCUSATE SODIUM 50MG AND SENNOSIDES 8.6MG 2 TABLET: 8.6; 5 TABLET, FILM COATED ORAL at 09:20

## 2021-01-01 RX ADMIN — DEXAMETHASONE SODIUM PHOSPHATE 10 MG: 10 INJECTION INTRAMUSCULAR; INTRAVENOUS at 09:20

## 2021-01-01 RX ADMIN — SODIUM CHLORIDE, PRESERVATIVE FREE 10 ML: 5 INJECTION INTRAVENOUS at 20:28

## 2021-01-01 RX ADMIN — INSULIN HUMAN 16.6 UNITS/HR: 1 INJECTION, SOLUTION INTRAVENOUS at 22:12

## 2021-01-01 RX ADMIN — DEXAMETHASONE SODIUM PHOSPHATE 10 MG: 10 INJECTION INTRAMUSCULAR; INTRAVENOUS at 08:29

## 2021-01-01 RX ADMIN — SODIUM CHLORIDE, PRESERVATIVE FREE 10 ML: 5 INJECTION INTRAVENOUS at 22:16

## 2021-01-01 RX ADMIN — SODIUM CHLORIDE, PRESERVATIVE FREE 3 ML: 5 INJECTION INTRAVENOUS at 09:29

## 2021-01-01 RX ADMIN — IPRATROPIUM BROMIDE AND ALBUTEROL SULFATE 3 ML: .5; 2.5 SOLUTION RESPIRATORY (INHALATION) at 07:41

## 2021-01-01 RX ADMIN — CYANOCOBALAMIN 1000 MCG: 1000 INJECTION, SOLUTION INTRAMUSCULAR; SUBCUTANEOUS at 11:15

## 2021-01-01 RX ADMIN — ASPIRIN 81 MG: 81 TABLET, COATED ORAL at 09:20

## 2021-01-01 RX ADMIN — BUDESONIDE 0.5 MG: 0.5 INHALANT ORAL at 07:00

## 2021-01-01 RX ADMIN — IPRATROPIUM BROMIDE AND ALBUTEROL SULFATE 3 ML: .5; 2.5 SOLUTION RESPIRATORY (INHALATION) at 12:17

## 2021-01-01 RX ADMIN — IPRATROPIUM BROMIDE AND ALBUTEROL SULFATE 3 ML: .5; 2.5 SOLUTION RESPIRATORY (INHALATION) at 18:34

## 2021-01-01 RX ADMIN — IPRATROPIUM BROMIDE AND ALBUTEROL SULFATE 3 ML: .5; 2.5 SOLUTION RESPIRATORY (INHALATION) at 07:18

## 2021-01-01 RX ADMIN — SODIUM CHLORIDE 125 ML/HR: 9 INJECTION, SOLUTION INTRAVENOUS at 00:24

## 2021-01-01 RX ADMIN — SODIUM CHLORIDE, PRESERVATIVE FREE 3 ML: 5 INJECTION INTRAVENOUS at 08:41

## 2021-01-01 RX ADMIN — INSULIN DETEMIR 10 UNITS: 100 INJECTION, SOLUTION SUBCUTANEOUS at 21:38

## 2021-01-01 RX ADMIN — SODIUM CHLORIDE, PRESERVATIVE FREE 3 ML: 5 INJECTION INTRAVENOUS at 09:00

## 2021-01-01 RX ADMIN — FUROSEMIDE 40 MG: 10 INJECTION, SOLUTION INTRAMUSCULAR; INTRAVENOUS at 01:48

## 2021-01-01 RX ADMIN — FUROSEMIDE 40 MG: 10 INJECTION, SOLUTION INTRAMUSCULAR; INTRAVENOUS at 01:11

## 2021-01-01 RX ADMIN — ARFORMOTEROL TARTRATE 15 MCG: 15 SOLUTION RESPIRATORY (INHALATION) at 19:56

## 2021-01-01 RX ADMIN — BUDESONIDE 0.5 MG: 0.5 INHALANT ORAL at 06:34

## 2021-01-01 RX ADMIN — FUROSEMIDE 40 MG: 10 INJECTION, SOLUTION INTRAMUSCULAR; INTRAVENOUS at 03:31

## 2021-01-01 RX ADMIN — SODIUM CHLORIDE, PRESERVATIVE FREE 3 ML: 5 INJECTION INTRAVENOUS at 20:46

## 2021-01-01 RX ADMIN — AZITHROMYCIN 500 MG: 500 INJECTION, POWDER, LYOPHILIZED, FOR SOLUTION INTRAVENOUS at 10:24

## 2021-01-01 RX ADMIN — BUDESONIDE 0.5 MG: 0.5 INHALANT ORAL at 18:48

## 2021-01-01 RX ADMIN — ALPRAZOLAM 1 MG: 0.25 TABLET ORAL at 08:57

## 2021-01-01 RX ADMIN — FAMOTIDINE 20 MG: 20 TABLET ORAL at 18:24

## 2021-01-01 RX ADMIN — INSULIN HUMAN 22.8 UNITS/HR: 1 INJECTION, SOLUTION INTRAVENOUS at 21:14

## 2021-01-01 RX ADMIN — SODIUM CHLORIDE 1000 ML: 9 INJECTION, SOLUTION INTRAVENOUS at 23:27

## 2021-01-01 RX ADMIN — SODIUM CHLORIDE, PRESERVATIVE FREE 3 ML: 5 INJECTION INTRAVENOUS at 21:04

## 2021-01-01 RX ADMIN — FUROSEMIDE 40 MG: 10 INJECTION, SOLUTION INTRAMUSCULAR; INTRAVENOUS at 13:36

## 2021-01-01 RX ADMIN — IPRATROPIUM BROMIDE AND ALBUTEROL SULFATE 3 ML: .5; 2.5 SOLUTION RESPIRATORY (INHALATION) at 14:15

## 2021-01-01 RX ADMIN — FAMOTIDINE 20 MG: 20 TABLET ORAL at 08:30

## 2021-01-01 RX ADMIN — POTASSIUM CHLORIDE 10 MEQ: 7.46 INJECTION, SOLUTION INTRAVENOUS at 16:42

## 2021-01-01 RX ADMIN — MORPHINE SULFATE 2 MG: 2 INJECTION, SOLUTION INTRAMUSCULAR; INTRAVENOUS at 11:56

## 2021-01-01 RX ADMIN — FLUTICASONE PROPIONATE 2 SPRAY: 50 SPRAY, METERED NASAL at 08:58

## 2021-01-01 RX ADMIN — Medication 2 SPRAY: at 09:23

## 2021-01-01 RX ADMIN — IPRATROPIUM BROMIDE AND ALBUTEROL SULFATE 3 ML: .5; 2.5 SOLUTION RESPIRATORY (INHALATION) at 19:15

## 2021-01-01 RX ADMIN — ASPIRIN 81 MG: 81 TABLET, COATED ORAL at 08:54

## 2021-01-01 RX ADMIN — ALPRAZOLAM 1 MG: 0.25 TABLET ORAL at 21:07

## 2021-01-01 RX ADMIN — BUDESONIDE 0.5 MG: 0.5 INHALANT ORAL at 18:34

## 2021-01-01 RX ADMIN — POTASSIUM CHLORIDE, DEXTROSE MONOHYDRATE AND SODIUM CHLORIDE 150 ML/HR: 150; 5; 900 INJECTION, SOLUTION INTRAVENOUS at 07:33

## 2021-01-01 RX ADMIN — IPRATROPIUM BROMIDE AND ALBUTEROL SULFATE 3 ML: .5; 2.5 SOLUTION RESPIRATORY (INHALATION) at 07:00

## 2021-01-01 RX ADMIN — DEXAMETHASONE SODIUM PHOSPHATE 6 MG: 10 INJECTION INTRAMUSCULAR; INTRAVENOUS at 08:54

## 2021-01-01 RX ADMIN — ALPRAZOLAM 1 MG: 0.25 TABLET ORAL at 15:28

## 2021-01-01 RX ADMIN — FLUCONAZOLE IN SODIUM CHLORIDE 200 MG: 2 INJECTION, SOLUTION INTRAVENOUS at 13:37

## 2021-01-01 RX ADMIN — ASPIRIN 81 MG: 81 TABLET, COATED ORAL at 08:57

## 2021-01-01 RX ADMIN — ARFORMOTEROL TARTRATE 15 MCG: 15 SOLUTION RESPIRATORY (INHALATION) at 09:23

## 2021-01-01 RX ADMIN — DEXTROSE MONOHYDRATE 18 ML: 25 INJECTION, SOLUTION INTRAVENOUS at 19:47

## 2021-01-01 RX ADMIN — ONDANSETRON 4 MG: 2 INJECTION INTRAMUSCULAR; INTRAVENOUS at 19:42

## 2021-01-01 RX ADMIN — SODIUM CHLORIDE, PRESERVATIVE FREE 10 ML: 5 INJECTION INTRAVENOUS at 19:45

## 2021-01-01 RX ADMIN — AZITHROMYCIN 500 MG: 500 INJECTION, POWDER, LYOPHILIZED, FOR SOLUTION INTRAVENOUS at 10:41

## 2021-01-01 RX ADMIN — INSULIN DETEMIR 20 UNITS: 100 INJECTION, SOLUTION SUBCUTANEOUS at 10:56

## 2021-01-01 RX ADMIN — SODIUM CHLORIDE, PRESERVATIVE FREE 10 ML: 5 INJECTION INTRAVENOUS at 08:30

## 2021-01-01 RX ADMIN — FAMOTIDINE 20 MG: 20 TABLET ORAL at 17:06

## 2021-01-01 RX ADMIN — SODIUM CHLORIDE, PRESERVATIVE FREE 10 ML: 5 INJECTION INTRAVENOUS at 09:21

## 2021-01-01 RX ADMIN — SODIUM CHLORIDE, PRESERVATIVE FREE 10 ML: 5 INJECTION INTRAVENOUS at 21:08

## 2021-01-01 RX ADMIN — SODIUM CHLORIDE, PRESERVATIVE FREE 10 ML: 5 INJECTION INTRAVENOUS at 09:00

## 2021-01-01 RX ADMIN — ARFORMOTEROL TARTRATE 15 MCG: 15 SOLUTION RESPIRATORY (INHALATION) at 18:51

## 2021-01-01 RX ADMIN — FUROSEMIDE 80 MG: 10 INJECTION, SOLUTION INTRAVENOUS at 14:48

## 2021-01-01 RX ADMIN — BUDESONIDE 0.5 MG: 0.5 INHALANT ORAL at 19:42

## 2021-01-01 RX ADMIN — SODIUM CHLORIDE 1000 ML: 9 INJECTION, SOLUTION INTRAVENOUS at 20:44

## 2021-01-01 RX ADMIN — DEXAMETHASONE SODIUM PHOSPHATE 10 MG: 10 INJECTION INTRAMUSCULAR; INTRAVENOUS at 19:50

## 2021-01-01 RX ADMIN — IPRATROPIUM BROMIDE AND ALBUTEROL SULFATE 3 ML: .5; 2.5 SOLUTION RESPIRATORY (INHALATION) at 06:32

## 2021-01-01 RX ADMIN — CEFEPIME HYDROCHLORIDE 2 G: 2 INJECTION, POWDER, FOR SOLUTION INTRAVENOUS at 01:29

## 2021-01-01 RX ADMIN — FAMOTIDINE 20 MG: 20 TABLET ORAL at 09:42

## 2021-01-01 RX ADMIN — ACETAMINOPHEN 975 MG: 325 TABLET ORAL at 20:42

## 2021-01-01 RX ADMIN — ENOXAPARIN SODIUM 40 MG: 40 INJECTION SUBCUTANEOUS at 20:19

## 2021-01-01 RX ADMIN — DEXTROSE AND SODIUM CHLORIDE 150 ML/HR: 5; 900 INJECTION, SOLUTION INTRAVENOUS at 04:59

## 2021-01-01 RX ADMIN — DEXAMETHASONE SODIUM PHOSPHATE 10 MG: 10 INJECTION INTRAMUSCULAR; INTRAVENOUS at 09:43

## 2021-01-01 RX ADMIN — INSULIN HUMAN 17.3 UNITS/HR: 1 INJECTION, SOLUTION INTRAVENOUS at 12:34

## 2021-01-01 RX ADMIN — IPRATROPIUM BROMIDE AND ALBUTEROL SULFATE 3 ML: .5; 2.5 SOLUTION RESPIRATORY (INHALATION) at 07:26

## 2021-01-01 RX ADMIN — BUDESONIDE 0.5 MG: 0.5 INHALANT ORAL at 07:04

## 2021-01-01 RX ADMIN — SALINE NASAL SPRAY 2 SPRAY: 1.5 SOLUTION NASAL at 08:58

## 2021-01-01 RX ADMIN — ALPRAZOLAM 1 MG: 0.25 TABLET ORAL at 11:28

## 2021-01-01 RX ADMIN — OXYCODONE HYDROCHLORIDE 5 MG: 5 TABLET ORAL at 19:50

## 2021-01-01 RX ADMIN — ASPIRIN 81 MG: 81 TABLET, COATED ORAL at 08:00

## 2021-01-01 RX ADMIN — REMDESIVIR 100 MG: 100 INJECTION, POWDER, LYOPHILIZED, FOR SOLUTION INTRAVENOUS at 13:12

## 2021-01-01 RX ADMIN — INSULIN HUMAN 7.3 UNITS/HR: 1 INJECTION, SOLUTION INTRAVENOUS at 18:14

## 2021-01-01 RX ADMIN — BUDESONIDE 0.5 MG: 0.5 INHALANT ORAL at 18:51

## 2021-01-01 RX ADMIN — SODIUM CHLORIDE, PRESERVATIVE FREE 10 ML: 5 INJECTION INTRAVENOUS at 08:41

## 2021-01-01 RX ADMIN — VANCOMYCIN HYDROCHLORIDE 1250 MG: 5 INJECTION, POWDER, LYOPHILIZED, FOR SOLUTION INTRAVENOUS at 08:00

## 2021-01-01 RX ADMIN — ENOXAPARIN SODIUM 40 MG: 40 INJECTION SUBCUTANEOUS at 08:54

## 2021-01-01 RX ADMIN — FUROSEMIDE 40 MG: 10 INJECTION, SOLUTION INTRAMUSCULAR; INTRAVENOUS at 01:14

## 2021-01-01 RX ADMIN — ARFORMOTEROL TARTRATE 15 MCG: 15 SOLUTION RESPIRATORY (INHALATION) at 06:31

## 2021-01-01 RX ADMIN — IPRATROPIUM BROMIDE AND ALBUTEROL SULFATE 3 ML: .5; 2.5 SOLUTION RESPIRATORY (INHALATION) at 06:34

## 2021-01-01 RX ADMIN — POTASSIUM CHLORIDE 10 MEQ: 7.46 INJECTION, SOLUTION INTRAVENOUS at 15:20

## 2021-01-01 RX ADMIN — ALPRAZOLAM 1 MG: 0.25 TABLET ORAL at 20:30

## 2021-01-01 RX ADMIN — ENOXAPARIN SODIUM 40 MG: 40 INJECTION SUBCUTANEOUS at 20:45

## 2021-01-01 RX ADMIN — FUROSEMIDE 40 MG: 10 INJECTION, SOLUTION INTRAMUSCULAR; INTRAVENOUS at 15:25

## 2021-01-01 RX ADMIN — FUROSEMIDE 40 MG: 10 INJECTION, SOLUTION INTRAMUSCULAR; INTRAVENOUS at 15:14

## 2021-01-01 RX ADMIN — IPRATROPIUM BROMIDE AND ALBUTEROL SULFATE 3 ML: .5; 2.5 SOLUTION RESPIRATORY (INHALATION) at 00:29

## 2021-01-01 RX ADMIN — IPRATROPIUM BROMIDE AND ALBUTEROL SULFATE 3 ML: .5; 2.5 SOLUTION RESPIRATORY (INHALATION) at 19:43

## 2021-01-01 RX ADMIN — DEXTROSE MONOHYDRATE 15 ML: 25 INJECTION, SOLUTION INTRAVENOUS at 19:35

## 2021-01-01 RX ADMIN — IPRATROPIUM BROMIDE AND ALBUTEROL SULFATE 3 ML: .5; 2.5 SOLUTION RESPIRATORY (INHALATION) at 00:03

## 2021-01-01 RX ADMIN — IPRATROPIUM BROMIDE AND ALBUTEROL SULFATE 3 ML: .5; 2.5 SOLUTION RESPIRATORY (INHALATION) at 12:14

## 2021-01-01 RX ADMIN — SODIUM CHLORIDE, PRESERVATIVE FREE 10 ML: 5 INJECTION INTRAVENOUS at 10:27

## 2021-01-01 RX ADMIN — ENOXAPARIN SODIUM 40 MG: 40 INJECTION SUBCUTANEOUS at 09:32

## 2021-01-01 RX ADMIN — SALINE NASAL SPRAY 2 SPRAY: 1.5 SOLUTION NASAL at 22:40

## 2021-01-01 RX ADMIN — GUAIFENESIN 600 MG: 600 TABLET, EXTENDED RELEASE ORAL at 08:55

## 2021-01-01 RX ADMIN — ENOXAPARIN SODIUM 40 MG: 40 INJECTION SUBCUTANEOUS at 20:28

## 2021-01-01 RX ADMIN — POTASSIUM CHLORIDE 40 MEQ: 10 CAPSULE, COATED, EXTENDED RELEASE ORAL at 05:09

## 2021-01-01 RX ADMIN — TOCILIZUMAB 800 MG: 20 INJECTION, SOLUTION, CONCENTRATE INTRAVENOUS at 15:15

## 2021-01-01 RX ADMIN — DEXAMETHASONE SODIUM PHOSPHATE 10 MG: 10 INJECTION INTRAMUSCULAR; INTRAVENOUS at 08:00

## 2021-01-01 RX ADMIN — POTASSIUM CHLORIDE 30 MEQ: 10 CAPSULE, COATED, EXTENDED RELEASE ORAL at 08:55

## 2021-01-01 RX ADMIN — ALPRAZOLAM 1 MG: 0.25 TABLET ORAL at 09:20

## 2021-01-01 RX ADMIN — BUDESONIDE 0.5 MG: 0.5 INHALANT ORAL at 07:26

## 2021-01-01 RX ADMIN — ARFORMOTEROL TARTRATE 15 MCG: 15 SOLUTION RESPIRATORY (INHALATION) at 07:18

## 2021-01-01 RX ADMIN — SODIUM CHLORIDE, PRESERVATIVE FREE 3 ML: 5 INJECTION INTRAVENOUS at 08:32

## 2021-01-01 RX ADMIN — POTASSIUM CHLORIDE 40 MEQ: 10 CAPSULE, COATED, EXTENDED RELEASE ORAL at 08:30

## 2021-01-01 RX ADMIN — DEXAMETHASONE SODIUM PHOSPHATE 10 MG: 10 INJECTION INTRAMUSCULAR; INTRAVENOUS at 20:28

## 2021-01-01 RX ADMIN — SODIUM CHLORIDE, PRESERVATIVE FREE 3 ML: 5 INJECTION INTRAVENOUS at 21:03

## 2021-01-01 RX ADMIN — ENOXAPARIN SODIUM 40 MG: 40 INJECTION SUBCUTANEOUS at 21:07

## 2021-01-01 RX ADMIN — INSULIN HUMAN 5.6 UNITS/HR: 1 INJECTION, SOLUTION INTRAVENOUS at 01:10

## 2021-01-01 RX ADMIN — IPRATROPIUM BROMIDE AND ALBUTEROL SULFATE 3 ML: .5; 2.5 SOLUTION RESPIRATORY (INHALATION) at 19:56

## 2021-01-01 RX ADMIN — ARFORMOTEROL TARTRATE 15 MCG: 15 SOLUTION RESPIRATORY (INHALATION) at 19:15

## 2021-01-01 RX ADMIN — BUDESONIDE 0.5 MG: 0.5 INHALANT ORAL at 06:32

## 2021-01-01 RX ADMIN — FUROSEMIDE 40 MG: 10 INJECTION, SOLUTION INTRAMUSCULAR; INTRAVENOUS at 15:28

## 2021-01-01 RX ADMIN — INSULIN LISPRO 6 UNITS: 100 INJECTION, SOLUTION INTRAVENOUS; SUBCUTANEOUS at 14:07

## 2021-01-01 RX ADMIN — BUDESONIDE 0.5 MG: 0.5 INHALANT ORAL at 19:56

## 2021-01-01 RX ADMIN — OXYCODONE HYDROCHLORIDE 5 MG: 5 TABLET ORAL at 08:57

## 2021-01-01 RX ADMIN — FUROSEMIDE 40 MG: 10 INJECTION, SOLUTION INTRAMUSCULAR; INTRAVENOUS at 15:59

## 2021-01-01 RX ADMIN — TOCILIZUMAB 800 MG: 20 INJECTION, SOLUTION, CONCENTRATE INTRAVENOUS at 08:23

## 2021-01-01 RX ADMIN — SODIUM CHLORIDE, PRESERVATIVE FREE 3 ML: 5 INJECTION INTRAVENOUS at 20:29

## 2021-01-01 RX ADMIN — ASPIRIN 81 MG: 81 TABLET, COATED ORAL at 08:30

## 2021-01-01 ASSESSMENT — KOOS JR
KOOS JR SCORE: 54.84
KOOS JR SCORE: 13

## 2021-05-10 NOTE — OUTREACH NOTE
"   Quick Note      Patient Name: Aleksandar Marks   Patient ID: [id number]   Sex: Male   YOB: 1945    Primary Care Provider: Demar DUARTE   Referring Provider: Demar DUARTE    Visit Date: December 31, 2020    Provider: GREALD Chavez   Location: [facility]   Location Address: [address] [place] [address], [address]  [id number]   Location Phone: [phone]          History Of Present Illness  CCM Comprehensive Care Plan    This Chronic Medical Management Care Plan for Aleksandar Marks, 75 year old /White male, has been monitored and managed for the month of December. A cumulative time of 21 minutes was spent on this patient record, including electronic communication via patient portal and chart review.   Regarding the patient's diagnoses Diabetes, Shortness of Breath, Sinus trouble, and Skin Disease, the following items were adressed: medical records, medications, and changes to medical care and any changes can be found within the plan section of the note. A detailed listing of time spent for chronic care management has been scanned into the patient's electronic record. Current medications include: Accu-Chek Irina Control Soln miscellaneous solution, Accu-Chek Irina Plus Meter miscellaneous misc, Accu-Chek Irina Plus test strp miscellaneous strip, Accu-Chek Softclix Lancets miscellaneous misc, albuterol sulfate 90 mcg/actuation inhalation HFA aerosol inhaler, alcohol swabs topical pads, medicated, aspirin 81 mg oral tablet,delayed release (DR/EC), atenolol 25 mg oral tablet, cetirizine 10 mg oral tablet, cyanocobalamin (vitamin B-12) 1,000 mcg/mL injection solution, fluticasone propionate 50 mcg/actuation nasal spray,suspension, hydrochlorothiazide 25 mg oral tablet, lisinopril 40 mg oral tablet, Novolin R Regular U-100 Insuln 100 unit/mL injection solution, pen needle, diabetic 31 gauge x 1/4\" miscellaneous needle, and simvastatin 20 mg oral tablet " and the patient is reported to be non-compliant and pt has been provided education on compliance and DM. Medications are reported to be effective.   The patient was monitored remotely for blood pressure, weight, activity level, and blood glucose for a period of 21 minutes.   This patient's physical needs include: physical healthcare. pt needs monitoring of weight, BP, sugar.   Patient's mental support needs are currently being met.   The patient's cognitive support needs are currently being met.   The patient's psychosocial support needs are N/A,   This patient's functional needs include: DME supplies. pt started on insulin pump.   This patient's environmental needs are N/A.           Assessment  · Chronic Care Management (CCM)     V68.89/Z02.89  · Diabetes     250.92  · Shortness of Breath     786.05/R06.02  · Overweight     278.02/E66.3  · Hypertension     401.9/I10      Plan  · Medications  o Medications have been Reconciled  o Transition of Care or Provider Policy  · Instructions  o Patient's Health Care Goals: lower A1C  o Provider's Health Care Goals: A1C <8  o Patient was provided an electronic copy of care plan  o CCM services were explained and offered and patient has accepted these services.  o Patient has given their written consent to recieve CCM services and understands that this includes the authorization of electronic communication of medical information with other treating providers.  o Patient understands that they may stop CCM services at any time and these changes will be effective at the end of the calendar month and will effectively revocate the agreement of CCM services.  o Patient understands that only one practioner can furnish and be paid for CCM services during one calendar month. Patient also understands that there may be co-payment or deductible fees in association with CCM services.  o Patient will continue with at least monthly follow-up calls with the Nurse Navigator.  · Associate  Tasks  o Task ID [id number] CCM: [facility] DEC 2020            Electronically Signed by: Mavis Miranda RN -Author on December 31, 2020 12:10:01 PM

## 2021-05-10 NOTE — OUTREACH NOTE
Quick Note      Patient Name: Aleksandar Marks   Patient ID: [id number]   Sex: Male   YOB: 1945    Primary Care Provider: Kun DUARTE   Referring Provider: Kun DUARTE    Visit Date: July 28, 2020    Provider: GERALD Delatorre   Location: [facility]   Location Address: [address] [place] [address], [address]  [id number]   Location Phone: [phone]          History Of Present Illness     [facility]     TaskID: [id number]    Task Subject: [facility] July 2020    Task Comments:    Date: Jul 22 2020 10:13AM     Creator: ana luisa  I consulted Kun regarding pt's blood sugar log he sent me since he stopped his even insulin with dinner (see chart for log.) Sugars are worse and higer. AM sugars in 200-300 and all evening sugars in 390s. Kun agreed pt needs to discuss with Brian. I suggested making adjustments now. Pt told me he was taking 30 units Novolog with lunch, Kun wants to increase to 34. Pt stopped taking any of the 35-40 units with dinner, Kun ordered to start back 25 units with dinner. All of this depends on if pt is eating a larger meal. I emailed pt with these orders, asking him to email me back, verifying he understands. 10 min    Date: Jul 22 2020  9:52AM     Creator: ana luisa  cont.. heard from [facility] office. Pt was ordered to start HCTX 25 mg daily. Given a paper script. I told nurse that pt was not aware of this. She agreed to send in script electronically. I then called diabetes management services at [facility] and scheduled pt an appt for 7/29 2pm with Brian, mentioning the finance issue. I called pt with this info and emailed him the appt. He stated he was at the pharmacy now. i told him to give the rx a few min to go through. PCP made aware of all of this. 35 min    Date: Jul 22 2020  9:50AM     Creator: ana luisa gonzalez... sugars. A few motnhs ago, he still had poor control over blood sugar, but it was not routinely in upper 300s-400s. I mentioned that since his pancreas is no  "longer making insuling, per Brian, that he has to have that much better control over his diet. Pt takes Toujeo 120 units a day, and then his supplemental insulin TID with meals Novolog. He takes about 12 units in AM, 30 at lunch, and 35-40 in evening. However, the last week he has not taken any evening insulin. He said he was bottoming into the 40-60s in the AM so instead of contacting me, lowering the dose, he just cut out the evening dose of insulin. I told PCP. Pt is to email me list of sugars since he made this change. Pt open to an insulin pump but said it is over $200 a month and cannot afford it. He does not know when he sees Brian next. I will check and also make sure she knows his financial issue. Pt said he \"eats less than I used to\" but clearly eating less, he is not eating the right foods. Pt to see PCP in one month. AFter appt, i    Date: Jul 22 2020  9:43AM     Creator: ana luisa gonzalez.. that \"didnt work\" for him. We had a lengthy discussion about diet. He stated he has tried \"no cards\", counting carbs, and thinks there is something more going on then just diet causing his sugars to be high. However, after asking about his weekly diet choices, he admitted to eating out 3+ times a week, getting meals with fried meets, bread, high carb sides, and drinking a Pepsi everyday. He also admitted to eating Ice cream. I suggested he mix in or substitute sweet treats with low carb/sugar alternatives. He said that he cannot eat those (nasty.) I suggested, again, portion control. I agreed that it is almost impossible to cut a food group (sugar/carbs) out of your diet all at once. But, if small adjustments are made weekly/daily, it is much more doable. We have had this discussion many times about diet. Pt seems uninterested. He brought up that Brian told him he is no longer a type 2 diabetic, but a type 1. I agreed because i read this in her notes. This led me to into the next topic- his spiking    Date: Jul 22 2020  " "9:24AM     Creator: ana luisa  7/21/2020: Pt presented to office for acute visit concerning leg swelling. He saw Dr Dominique earlier today for a new pt visit, regarding dizziness and swelling. Note not in [facility] because cardio office is still doing paper charting. Pt told Kun that cardio was ordering an ECHO and stress test. Pt also to be started on a water pill, pt unsure what medicine. Kun wants to start pt on a diuretic if cardio did not do so. [facility] to ask about diure[facility]. Then i went and spoke to pt. He stated he was doing okay overall. No dizziness. His appt with cardio went okay, didnt \"say much.\" He was unsure about the water pill. He has some +1 mild pitting edema in right ankle and lower leg, mild non pitting edema in left. No redness or shininess of skin. He said his BP when he first got to cardio was 162/80 but shortly after, they rechecked it and it was 137/\"something\". He denies SOA or CP. Unsure when his next Janet Torres appt is. He said she wants him to work on counting carbs but    Date: Jul 17 2020  2:34PM     Creator: ana luisa  AMEND: last entry from 7/1/7 was on incorrect pt, please disregard***********    Date: Jul 17 2020  2:33PM     Creator: ana luisa  I reviewed pt's  note from [facility]. i contacted the nurse who charted on him last. I stressed the need for in-pt rehab, per pt's mom, due to noncompliance. She agreed and agreed to pass on to social work .5 min    Date: Jul 17 2020  2:15PM     Creator: ana luisa  cont.. now. 25 min    Date: Jul 17 2020  2:15PM     Creator: ana luisa  Pt replied to email with BP and sugar logs (see scanned in documents.) Very poor control over blood sugar. AM sugars in upper 100s. Eveing/night time sugars in 300-400s. Systolic BP ranging from 120-140+, mainly 130-140s. Pt saw Galina Torres GERALD last on 6/17 per [facility] ntoes. I printed and scanned into chart. I let Kun see the BP log, sugar log, and Paddy note. He " is concerned about the 2-3+ pitting LE edema noted. He wants to check labs. He also wants to see pt next week. I called pt and pt admitted to swelling in ankles. Mild SOA on exertion. Kun made aware. Kun ordered BNP and CMP. These will need to be done at [facility]. I will order and fax. Made acute appt for pt with Kun next Tuesday. I told pt he needed to get labs done before appts so both doctors could see results. He will try. A misc note was made in chart regarding swelling, hoping cardio would see it at appt next week and address it as well. Pt was increased to 40mg Lisinopril from 30mg. Order sent to pharmacy. I will enter and fax labs orders    Date: Jul 17 2020  9:52AM     Creator: ana luisa  Prior to contacting pt for CCM contact, i reviewed appts, tasks, notes. Pt saw Dr Stone recently for bladder mass. PT was then set up with a cysto in office for this July. Pt also will see his cardio doctor this month. I emailed pt (preferred contact method) with these two upcoming appts. I asked for updated BP and blood sugar log. i will share this with Kun DUARTE (PCP). 10 min                   Electronically Signed by: Mavis Miranda RN -Author on July 28, 2020 02:19:59 PM

## 2021-05-10 NOTE — OUTREACH NOTE
"   Quick Note      Patient Name: Aleksandar Marks   Patient ID: [id number]   Sex: Male   YOB: 1945    Primary Care Provider: Kun DUARTE   Referring Provider: Kun DUARTE    Visit Date: July 28, 2020    Provider: GERALD Delatorre   Location: [facility]   Location Address: [address] [place] [address]  [id number]   Location Phone: [phone]          History Of Present Illness  CCM Comprehensive Care Plan    This Chronic Medical Management Care Plan for Aleksandar Marks, 74 year old /White male, has been monitored and managed for the month of July. A cumulative time of 80 minutes was spent on this patient record, including face to face with provider, electronic communication via patient portal , and chart review.   Regarding the patient's diagnoses Diabetes, Shortness of Breath, Sinus trouble, and Skin Disease, the following items were adressed: medical records and medications and any changes can be found within the plan section of the note. A detailed listing of time spent for chronic care management has been scanned into the patient's electronic record. Current medications include: aspirin 81 mg oral tablet,delayed release (DR/EC), atenolol 25 mg oral tablet, cetirizine 10 mg oral tablet, cyanocobalamin (vitamin B-12) 1,000 mcg/mL injection solution, fluticasone propionate 50 mcg/actuation nasal spray,suspension, lisinopril 30 mg oral tablet, Novolog U-100 Insulin aspart 100 unit/mL subcutaneous solution, pen needle, diabetic 31 gauge x 1/4\" miscellaneous needle, simvastatin 20 mg oral tablet, and Toujeo Max U-300 SoloStar 300 unit/mL (3 mL) subcutaneous insulin pen and the patient is reported to be compliant with medication protocol. Medications are reported to be effective.   The patient was monitored remotely for blood pressure and blood glucose for a period of 80 minutes.   This patient's physical needs include: physical healthcare. BP and BS monitoring. "   Patient's mental support needs are currently being met.   The patient's cognitive support needs are currently being met.   The patient's psychosocial support needs are N/A,   This patient's functional needs are N/A.   This patient's environmental needs are N/A.           Assessment  · Chronic Care Management (CCM)     V68.89/Z02.89  · Diabetes     250.92  · Hypertension     401.9/I10      Plan  · Orders  o Complex chronic care management services, with the following req (45732) - - 07/17/2020  · Instructions  o Patient's Health Care Goals: BP wnl and A1C wnl  o Provider's Health Care Goals: A1C wnl  o Patient was provided an electronic copy of care plan  o CCM services were explained and offered and patient has accepted these services.  o Patient has given their written consent to recieve CCM services and understands that this includes the authorization of electronic communication of medical information with other treating providers.  o Patient understands that they may stop CCM services at any time and these changes will be effective at the end of the calendar month and will effectively revocate the agreement of CCM services.  o Patient understands that only one practioner can furnish and be paid for CCM services during one calendar month. Patient also understands that there may be co-payment or deductible fees in association with CCM services.  o Patient will continue with at least monthly follow-up calls with the Nurse Navigator.  · Associate Tasks  o Task ID [id number] CCM: CCM July 2020            Electronically Signed by: Mavis Miranda RN -Author on July 28, 2020 02:24:16 PM

## 2021-05-10 NOTE — OUTREACH NOTE
"   Quick Note      Patient Name: Aleksandar Marks   Patient ID: [id number]   Sex: Male   YOB: 1945    Primary Care Provider: Kun DUARTE   Referring Provider: Kun DUARTE    Visit Date: June 29, 2020    Provider: GERALD Dodson   Location: [facility]   Location Address: [address] [place] [address], [address]  [id number]   Location Phone: [phone]          History Of Present Illness  CCM Comprehensive Care Plan    This Chronic Medical Management Care Plan for Aleksandar Marks, 74 year old /White male, has been monitored and managed for the month of June. A cumulative time of 114 minutes was spent on this patient record, including face to face with provider, phone call with patient, and chart review.   Regarding the patient's diagnoses Diabetes, Shortness of Breath, Sinus trouble, and Skin Disease, the following items were adressed: medical records, medications, changes to medical care, and referrals to community service providers and any changes can be found within the plan section of the note. A detailed listing of time spent for chronic care management has been scanned into the patient's electronic record. Current medications include: Actos 45 mg oral tablet, aspirin 81 mg oral tablet,delayed release (DR/EC), atenolol 25 mg oral tablet, cetirizine 10 mg oral tablet, cyanocobalamin (vitamin B-12) 1,000 mcg/mL injection solution, fluticasone propionate 50 mcg/actuation nasal spray,suspension, lisinopril 30 mg oral tablet, metformin 500 mg oral tablet extended release 24 hr, montelukast 10 mg oral tablet, Novolog U-100 Insulin aspart 100 unit/mL subcutaneous solution, pen needle, diabetic 31 gauge x 1/4\" miscellaneous needle, simvastatin 20 mg oral tablet, Toujeo Max U-300 SoloStar 300 unit/mL (3 mL) subcutaneous insulin pen, and vitamin B12-folic acid 500-400 mcg oral tablet and the patient is reported to be compliant with medication protocol. Medications are reported to be " non-effective in controlling symptoms and changes have been made to the medication protocol. Patient was reeducated on eating a diabetic, healthy diet.   The patient was monitored remotely for blood pressure and blood glucose for a period of 60 minutes.   This patient's physical needs include: physician referral. pt was supposed to see urology for bladder mass and right kidney mass- instructed pt on how to rearrange appt since he missed the last one.   Patient's mental support needs are currently being met.   The patient's cognitive support needs are currently being met.   The patient's psychosocial support needs include:, continued support. pt needs a lot of reeducation and encouragement to maintain healthy lifestyle.   This patient's functional needs are N/A.   This patient's environmental needs are N/A.           Assessment  · Chronic Care Management (CCM)     V68.89/Z02.89  · Diabetes     250.92  · Hypertension     401.9/I10  · Bladder mass     596.89/N32.89      Plan  · Orders  o Complex chronic care management services, with the following req (96547) - V68.89/Z02.89, 250.92, 401.9/I10, 596.89/N32.89 - 06/08/2020  o Complex chronic care management services, with the following req (40691) (88168) - V68.89/Z02.89, 250.92, 401.9/I10, 596.89/N32.89 - 06/08/2020  · Instructions  o Patient's Health Care Goals: Hgb A1C below 7  o Provider's Health Care Goals: fasting blood sugar of 120  o Patient was provided an electronic copy of care plan  o CCM services were explained and offered and patient has accepted these services.  o Patient has given their written consent to recieve CCM services and understands that this includes the authorization of electronic communication of medical information with other treating providers.  o Patient understands that they may stop CCM services at any time and these changes will be effective at the end of the calendar month and will effectively revocate the agreement of CCM  services.  o Patient understands that only one practioner can furnish and be paid for CCM services during one calendar month. Patient also understands that there may be co-payment or deductible fees in association with CCM services.  o Patient will continue with at least monthly follow-up calls with the Nurse Navigator.  · Associate Tasks  o Task ID [id number] CCM: [facility] June 2020            Electronically Signed by: Mavis Miranda RN -Author on June 29, 2020 02:07:37 PM

## 2021-05-10 NOTE — PROCEDURES
"   Procedure Note      Patient Name: Aleksandar Marks   Patient ID: [id number]   Sex: Male   YOB: 1945    Primary Care Provider: Kun DUARTE   Referring Provider: Kun DUARTE    Visit Date: August 4, 2020    Provider: Suhail Dominique MD   Location: [facility]   Location Address: [address] [place] [address], [address]   BRANDON Hurtado  [id number]   Location Phone: [phone]          FINAL REPORT   TRANSTHORACIC ECHOCARDIOGRAM REPORT    Diagnosis: Congestive heart failure, abnormal EKG.   Height: 5'8\" Weight: 245 B/P: 132/84 BSA: 2.23   Tech: JLW   MEASUREMENTS:  RVID (Diastole) : RVID. (NORMAL: 0.7 to 2.4 cm max)   LVID (Systole): 3.4 cm (Diastole): 5.4 cm . (NORMAL: 3.7 - 5.4 cm)   Posterior Wall Thickness (Diastole): 1.1 cm. (NORMAL: 0.8 - 1.1 cm)   Septal Thickness (Diastole): 0.9 cm. (NORMAL: 0.7 - 1.2 cm)   LAID (Systole): 3.1 cm. (NORMAL: 1.9 - 3.8 cm)   Aortic Root Diameter (Diastole): 3.6 cm. (NORMAL: 2.0 - 3.7 cm)   DOPPLER:  E/A ratio 0.6 (NORMAL 0.8-2.0)   DT: 315 msec (NORMAL 140-240 msec.)   IVRT 117 m/sec (NORMAL  m/sec.)   E/E': 14 (NORMAL <8 avg.)   COMMENTS:  The patient underwent 2-D, M-Mode, and Doppler examination, including pulse-wave, continuous-wave, and color-flow analysis; the study is technically adequate. The following was observed:   FINDINGS:  AORTIC VALVE: Fibrocalcific.   MITRAL VALVE: Fibrocalcific.   TRICUSPID VALVE: Normal.   PULMONIC VALVE: Not well visualized.   LEFT ATRIUM: Normal. No intracavitary masses or clots seen. LA volume index is 33 mL/m2.   AORTIC ROOT: Normal in size with adequate motion.   LEFT VENTRICLE: Normal left ventricular systolic function. Ejection fraction 60%.   RIGHT ATRIUM: Normal.   RIGHT VENTRICLE: Normal size and function.   PERICARDIUM: Unremarkable. No evidence of effusion.   INFERIOR VENA CAVA: Diameter is 1.4 cm.   DOPPLER: Doppler examination of the aortic, mitral, tricuspid, and pulmonary valves was " performed. There is mild aortic regurgitation and mild aortic stenosis with peak gradient of 10 mm.   Faxed: 08/05/2020      CONCLUSION:  1.  Fibrocalcific mitral and aortic valves.    2.  Normal left ventricular systolic function.   3.  Mild aortic stenosis.   4.  Mild aortic regurgitation.      MD CHARLES Piper/efrain    This note was transcribed by Tammy Stringer.  efrain/CHARLES  The above service was transcribed by Tammy Stringer, and I attest to the accuracy of the note.  CHARLES                 Electronically Signed by: Elma Stringer-, Other -Author on August 5, 2020 09:52:51 AM  Electronically Co-signed by: Suhail Dominique MD -Reviewer on August 15, 2020 09:30:45 AM

## 2021-05-10 NOTE — OUTREACH NOTE
"   Quick Note      Patient Name: Aleksandar Marks   Patient ID: [id number]   Sex: Male   YOB: 1945    Primary Care Provider: Demar DUARTE   Referring Provider: Demar DUARTE    Visit Date: October 29, 2020    Provider: GERALD Chavez   [place] [facility] [place]   [place] Address: [address] [place] [address], [address] [place] [address]  [id number]   Location Phone: [phone]          History Of Present Illness     [facility]     TaskID: [id number]    Task Subject: [facility] Oct 2020    Task Comments:    Date: Oct 15 2020  9:47AM     Creator: ana luisa  Pt emailed back stating he picked up insulin samples and also let Galina know he had not heard from pump people. I asked for BP log. 3 min    Date: Oct 14 2020 11:42AM     Creator: ana luisa  I had not heard from pt in over a week. I sent him an email asking if he got more samples fro Galina's office and if he had heard back from insulin company on pump. 3 min    Date: Oct  7 2020 10:02AM     Creator: ana luisa  cont... PCP back in office. We reviewed BP log. He advised pt to cut back on salt. I emailed him these instructions, educating him on high salt and processed foods. He said \"will do.\" I asked if he had called Galina yet for samples on insulin, waiting on reply. 14 min    Date: Oct  7 2020 10:01AM     Creator: ana luisa  Pt emailed me a list of BPs that i FWD to PCP (Kun DUARTE.) His sugars were also in this email. His glucose is being managed by Galina DUARTE. Pt is waiting to hear from the insulin pump company to see how much it is going to be. PCP was out of office on the day the email, but BP was not in dangerous range. Pt had BPs in 140-150s, but also had them in 110-120s. Both ranges have been pt's norm. Pt sent me another email stating he had gotten a few calls from the insulin company but never got a price estimate. He does not want to order the pump if he cannot afford it. I replied asking if they were going to look into " the pricing for the insulin pump for him. He replied saying he was not sure. He is running out of insulin. I advised him to call Galina's office and ask for more smaples. He said he always reaches the  and not Galina. I told him that i do as well. He will never likely just call straight through to her. I told him to call ASAP because i did not want him to run out of insulin.                   Electronically Signed by: Mavis Miranda RN -Author on October 29, 2020 04:15:29 PM

## 2021-05-10 NOTE — H&P
"   History and Physical      Patient Name: Aleksandar Marks   Patient ID: [id number]   Sex: Male   YOB: 1945    Primary Care Provider: Kun DUARTE   Referring Provider: Kun DUARTE    Visit Date: July 21, 2020    Provider: Suhail Dominique MD   Location: [facility]   Location Address: [address] [place] [address], [facility]   BRANDON Hurtado  [id number]   Location Phone: [phone]          Chief Complaint     Pedal edema.  Shortness of breath.       History Of Present Illness  Consult requested by: Kun DUARTE   Aleksandar Marks is a 74 year old /White male with a history of pedal edema for the past 1 month that has gotten progressively worse. He has shortness of breath on exertion. No PND. No chest pain. No syncopal or presyncopal episodes.   PAST MEDICAL HISTORY: Arthritis; Diabetes mellitus, insulin dependent (IDDM), controlled; Hyperlipidemia; Hypertension.   FAMILY HISTORY: Positive for hypertension and heart disease. Negative for diabetes mellitus.   PSYCHOSOCIAL HISTORY: Denies alcohol or tobacco use. Daily caffeine intake. . Denies mood changes or depression.   CURRENT MEDICATIONS: Actos 45 mg oral tablet, aspirin 81 mg oral tablet, delayed release (DR/EC), atenolol 25 mg oral tablet, cetirizine 10 mg oral tablet, cyanocobalamin (vitamin B-12) 1,000 mcg/mL injection solution, fluticasone propionate 50 mcg/actuation nasal spray, suspension, lisinopril 30 mg oral tablet, montelukast 10 mg oral tablet, Novolog U-100 Insulin aspart 100 unit/mL subcutaneous solution, pen needle, diabetic 31 gauge x 1/4\" miscellaneous needle, simvastatin 20 mg oral tablet, Toujeo Max U-300 SoloStar 300 unit/mL (3 mL) subcutaneous insulin pen. Medication list per PCP note dated 06/29/2020.   ALLERGIES: No known drug allergies.   CHOLESTEROL STATUS: Unknown.       Review of Systems  · Constitutional  o Admits  o : fatigue, good general health lately, recent weight changes " "  · Eyes  o Admits  o : blurred vision  · HENT  o Admits  o : hearing loss or ringing, chronic sinus problem  o Denies  o : swollen glands in neck  · Cardiovascular  o Admits  o : swelling (feet, ankles, hands), shortness of breath with activities   o Denies  o : chest pain, palpitations (fast, fluttering, or skipping beats)  · Respiratory  o Denies  o : asthma or wheezing, COPD  · Gastrointestinal  o Denies  o : ulcers, nausea or vomiting  · Neurologic  o Denies  o : lightheaded or dizzy, stroke, headaches  · Musculoskeletal  o Admits  o : joint pain, back pain  · Endocrine  o Admits  o : diabetes, heat or cold intolerance, excessive thirst or urination  o Denies  o : thyroid disease  · Heme-Lymph  o Denies  o : bleeding or bruising tendency, anemia      Vitals  Date Time BP Position Site L\R Cuff Size HR RR TEMP (F) WT  HT  BMI kg/m2 BSA m2 O2 Sat HC       07/21/2020 09:58 /78 Sitting    64 - R   242lbs 16oz 5'  8\" 36.95 2.3     07/21/2020 09:58 /68 Sitting    62 - R                 Physical Examination  · Constitutional  o Appearance  o : Awake, alert, cooperative, pleasant.  · Eyes  o Pupils and Irises  o : Pupils equal and reacting to light and accommodation.  · Ears, Nose, Mouth and Throat  o Ears  o : Tympanic membranes are normal.  o Nose  o : Clear with no maxillary tenderness.  o Oral Cavity  o : Clear.  · Neck  o Inspection/Palpation  o : No JVD, bruits, thyromegaly, or adenopathy. Trachea midline. No axillary or cervical lymphadenopathy.  o Thyroid  o : No thyroid enlargement.   · Respiratory  o Inspection of Chest  o : No chest wall deformities, moving equal.  o Auscultation of Lungs  o : Good air entry with vesicular breath sounds.  o Percussion of Chest  o : Resonant bilaterally.   o Palpation of Chest  o : Equal movements bilaterally.  · Cardiovascular  o Heart  o :   § Auscultation of Heart  § : PMI is in the 5th intercostal space. S1 and S2 regular. No S3. No S4.   o Peripheral Vascular " System  o :   § Extremities  § : No pedal edema. Peripheral pulses well felt. No cyanosis.  · Gastrointestinal  o Abdominal Examination  o : No organomegaly, masses, tenderness, bruits, or abnormal pulsations. Bowel sounds are normal.  · Musculoskeletal  o General  o : Muscle strength is normal with normal tone.  · Skin and Subcutaneous Tissue  o General Inspection  o : No rash, petechiae, or suspicious skin lesions. Skin turgor is normal.  · EKG  o EKG  o : Performed in the office today.  o Indications  o : Shortness of breath.  o Results  o : Sinus rhythm. Intraventricular conduction delay. Nonspecific ST-T changes.          Assessment     ASSESSMENT & PLAN:    1.  Abnormal EKG with shortness of breath and positive risk factors.  In view of his risk factors and abnormal        EKG, we will do a Lexiscan stress test to rule out any significant ischemia.  He cannot walk on a treadmill.         We will also do an echocardiogram to evaluate the left ventricular systolic function.    2.  Chronic diastolic heart failure.  Continue current dose of atenolol.  Add hydrochlorothiazide 25 mg once a        day.  Be on a low-salt diet.  Decrease salt and fluid intake.    3.  Essential hypertension, controlled.  Continue atenolol.  4.  Hyperlipidemia.  Continue current dose of simvastatin.      Suhail Dominique MD, FACC  CHARLES:vm             Electronically Signed by: Galina Jose-, Other -Author on July 23, 2020 06:57:47 AM  Electronically Co-signed by: Suhail Dominique MD -Reviewer on July 23, 2020 09:42:00 AM

## 2021-05-10 NOTE — OUTREACH NOTE
Quick Note      Patient Name: Aleksandar Marks   Patient ID: [id number]   Sex: Male   YOB: 1945    Primary Care Provider: Demar DUARTE   Referring Provider: Demar DUARTE    Visit Date: January 28, 2021    Provider: GERALD Chavez   Location: [facility] [place]   Location Address: [address] [place] [address], [address] [place] [address]  [id number]   Location Phone: [phone]          History Of Present Illness     [facility]     TaskID: [id number]    Task Subject: [facility] Jan 2021    Task Comments:    Date: Jan 19 2021 11:41AM     Creator: ana luisa  Pt called me. I read him xray result and he agreed to ortho consult. I added order. He said his BPs were 150s/90s, now, since adding back HCTZ, they are 120s/80s. He will send me log next week. He said he is still SOA with exertion. No CP, swelling (other than right knee, PCP aware), or fever or coguh. He admits to only using the new, daily, inhaler twice now. He just picked it up from pharmacy. I instructed pt to take daily and call me by the end of the week if SOA not improved or worse. He agreed. 15 min    Date: Jan 19 2021 11:21AM     Creator: ana luisa  I reviewed pt's last office note. Pt had ran out of HCTZ and did not request refill. He c/o SOA. Lung sounds diminished. His right knee was popping and he had pain. Kun DUARTE ordered knee xray that showed fluid in knee, ortho to be consulted. Pt was called, lmtrc per task. Pt's HCTZ was refilled. Pt should also be taking Lisinopril and Atenolol. Pt to start Anoro Ellipta inhaler daily for SOA. I emailed pt (his preferred contact method) asking to call me for xray results. I  will f/u on SOA and meds (BP) once i discuss knee. 6 min                   Electronically Signed by: Mavis Miranda RN -Author on January 28, 2021 11:10:54 AM

## 2021-05-10 NOTE — OUTREACH NOTE
"   Quick Note      Patient Name: Aleksandar Marks   Patient ID: [id number]   Sex: Male   YOB: 1945    Primary Care Provider: Demar DUARTE   Referring Provider: Demar DUARTE    Visit Date: October 29, 2020    Provider: GERALD Chavez   Location: [facility]   Location Address: [address] [place] [address], [address]  [id number]   Location Phone: [phone]          History Of Present Illness  CCM Comprehensive Care Plan    This Chronic Medical Management Care Plan for Aleksandar Marks, 74 year old /White male, has been monitored and managed for the month of October. A cumulative time of 20 minutes was spent on this patient record, including face to face with provider, electronic communication via patient portal , electronic communication with primary care provider, and chart review.   Regarding the patient's diagnoses Diabetes, Shortness of Breath, Sinus trouble, and Skin Disease, the following items were adressed: medical records and medications and any changes can be found within the plan section of the note. A detailed listing of time spent for chronic care management has been scanned into the patient's electronic record. Current medications include: Accu-Chek Irina Control Soln miscellaneous solution, Accu-Chek Irina Plus Meter miscellaneous misc, Accu-Chek Irina Plus test strp miscellaneous strip, Accu-Chek Softclix Lancets miscellaneous misc, alcohol swabs topical pads, medicated, aspirin 81 mg oral tablet,delayed release (DR/EC), atenolol 25 mg oral tablet, cetirizine 10 mg oral tablet, cyanocobalamin (vitamin B-12) 1,000 mcg/mL injection solution, fluticasone propionate 50 mcg/actuation nasal spray,suspension, Humalog U-100 Insulin 100 unit/mL subcutaneous solution, hydrochlorothiazide 25 mg oral tablet, lisinopril 40 mg oral tablet, pen needle, diabetic 31 gauge x 1/4\" miscellaneous needle, simvastatin 20 mg oral tablet, and Toujeo Max U-300 " SoloStar 300 unit/mL (3 mL) subcutaneous insulin pen and the patient is reported to be compliant with medication protocol. Medications are reported to be effective.   The patient was monitored remotely for blood pressure and blood glucose for a period of 20 minutes.   This patient's physical needs include: physical healthcare and DME supplies. pt is waiting to hear back from insulin company to find out pricing on insulin pump. Monitoring BP as well.   Patient's mental support needs are currently being met.   The patient's cognitive support needs are currently being met.   The patient's psychosocial support needs include:, continued support.   This patient's functional needs include: DME supplies. waiting to get insulin pump.   This patient's environmental needs are N/A.           Assessment  · Chronic Care Management (CCM)     V68.89/Z02.89  · Diabetes     250.92  · Sinus trouble     473.9/J34.9  · Hypertension     401.9/I10  · Overweight     278.02/E66.3      Plan  · Medications  o Medications have been Reconciled  o Transition of Care or Provider Policy  · Instructions  o Patient's Health Care Goals: BP wnl  o Provider's Health Care Goals: A1C wnl  o Patient was provided an electronic copy of care plan  o CCM services were explained and offered and patient has accepted these services.  o Patient has given their written consent to recieve CCM services and understands that this includes the authorization of electronic communication of medical information with other treating providers.  o Patient understands that they may stop CCM services at any time and these changes will be effective at the end of the calendar month and will effectively revocate the agreement of CCM services.  o Patient understands that only one practioner can furnish and be paid for CCM services during one calendar month. Patient also understands that there may be co-payment or deductible fees in association with CCM services.  o Patient will  continue with at least monthly follow-up calls with the Nurse Navigator.  · Associate Tasks  o Task ID [id number] CCM: [facility] Oct 2020            Electronically Signed by: Mavis Miranda RN -Author on October 29, 2020 04:15:49 PM

## 2021-05-10 NOTE — H&P
"   History and Physical      Patient Name: Aleksandar Marks   Patient ID: [id number]   Sex: Male   YOB: 1945    Primary Care Provider: Kun DUARTE   Referring Provider: Kun DUARTE    Visit Date: July 6, 2020    Provider: Tuyet Stone MD   Location: [facility]   Location Address: [address] [place] [address]  [id number]   Location Phone: [phone]          Chief Complaint     Bladder mass       History Of Present Illness  The patient is a 74 year old /White male, who presents on referral from Kun DUARTE, for a urological evaluation for the findings of possible bladder mass on renal/bladder ultrasound. The patient had findings of a renal cyst on low dose CT scan for lung cancer screening and then was sent for ultrasound. The patient has no  symptoms.   There is no known related pathology at this time. He has not had a cystoscopy.   Since the last viisit, diagnostic studies have not been performed.       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; Shortness of Breath; Sinus trouble; Skin Disease         Past Surgical History  Back surgery; Colonoscopy; Joint Surgery; Knee surgery         Medication List  aspirin 81 mg oral tablet,delayed release (DR/EC); atenolol 25 mg oral tablet; cetirizine 10 mg oral tablet; cyanocobalamin (vitamin B-12) 1,000 mcg/mL injection solution; fluticasone propionate 50 mcg/actuation nasal spray,suspension; lisinopril 30 mg oral tablet; Novolog U-100 Insulin aspart 100 unit/mL subcutaneous solution; pen needle, diabetic 31 gauge x 1/4\" miscellaneous needle; simvastatin 20 mg oral tablet; Toujeo Max U-300 SoloStar 300 unit/mL (3 mL) subcutaneous insulin pen         Allergy List  NO KNOWN DRUG ALLERGIES       Allergies Reconciled  Family Medical History  Stroke; Heart Disease; -Father's " "Family History Unknown; -Mother's Family History Unknown; Family history of cancer; Family history of stroke; Family history of heart disease         Social History  Alcohol (Light); lives with children; Retired; Tobacco (Former);          Immunizations  Name Date Admin   Influenza    [id number]    Prevnar 13          Review of Systems  · Constitutional  o Denies  o : fatigue, fever  · Cardiovascular  o Denies  o : chest pain, heart disease, heart attack  · Respiratory  o Denies  o : lung disease, shortness of breath, asthma  · Gastrointestinal  o Denies  o : stomach or bowel disease, blood in stools, ulcers  · Genitourinary  o Denies  o : frequent urination , kidney or bladder infections, urgency or urination, difficulty voiding, interrupted stream, urgency incontinence, urinary leakage, voiding at night, painful intercourse, painful urination, straining to urinate, sexual dysfunction, blood in urine, slow stream, kidney stone  · Neurologic  o Denies  o : neurologic disease  · Musculoskeletal  o Denies  o : swelling or pain in your lower extremities      Vitals  Date Time BP Position Site L\R Cuff Size HR RR TEMP (F) WT  HT  BMI kg/m2 BSA m2 O2 Sat        07/06/2020 02:58 /63 Sitting       248lbs 0oz 5'  8\" 37.71 2.32           Physical Examination  · Constitutional  o Appearance  o : well-nourished, well developed, alert, in no acute distress  · Neck  o Inspection/Palpation  o : normal appearance, no masses or tenderness, trachea midline  · Respiratory  o Respiratory Effort  o : breathing unlabored  · Gastrointestinal  o Abdominal Examination  o : abdomen nontender to palpation, tone normal without rigidity or guarding, no masses present, abdominal obesity present   · Skin and Subcutaneous Tissue  o General Inspection  o : no rashes or lesions present, no lesions present, no areas of discoloration  · Neurologic  o Mental Status Examination  o :   § Orientation  § : grossly oriented to person, place " and time  o Gait and Station  o : normal gait, using a cane  · Psychiatric  o Judgement and Insight  o : judgment and insight intact, judgement for everyday activities and social situations within normal limits, insight intact  o Mood and Affect  o : mood normal, affect appropriate          Results  · In-Office Procedures  o Lab procedure  § Automated dipstick urinalysis with microscopy (16466)   § Color Ur: Yellow   § Clarity Ur: Clear   § Glucose Ur Ql Strip: 500   § Bilirub Ur Ql Strip: Negative   § Ketones Ur Ql Strip: Negative   § Sp Gr Ur Qn: >=1.030   § Hgb Ur Ql Strip: Negative   § pH Ur-LsCnc: 6.5   § Prot Ur Ql Strip: Negative   § Urobilinogen Ur Strip-mCnc: 1.0   § Nitrite Ur Ql Strip: Negative   § WBC Est Ur Ql Strip: Negative   § RBC UrnS Qn HPF: 0   § WBC UrnS Qn HPF: 0   § Bacteria UrnS Qn HPF: 0   § Crystals UrnS Qn HPF: 0   § Epithelial Cells (non renal): 0 /HPF  § Epithelial Cells (renal): 0       Assessment  · Bladder mass     596.89/N32.89    Problems Reconciled  Plan  · Medications  o Medications have been Reconciled  o Transition of Care or Provider Policy  · Instructions  o He will have a cystoscopy in the office. I will see him then.             Electronically Signed by: Tuyet Stone MD -Author on July 6, 2020 03:22:11 PM

## 2021-05-10 NOTE — H&P
History and Physical      Patient Name: Aleksandar Marks   Patient ID: [id number]   Sex: Male   YOB: 1945    Primary Care Provider: Demar DUARTE   Referring Provider: Demar DUARTE    Visit Date: January 28, 2021    Provider: Sin Gillis MD   Location: [facility]   Location Address: [address] [place] [address]  [id number]   Location Phone: [phone]          Chief Complaint  · Right knee pain      History Of Present Illness  Aleksandar Marks is a 75 year old /White male who presents today to [facility].      The patient presents here today for evaluation of his right knee. He has been having knee pain for a couple months. He has no known injury. He has a history of an arthroscopic meniscus surgery 20 years ago. He previously had x-rays of his right knee and we reviewed those today. He states pain with going up and down stairs.  He admits to popping in his right knee.            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; Shortness of Breath; Sinus trouble; Skin Disease         Past Surgical History  Back surgery; Colonoscopy; Joint Surgery; Knee surgery         Medication List  Accu-Chek Irina Control Soln miscellaneous solution; Accu-Chek Irina Plus Meter miscellaneous misc; Accu-Chek Irina Plus test strp miscellaneous strip; Accu-Chek Softclix Lancets miscellaneous misc; albuterol sulfate 90 mcg/actuation inhalation HFA aerosol inhaler; alcohol swabs topical pads, medicated; Anoro Ellipta 62.5-25 mcg/actuation inhalation blister with device; aspirin 81 mg oral tablet,delayed release (DR/EC); atenolol 25 mg oral tablet; cetirizine 10 mg oral tablet; cyanocobalamin (vitamin B-12) 1,000 mcg/mL injection solution; fluticasone propionate 50 mcg/actuation nasal spray,suspension; hydrochlorothiazide 25  "mg oral tablet; lisinopril 40 mg oral tablet; Novolin R Regular U-100 Insuln 100 unit/mL injection solution; pen needle, diabetic 31 gauge x 1/4\" miscellaneous needle; simvastatin 20 mg oral tablet         Allergy List  NO KNOWN DRUG ALLERGIES       Allergies Reconciled  Family Medical History  Stroke; Heart Disease; -Father's Family History Unknown; -Mother's Family History Unknown; Family history of cancer; Family history of stroke; Family history of heart disease         Social History  Alcohol (Light); lives with children; Retired; Tobacco (Former);          Review of Systems  · Constitutional  o Denies  o : fever, chills, weight loss  · Cardiovascular  o Denies  o : chest pain, shortness of breath  · Gastrointestinal  o Denies  o : liver disease, heartburn, nausea, blood in stools  · Genitourinary  o Denies  o : painful urination, blood in urine  · Integument  o Denies  o : rash, itching  · Neurologic  o Denies  o : headache, weakness, loss of consciousness  · Musculoskeletal  o Denies  o : painful, swollen joints  · Psychiatric  o Denies  o : drug/alcohol addiction, anxiety, depression      Vitals  Date Time BP Position Site L\R Cuff Size HR RR TEMP (F) WT  HT  BMI kg/m2 BSA m2 O2 Sat FR L/min FiO2 HC       01/28/2021 02:01 PM         235lbs 0oz 5'  8\" 35.73 2.26             Physical Examination  · Constitutional  o Appearance  o : well developed, well-nourished, no obvious deformities present  · Head and Face  o Head  o :   § Inspection  § : normocephalic  o Face  o :   § Inspection  § : no facial lesions  · Eyes  o Conjunctivae  o : conjunctivae normal  o Sclerae  o : sclerae white  · Ears, Nose, Mouth and Throat  o Ears  o :   § External Ears  § : appearance within normal limits  § Hearing  § : intact  o Nose  o :   § External Nose  § : appearance normal  · Neck  o Inspection/Palpation  o : normal appearance  o Range of Motion  o : full range of motion  · Respiratory  o Respiratory Effort  o : " breathing unlabored  o Inspection of Chest  o : normal appearance  o Auscultation of Lungs  o : no audible wheezing or rales  · Cardiovascular  o Heart  o : regular rate  · Gastrointestinal  o Abdominal Examination  o : soft and non-tender  · Skin and Subcutaneous Tissue  o General Inspection  o : intact, no rashes  · Psychiatric  o General  o : Alert and oriented x3  o Judgement and Insight  o : judgment and insight intact  o Mood and Affect  o : mood normal, affect appropriate  · Right Knee  o Inspection  o : Small effusion. Non tender. 0-120 ROM. Crepitus with ROM. Stable to varus and valgus stress. Stable to anterior and posterior drawer. Negative Lachman's. Negative Pradeep's  · Injection Note/Aspiration Note  o Site  o : right knee  o Procedure  o : Procedure: After educating the patient, patient gave consent for procedure. After using Chloraprep, the joint space was injected. The patient tolerated the procedure well.  o Medication  o : Right knee was aspirated prior to injecting 80 mg of DepoMedrol with 9cc of 1% Lidocaine  · Imaging  o Imaging  o : [facility] X-ray 1/2021: Arthritic change of the right knee          Assessment  · Primary osteoarthritis of right knee     715.16/M17.11  · Right knee pain, unspecified chronicity     719.46/M25.561      Plan  · Orders  o Depo-Medrol injection 80mg () - 715.16/M17.11 - 01/28/2021   Lot [id number] Exp 11 2021 [employer] Administered by LALA VALENCIA MD   o Knee Intra-articular Injection without US Guidance [facility] (77505) - 715.16/M17.11 - 01/28/2021   Lot [id number] Exp 03 01 2022 [employer] Administered by LALA DOMÍNGUEZ MD   · Medications  o Medications have been Reconciled  o Transition of Care or Provider Policy  · Instructions  o Reviewed the patient's Past Medical, Social, and Family history as well as the ROS at today's visit, no changes.  o Call or return if worsening symptoms.  o Discussed surgery.  o Risks/benefits discussed with patient  including, but not limited to: infection, bleeding, neurovascular damage, malunion, nonunion, aesthetic deformity, need for further surgery, and death.  o Surgery pamphlet given.  o The above service was scribed by Kourtney Whiting on my behalf and I attest to the accuracy of the note. jsb  o Discussed the treatment options with the patient, conservative vs right Total Knee Arthroplasty. Conservative treatment consist of injections, physical therapy, NSAIDS, pain gels. Discussed the risks and benefits of a right Total Knee Arthroplasty. The patient wishes for aspiration and steroid injection ad this time. Discussed the risks and benefits and he expressed understanding and wished to proceed. Will call back if he decides to schedule a right Total Knee Arthroplasty. He tolerated the injection well. I aspirated 30cc of clear joint fluid.   o Electronically Identified Patient Education Materials Provided Electronically            Electronically Signed by: Kourtney Whiting MA -Author on January 29, 2021 01:22:44 PM  Electronically Co-signed by: Sin Gillis MD -Reviewer on January 29, 2021 01:35:51 PM

## 2021-05-10 NOTE — OUTREACH NOTE
Quick Note      Patient Name: Aleksandar Marks   Patient ID: [id number]   Sex: Male   YOB: 1945    Primary Care Provider: Kun DUARTE   Referring Provider: Kun DUARTE    Visit Date: August 26, 2020    Provider: GERALD Dodson   Location: [facility]   Location Address: [address] [place] [address], [address]  [id number]   Location Phone: [phone]          History Of Present Illness     [facility]     TaskID: [id number]    Task Subject: CCM August 2020    Task Comments:    Date: Aug 18 2020  8:18AM     Creator: ana luisa  pt emailed back stating he could  rx today if i told him where to get it. He also asked if it was time for his b12 injection. i reviewed chart and it appears that his last injection was 7/2 and i emailed him this info. I also printed off rx for test strips and put it up front. 5 min    Date: Aug 17 2020  9:49AM     Creator: ana luisa  Pt emailed me regarding his test strips. He said he called the pharamcy over the weekend x2 and they did not have the rx i faxed. I agreed to call them and see. He also asked about HCTZ his cardiologist rx'd him. He is out and needs a refill. I emailed him back stating all he has to do is call that office and ask for refill. Kun is off today so i could not refill it anyway. I called pharmacy and staff stated that due to pt's insurance, the rx for test strips has to be a hard prescription, brought in-person, or electronically sent (not faxed.) His specific test strip type was not in our computer system and since i had to manually type it out, it will not let me escribe it. Pt will have to come into office and . I emailed him with this info, asking when he will be in to pick it up. 10 min    Date: Aug 11 2020  3:44PM     Creator: ana luisa  i looked in [employer] and found 7/29 appt with Brian. I printed it and scanend into pt's chart. I read it as well. Brian going to look into pricing options for pt's insulin pump. 5 min    Date:  Aug 11 2020  3:25PM     Creator: ana luisa  cont... called and talked to pharmacist who said pt needed Accu-Check Guide Test Strips ordered. I sent over this prescription. I then called pt back and told him i sent over rx. I asked if he called Chase yet, he said no. I offered to do it for him, he said no. I gave him the number and told him to call today please because it was very important. He agreed. I then called Galina Torres and got his next appt (he didnt have one scheduled yet.) It is now on 9/8 2:45 pm. I called pt back with this info. He verbalized understanding. He agreed to reach out when he made his urology appt. No other acute needs. 50 min    Date: Aug 11 2020  3:23PM     Creator: ana luisa  Pt emailed me blood sugar and BP log. BP looks much better. However, his blood sugar is still terribly controlled. He was supposed to see Galina Torres recently. I will ask about that. Log scanned into chart and send to PCP. Pt missed his Dr Stone appt and cystoscopy. He has not rescheduled and the office has been trying to reach him, per ongoing task. Pt has a cardio appt of [facility] type at [facility]. I called cardio office and clarified- it is a stress test. Pt also has an appt with Kiley that same day. Not sure if he will have enough time to make both. I will offer rescheduling. He has two Kun appts, i will only make one appt and cancel the other 2. He sees cardio in Sept as well. I called pt. He agreed to call urology for f/u appt. He said he had the number. He said he saw Brian on 7/29 but not sure when/if he sees her next. He also needs a refill on test strips but not sure what type to order. The Accu-Check Irina plus were too big. I agreed to call pharmacy and ask what specific type he needed ordered. I                   Electronically Signed by: Mavis Miranda RN -Author on August 26, 2020 10:12:33 AM

## 2021-05-10 NOTE — OUTREACH NOTE
Quick Note      Patient Name: Aleksandar Marks   Patient ID: [id number]   Sex: Male   YOB: 1945    Primary Care Provider: Kun DUARTE   Referring Provider: Kun DUARTE    Visit Date: June 29, 2020    Provider: GERALD Dodson   Location: [facility]   Location Address: [address] [place] [address], [address]  [id number]   Location Phone: [phone]          History Of Present Illness     8.6 > 8.9 in the last couple months. prior to contacting pt, i consulted Kun to see if the B12 orders were in and if pt doing weekly or monthly injections. He said pt's b12 was almost as low as it was when they started months ago, so it wouldnt be a bad idea to start over- 1 a week x4 weeks then once a month. pt had emailed me saying his Galina Torres conversation went well but he was not Type ONE DM. I told Kun who said it was likely due to such poor control of his DM. i emailed pt all of the B12 and A1C info. i stressed the importance of getting back on track with a good diet and portions in moderation. he was on track to decreasing his A1C but now it is raising. 10 min    Date: Jun 17 2020  1:59PM     Creator: ana luisa  cont... that if he is anemic after today's labs, he has to get scope. i will f/u with pt tomorrow and when labs are in. 7 min    Date: Jun 17 2020  1:59PM     Creator: ana luisa  pt presented to [facility] office for appt today and saw Kun Cao. After appt, Kun called to give me an update. He said pt misunderstood me when i told him to increase his lisinopril to 30mg daily- he thought he was only supposed to take the higher dose on days his systolic BP was 140 or higher. Kun educated pt to start on the 30mg daily. Pt then told Kun that he was out of Atenolol. after looking at his HR, Kun thought about taking pt off the med, then pt corrected himself- he is actually out of metformin (hence why his sugar is so high.) Kun told pt to make sure he contacted me or the office when he  needed refills from now on. pt is to see Galina Vieira (diabetes coordinator and NP) today. Kun is going to let Galina handle his sugar meds. pt did not have hearing aids with him today so Kun is unsure what all pt really took away from appt. Pt will see urology and cardiology in July. Pt missed his GI phone appt to talk about colonoscopy. pt is embarrassed and doesnt want a scope. pt is anemic. Kun told pt    Date: Jun 17 2020  8:37AM     Creator: ana luisa  pt emailed me a BP log with recent BPs since increasing Lisinopril to 30mg daily. systolic BPs still ranging between 130-140s. log scanned into chart and PCP notified to review it. 4 min    Date: Jun 16 2020 11:11AM     Creator: ana luisa Stone's office called with an appt for pt. July 6, 1345. i emailed pt with this info. 5 min    Date: Jun 12 2020 10:16AM     Creator: ana luisa  pt emailed me asking to remake his urology appt because when he called to make it the staff required a referral and other medical info from us. i called Dr Ontiveros's office and spoke to Roland who said Dr Ontiveros is recovering from open heart surgery and wont be in until mid-late July. i then called [facility] surigal specialist and after being on hold, i talked to staff about an appt. they transferred me to the MA to work pt in. Chase is the on-call doctor to work pts into her schedule but she is not in office today. staff (Pari) took my number and pts info and will consult Dr Stone and then get back to me. i emailed pt back with this info. PCP updated. 14 min    Date: Jun 8 2020 12:52PM     Creator: ana luisa  cont... small changes first- like portion control. this way he wasnt comepletely cutting out the cards/sugars and shocking his body and making it hard to stick to a good diet. he verbalized understanding. he said his avg systolic BP was 135+. after receiving [facility] log i discussed with Kun Cao who increased Lisinopril to 30mg daily. he is to keep mkaing the BP log and we will  "discuss his BP and blood sugar at next Kun appt (which i made for pt on 6/17). He also sees Galina Torres that day as well. pt said his dizziness has gone away for the most part. i still encouraged him to make his cardio appt. he a greed. he is still on 120 units of Toujeo every AM. we reconciled his entire med list and udpated it. i ordered the new dose of lisinopril for Kun. no other acute issues noted. 65 min    Date: Jun 8 2020 12:48PM     Creator: ana luisa  6/5/2020: prior to call, i reviewed chart, referrals, appts. pt had missed four appts- Kun Cao (PCP), Dr Ovalle, Dr Ontiveros, and [facility]. Pt was seeing cardio for dizziness, Yamile to discuss colonoscopy, Kun for general diabetes f/u, and Weston for bladder and right kidney mass. Pt had an A1C drawn at [facility] for Galina Torres APRN two days ago and it was 8.6. this is down from 9.1 earlier this year. it is not \"normal\" but it is improving. i called pt for monthly ccm call. he stated he was doing well overall. we reviewed all of his missed appts and i gave him the phone number, address, and info to all the offices. he agreed to make appts for each of them. i asked for recent blood sugar and BP log. he agreed to send it to me. he asked for A1C from labs. i told him it was better but needed improvement. he said he had made soem changes but clearly needed to make more. when i asked him what he had changed (diet, exercise..) he said \"nothing really.\" I reeducated on [facility] importance of making  \">[facility]     TaskID: [id number]    Task Subject: [facility] June 2020    Task Comments:    Date: Jun 23 2020 10:26AM     Creator: ana luisa  pt emailed me back stating his cardio appt was 7/21. He sent BP log too. much better readings since he started the lisinopril 30mg daily. systolic BPs in 110-130s. Kun Cao made aware. scanned into chart. 6 min    Date: Jun 22 2020  2:59PM     Creator: ana luisa  Pt emailed back saying \"OK\" to my last email. i asked for " some BP readings since he increased med. Also, he was responsible for making his cardio appt so i asked when that was. 3 min    Date: Jun 19 2020 12:56PM     Creator: ana luisa  pt's lab returned- his b12 is low, he needs to come in for injections, and his A1C had increased back to 8.9 He has gone from 9.1 > 8.6 > 8.9 in the last couple months. prior to contacting pt, i consulted Kun to see if the B12 orders were in and if pt doing weekly or monthly injections. He said pt's b12 was almost as low as it was when they started months ago, so it wouldnt be a bad idea to start over- 1 a week x4 weeks then once a month. pt had emailed me saying his Galinasoledad Devinehenrry conversation went well but he was not Type ONE DM. I told Kun who said it was likely due to such poor control of his DM. i emailed pt all of the B12 and A1C info. i stressed the importance of getting back on track with a good diet and portions in moderation. he was on track to decreasing his A1C but now it is raising. 10 min    Date: Jun 17 2020  1:59PM     Creator: ana luisa  cont... that if he is anemic after today's labs, he has to get scope. i will f/u with pt tomorrow and when labs are in. 7 min    Date: Jun 17 2020  1:59PM     Creator: ana luisa  pt presented to [facility] office for appt today and saw Kun Kiley. After appt, Kun called to give me an update. He said pt misunderstood me when i told him to increase his lisinopril to 30mg daily- he thought he was only supposed to take the higher dose on days his systolic BP was 140 or higher. Kun educated pt to start on the 30mg daily. Pt then told Kun that he was out of Atenolol. after looking at his HR, Kun thought about taking pt off the med, then pt corrected himself- he is actually out of metformin (hence why his sugar is so high.) Kun told pt to make sure he contacted me or the office when he needed refills from now on. pt is to see Galina Vieira (diabetes coordinator and NP) today. Kun is going to let  Galina handle his sugar meds. pt did not have hearing aids with him today so Kun is unsure what all pt really took away from appt. Pt will see urology and cardiology in July. Pt missed his GI phone appt to talk about colonoscopy. pt is embarrassed and doesnt want a scope. pt is anemic. Kun told pt    Date: Jun 17 2020  8:37AM     Creator: ana luisa  pt emailed me a BP log with recent BPs since increasing Lisinopril to 30mg daily. systolic BPs still ranging between 130-140s. log scanned into chart and PCP notified to review it. 4 min    Date: Jun 16 2020 11:11AM     Creator: ana luisa Stone's office called with an appt for pt. July 6, 1345. i emailed pt with this info. 5 min    Date: Jun 12 2020 10:16AM     Creator: ana luisa  pt emailed me asking to remake his urology appt because when he called to make it the staff required a referral and other medical info from us. i called Dr Ontiveros's office and spoke to Roland who said Dr Ontiveros is recovering from open heart surgery and wont be in until mid-late July. i then called [facility] sural specialist and after being on hold, i talked to staff about an appt. they transferred me to the MA to work pt in. Chase is the on-call doctor to work pts into her schedule but she is not in office today. staff (Pari) took my number and pts info and will consult Dr Stone and then get back to me. i emailed pt back with this info. PCP updated. 14 min    Date: Jun 8 2020 12:52PM     Creator: ana luisa  cont... small changes first- like portion control. this way he wasnt comepletely cutting out the cards/sugars and shocking his body and making it hard to stick to a good diet. he verbalized understanding. he said his avg systolic BP was 135+. after receiving [facility] log i discussed with Kun Cao who increased Lisinopril to 30mg daily. he is to keep mkaing the BP log and we will discuss his BP and blood sugar at next Kun appt (which i made for pt on 6/17). He also sees Galina Torres that day  "as well. pt said his dizziness has gone away for the most part. i still encouraged him to make his cardio appt. he a greed. he is still on 120 units of Toujeo every AM. we reconciled his entire med list and udpated it. i ordered the new dose of lisinopril for Kun. no other acute issues noted. 65 min    Date: Jun 8 2020 12:48PM     Creator: ana luisa  6/5/2020: prior to call, i reviewed chart, referrals, appts. pt had missed four appts- Kun Cao (PCP), Dr Ovalle, Dr Ontiveros, and [facility]. Pt was seeing cardio for dizziness, Yamile to discuss colonoscopy, Kun for general diabetes f/u, and Weston for bladder and right kidney mass. Pt had an A1C drawn at [facility] for Galina DUARTE two days ago and it was 8.6. this is down from 9.1 earlier this year. it is not \"normal\" but it is improving. i called pt for monthly ccm call. he stated he was doing well overall. we reviewed all of his missed appts and i gave him the phone number, address, and info to all the offices. he agreed to make appts for each of them. i asked for recent blood sugar and BP log. he agreed to send it to me. he asked for A1C from labs. i told him it was better but needed improvement. he said he had made soem changes but clearly needed to make more. when i asked him what he had changed (diet, exercise..) he said \"nothing really.\" I reeducated on [facility] importance of making                   Electronically Signed by: Mavis Miranda RN -Author on June 29, 2020 02:05:58 PM  "

## 2021-05-10 NOTE — OUTREACH NOTE
"   Quick Note      Patient Name: Aleksandar Marks   Patient ID: [id number]   Sex: Male   YOB: 1945    Primary Care Provider: Kun DUARTE   Referring Provider: Kun DUARTE    Visit Date: August 26, 2020    Provider: GERALD Dodson   Location: [facility]   Location Address: [address] [place] [address], [address]  [id number]   Location Phone: [phone]          History Of Present Illness  CCM Comprehensive Care Plan    This Chronic Medical Management Care Plan for Aleksandar Marks, 74 year old /White male, has been monitored and managed for the month of August. A cumulative time of 70 minutes was spent on this patient record, including phone call with patient, phone call with other providers, phone call with pharmacist, and chart review.   Regarding the patient's diagnoses Diabetes, Shortness of Breath, Sinus trouble, and Skin Disease, the following items were adressed: medical records, medications, and referrals to community service providers and any changes can be found within the plan section of the note. A detailed listing of time spent for chronic care management has been scanned into the patient's electronic record. Current medications include: Accu-Check Guide Test Strips, aspirin 81 mg oral tablet,delayed release (DR/EC), atenolol 25 mg oral tablet, cetirizine 10 mg oral tablet, cyanocobalamin (vitamin B-12) 1,000 mcg/mL injection solution, fluticasone propionate 50 mcg/actuation nasal spray,suspension, hydrochlorothiazide 25 mg oral tablet, lisinopril 40 mg oral tablet, Novolog U-100 Insulin aspart 100 unit/mL subcutaneous solution, pen needle, diabetic 31 gauge x 1/4\" miscellaneous needle, simvastatin 20 mg oral tablet, and Toujeo Max U-300 SoloStar 300 unit/mL (3 mL) subcutaneous insulin pen and the patient is reported to be compliant with medication protocol. Medications are reported to be non-effective in controlling symptoms and changes have been made to the " medication protocol. Regarding these diagnoses, referrals were made to the following provider/s : Dr Stone and Galina DUARTE   The patient was monitored remotely for blood pressure, activity level, and blood glucose for a period of 70 minutes.   This patient's physical needs include: physical healthcare. pt referred (Again) to f/u with Brian and Dr Stone for needs.   Patient's mental support needs are currently being met.   The patient's cognitive support needs are currently being met.   The patient's psychosocial support needs include:, continued support.   This patient's functional needs include: physical healthcare. pt has uncontrolled diabetes, referred back to Brian to discuss insulin pump.   This patient's environmental needs are N/A.           Assessment  · Chronic Care Management (CCM)     V68.89/Z02.89  · Diabetes     250.92  · Overweight     278.02/E66.3      Plan  · Orders  o 2 - CCM:Each additional 20 mins () - V68.89/Z02.89, 250.92, 278.02/E66.3 - 08/11/2020  · Instructions  o Patient's Health Care Goals: fasting blood sugar <120  o Provider's Health Care Goals: get insulin pump  o Patient was provided an electronic copy of care plan  o CCM services were explained and offered and patient has accepted these services.  o Patient has given their written consent to recieve CCM services and understands that this includes the authorization of electronic communication of medical information with other treating providers.  o Patient understands that they may stop CCM services at any time and these changes will be effective at the end of the calendar month and will effectively revocate the agreement of CCM services.  o Patient understands that only one practioner can furnish and be paid for CCM services during one calendar month. Patient also understands that there may be co-payment or deductible fees in association with CCM services.  o Patient will continue with at least monthly follow-up calls  with the Nurse Navigator.  · Associate Tasks  o Task ID [id number] CCM: CCM August 2020            Electronically Signed by: Mavis Miranda RN -Author on August 26, 2020 10:14:14 AM

## 2021-05-10 NOTE — OUTREACH NOTE
Quick Note      Patient Name: Aleksandar Marks   Patient ID: [id number]   Sex: Male   YOB: 1945    Primary Care Provider: Demar DUARTE   Referring Provider: Demar DUARTE    Visit Date: December 31, 2020    Provider: GERALD Chavez   Location: [facility]   Location Address: [address] [place] [address], [address] [place] [address]  [id number]   Location Phone: [phone]          History Of Present Illness     [facility]     TaskID: [id number]    Task Subject: [facility] DEC 2020    Task Comments:    Date: Dec 30 2020  8:24AM     Creator: ana luisa  Pt emailed me back a log of blood pressure. He has the insulin pump now but does not know how to pull numbers off. I replied asking for a general idea of his sugars. I reviewed BP log. Overall, stable. However, a few BPs in 160s. I scanned into chart and sent task to Kiley. I emailed pt back asking if he had any s/s or dizziness with higher BPs. 5 min    Date: Dec 29 2020 10:16AM     Creator: ana luisa  Chart review, no new labs or notes added. No email back from pt (this is his preferred method of contact.) I emailed again. 2 min    Date: Dec 11 2020  2:53PM     Creator: ana luisa  I emailed pt for BP log and asked if he got his insulin pump yet. Chart review- no major changes since i last talked to pt. He had labs done, all fairly normal except A1C increased from 8.4 to 9.6, fasting glucose was 290, Vit D slightly low at 29.8. Pt saw PCP on 12-10. Will f/u in Jan 2021. I looked in [email or web address] and found note from last [facility] visit in Nov 2021. Scanned into chart. Pt has been using his insulin pump. Only 18% of readings are wnl. The other percentage have been above normal blood sugar. Pt now a type one diabetic. BP in office yesterday was wnl at 122/80 , O2 96% room air. He was c/o mild soa with exertion due to copd. 14 min                   Electronically Signed by: Mavis Miranda RN -Author on December 31, 2020 12:09:34 PM

## 2021-05-10 NOTE — OUTREACH NOTE
Quick Note      Patient Name: Aleksandar Marks   Patient ID: [id number]   Sex: Male   YOB: 1945    Primary Care Provider: Demar DUARTE   Referring Provider: Demar DUARTE    Visit Date: January 28, 2021    Provider: GERALD Chavez   Location: [facility]   Location Address: [address] [place] [address], [address]  [id number]   Location Phone: [phone]          History Of Present Illness  CCM Comprehensive Care Plan    This Chronic Medical Management Care Plan for Aleksandar Marks, 75 year old /White male, has been monitored and managed for the month of January. A cumulative time of 21 minutes was spent on this patient record, including phone call with patient, electronic communication via patient portal , electronic communication with primary care provider, and chart review.   Regarding the patient's diagnoses Diabetes, Shortness of Breath, Sinus trouble, and Skin Disease, the following items were adressed: medical records, medications, and referrals to community service providers and any changes can be found within the plan section of the note. A detailed listing of time spent for chronic care management has been scanned into the patient's electronic record. Current medications include: Accu-Chek Irina Control Soln miscellaneous solution, Accu-Chek Irina Plus Meter miscellaneous misc, Accu-Chek Irina Plus test strp miscellaneous strip, Accu-Chek Softclix Lancets miscellaneous misc, albuterol sulfate 90 mcg/actuation inhalation HFA aerosol inhaler, alcohol swabs topical pads, medicated, Anoro Ellipta 62.5-25 mcg/actuation inhalation blister with device, aspirin 81 mg oral tablet,delayed release (DR/EC), atenolol 25 mg oral tablet, cetirizine 10 mg oral tablet, cyanocobalamin (vitamin B-12) 1,000 mcg/mL injection solution, fluticasone propionate 50 mcg/actuation nasal spray,suspension, hydrochlorothiazide 25 mg oral tablet, lisinopril 40 mg oral  "tablet, Novolin R Regular U-100 Insuln 100 unit/mL injection solution, pen needle, diabetic 31 gauge x 1/4\" miscellaneous needle, and simvastatin 20 mg oral tablet and the patient is reported to be compliant with medication protocol. Medications are reported to be effective. Pt referred to [facility]   The patient was monitored remotely for blood pressure, activity level, and blood glucose for a period of 21 minutes.   This patient's physical needs include: physical healthcare and physician referral. [facility]. Pt needs monitoring of BP and blood sugar.   Patient's mental support needs are currently being met.   The patient's cognitive support needs include: continued support. pt briefly forgot he saw Kun on 1/12.   The patient's psychosocial support needs are N/A,   This patient's functional needs include: physical healthcare and physician referral. pt has insulin pump with Galina DUARTE. [facility].   This patient's environmental needs are N/A.           Assessment  · Chronic Care Management (CCM)     V68.89/Z02.89  · Diabetes     250.92  · Shortness of Breath     786.05/R06.02  · Sinus trouble     473.9/J34.9  · Right knee pain     719.46/M25.561  · Joint effusion     719.00/M25.40  · Knee swelling     719.06/M25.469  · Obese     278.00/E66.9  · Hypertension     401.9/I10  · Forgetfulness     780.99/R68.89  · Noncompliance with medication regimen     V15.81/Z91.14      Plan  · Medications  o Medications have been Reconciled  o Transition of Care or Provider Policy  · Instructions  o Patient's Health Care Goals: see ortho for knee pain, less knee pain  o Provider's Health Care Goals: BP wnl, A1C wnl  o Patient was provided an electronic copy of care plan  o CCM services were explained and offered and patient has accepted these services.  o Patient has given their written consent to recieve CCM services and understands that this " includes the authorization of electronic communication of medical information with other treating providers.  o Patient understands that they may stop CCM services at any time and these changes will be effective at the end of the calendar month and will effectively revocate the agreement of CCM services.  o Patient understands that only one practioner can furnish and be paid for CCM services during one calendar month. Patient also understands that there may be co-payment or deductible fees in association with CCM services.  o Patient will continue with at least monthly follow-up calls with the Nurse Navigator.  · Associate Tasks  o Task ID [id number] CCM: CCM Jan 2021            Electronically Signed by: Mavis Miranda RN -Author on January 28, 2021 11:11:14 AM

## 2021-05-13 NOTE — PROGRESS NOTES
Progress Note      Patient Name: Aleksandar Marks   Patient ID: [id number]   Sex: Male   YOB: 1945    Primary Care Provider: Kun DUARTE   Referring Provider: Kun DUARTE    Visit Date: July 21, 2020    Provider: GERALD Dodson   Location: [facility]   Location Address: [address] [place] [address], [address]  [id number]   Location Phone: [phone]          Chief Complaint  · leg swelling, soa      History Of Present Illness  Aleksandar Marks is a 74 year old /White male who presents for evaluation and treatment of:      Patient here for an acute visit of leg swelling.  Patient states that the right leg is worse than the left.  He also complains of numbness in his feet.  He states the swelling is not only in his feet, but in his lower legs as well.  Patient also complains of shortness of breath, but this is only occurring when he is wearing his mask.    To follow-up on his hypertension.  Patient states that when he checks his blood pressure it is usually right after taking his morning medication.  His blood pressures range 130-150/80s.  He denies any chest pain, dizziness, or headaches.    To follow-up with the patient's diabetes.  He states that in the mornings his blood sugars range from 60-120s.  In the evenings his blood sugars are 350-400.  He recently saw GERALD Da Silva for diabetes management.  Patient states that he does exercise by walking up and down stairs 5-6 times a day.       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; Shortness of Breath; Sinus trouble; Skin Disease         Past Surgical History  Back surgery; Colonoscopy; Joint Surgery; Knee surgery         Medication List  aspirin 81 mg oral tablet,delayed release (DR/EC); atenolol 25 mg oral tablet; cetirizine 10 mg oral tablet; cyanocobalamin (vitamin  "B-12) 1,000 mcg/mL injection solution; fluticasone propionate 50 mcg/actuation nasal spray,suspension; lisinopril 40 mg oral tablet; Novolog U-100 Insulin aspart 100 unit/mL subcutaneous solution; pen needle, diabetic 31 gauge x 1/4\" miscellaneous needle; simvastatin 20 mg oral tablet; Toujeo Max U-300 SoloStar 300 unit/mL (3 mL) subcutaneous insulin pen         Allergy List  NO KNOWN DRUG ALLERGIES       Allergies Reconciled  Family Medical History  Stroke; Heart Disease; -Father's Family History Unknown; -Mother's Family History Unknown; Family history of cancer; Family history of stroke; Family history of heart disease         Social History  Alcohol (Light); lives with children; Retired; Tobacco (Former);          Immunizations  Name Date Admin   Influenza    [id number]    Prevnar 13          Review of Systems  · Constitutional  o Denies  o : fever, fatigue, weight loss, weight gain  · HENT  o Denies  o : headaches, lightheadedness  · Cardiovascular  o Admits  o : lower extremity edema  o Denies  o : claudication, chest pressure, palpitations  · Respiratory  o Admits  o : shortness of breath while wearing masks  o Denies  o : wheezing, cough, hemoptysis, dyspnea on exertion  · Gastrointestinal  o Denies  o : nausea, vomiting, diarrhea, constipation, abdominal pain  · Neurologic  o Admits  o : tingling or numbness  o Denies  o : dizziness  · Musculoskeletal  o Denies  o : muscular weakness, muscle cramps  · Endocrine  o Admits  o : polyuria, polydipsia, central obesity  o Denies  o : constipation, cold intolerance, heat intolerance  · Psychiatric  o Denies  o : anxiety, depression      Vitals  Date Time BP Position Site L\R Cuff Size HR RR TEMP (F) WT  HT  BMI kg/m2 BSA m2 O2 Sat HC       07/21/2020 09:58 /78 Sitting    64 - R   242lbs 16oz 5'  8\" 36.95 2.3     07/21/2020 09:58 /68 Sitting    62 - R           07/21/2020 01:30 /84 Sitting    59 - R  98.2 245lbs 0oz 5'  8\" 37.25 2.31 97 %  "         Physical Examination  · Constitutional  o Appearance  o : alert, in no acute distress  · Head and Face  o Head  o :   § Inspection  § : atraumatic, normocephalic  o Face  o :   § Inspection  § : no facial lesions  o HEENT  o : Unremarkable  · Eyes  o Conjunctivae  o : conjunctivae normal  o Sclerae  o : sclerae white  o Pupils and Irises  o : pupils equal and round, pupils reactive to light bilaterally  o Eyelids/Ocular Adnexae  o : eyelid appearance normal  · Ears, Nose, Mouth and Throat  o Ears  o :   § External Ears  § : appearance within normal limits, no lesions present  o Nose  o :   § External Nose  § : appearance normal  o Oral Cavity  o :   § Oral Mucosa  § : oral mucosa normal  § Lips  § : lip appearance normal  § Teeth  § : normal dentition for age  § Gums  § : gums pink, non-swollen, no bleeding present  § Tongue  § : tongue appearance normal  § Palate  § : hard palate normal, soft palate appearance normal  · Neck  o Inspection/Palpation  o : normal appearance, no masses or tenderness, trachea midline  o Range of Motion  o : range of motion within normal limits  · Respiratory  o Respiratory Effort  o : breathing unlabored, no accessory muscle use  o Auscultation of Lungs  o : normal breath sounds  · Cardiovascular  o Heart  o :   § Auscultation of Heart  § : regular rate, normal rhythm, no murmurs present  o Peripheral Vascular System  o :   § Extremities  § : 3+ edema present right leg, 1 + edema present left leg, no cyanosis, no distal hair loss, normal capillary refill  · Gastrointestinal  o Abdominal Examination  o : abdomen nontender to palpation, normal bowel sounds, tone normal without rigidity or guarding, no masses present  o Liver and spleen  o : no hepatomegaly present  · Lymphatic  o Neck  o : no lymphadenopathy   o Supraclavicular Nodes  o : no supraclavicular nodes          Assessment  · Diabetes mellitus, type 2     250.00/E11.9  Will be following up with Galina Torres for his  diabetes  · Essential hypertension     401.9/I10  · Leg swelling     729.81/M79.89  3+ pitting edema in the right leg 1+ pitting edema left leg. He saw Dr. Dominique earlier today. He is scheduled for an echocardiogram and Lexiscan stress test. Reports that Dr. Dominique was going to prescribe him a diuretic. His office note is not available yet.    Problems Reconciled  Plan  · Orders  o ACO-39: Current medications updated and reviewed () - - 07/21/2020  · Medications  o Medications have been Reconciled  o Transition of Care or Provider Policy  · Instructions  o Continue blood sugar monitoring daily and record. Bring your log to office visits. Call the office for readings below 70 and above 250 or any complications.  o Daily foot care. Avoid walking barefoot. Annual Dilated Eye Exam.  o Discussed with patient blood pressure monitoring, hemoglobin A1C levels need to be below 7.0, and LDL (Lipid) goals below 70.  o Patient advised to monitor blood pressure (B/P) at home and journal readings. Patient informed that a B/P reading at home of more than 130/80 is considered hypertension. For readings greater otjb630/90 or higher patient is advised to follow up in the office with readings for management. Patient advised to limit sodium intake.  o Patient was educated/instructed on their diagnosis, treatment and medications prior to discharge from the clinic today.  · Disposition  o Call or Return if symptoms worsen or persist.  o f/u 1 month            Electronically Signed by: GERALD Dodson -Author on July 21, 2020 02:50:48 PM

## 2021-05-13 NOTE — PROGRESS NOTES
Progress Note      Patient Name: Aleksandar Marks   Patient ID: [id number]   Sex: Male   YOB: 1945    Primary Care Provider: Kun DUARTE   Referring Provider: Kun DUARTE    Visit Date: June 17, 2020    Provider: GERALD Dodson   Location: [facility]   Location Address: [address] [place] [address], [address]  [id number]   Location Phone: [phone]          Chief Complaint  · F/U      History Of Present Illness  Aleksandar Marks is a 74 year old /White male who presents for evaluation and treatment of:      Presents today for follow-up on type 2 diabetes, hypertension, hyperlipidemia, anemia, vitamin B12 deficiency.  Since the coronavirus pandemic he has missed follow-up appointment with me, urology, cardiology, and a phone consult with gastroenterology for colonoscopy.  He has a follow-up appointment today with Galina DUARTE for his diabetes.     He states he ran out of his metformin and montelukast couple months ago and has not refilled them.  He reports his blood sugars were high.  Blood pressure log was reviewed.  SBP at home is between [id number].  He was supposed to increase his lisinopril to 30 mg daily.  He misunderstood and was only taking 20 mg daily and a 30 mg tablet if his blood pressure was greater than 140.  Denies chest pain, syncope, palpitations.  Discussed with patient to take lisinopril 30 mg tablet.    He has a follow-up appointment with urology in July for abnormal renal ultrasound.  There is debris's versus polyp possible mural mass in the base of the bladder.    CT scan from February 2020 showed a lung nodule 9 mm in the right lower lobe.  Recommended a one-year follow-up chest CT.  CT showed renal lesions and the renal ultrasound showed benign simple cyst.    CBC from December showed anemia.  Iron profile was within normal limits.  Vitamin B12 was low.  He has been receiving vitamin B12 injections.  He has missed his B12 injections for the past  "couple months during the coronavirus pandemic.       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; Shortness of Breath; Sinus trouble; Skin Disease         Past Surgical History  Back surgery; Colonoscopy; Joint Surgery; Knee surgery         Medication List  aspirin 81 mg oral tablet,delayed release (DR/EC); atenolol 25 mg oral tablet; cetirizine 10 mg oral tablet; cyanocobalamin (vitamin B-12) 1,000 mcg/mL injection solution; fluticasone propionate 50 mcg/actuation nasal spray,suspension; lisinopril 30 mg oral tablet; metformin 500 mg oral tablet extended release 24 hr; Novolog U-100 Insulin aspart 100 unit/mL subcutaneous solution; pen needle, diabetic 31 gauge x 1/4\" miscellaneous needle; simvastatin 20 mg oral tablet; Toujeo Max U-300 SoloStar 300 unit/mL (3 mL) subcutaneous insulin pen         Allergy List  NO KNOWN DRUG ALLERGIES       Allergies Reconciled  Family Medical History  Stroke; Heart Disease; Family history of cancer; Family history of stroke; Family history of heart disease         Social History  Alcohol (Light); lives with children; Retired; Tobacco (Former);          Immunizations  Name Date Admin   Influenza    [id number]    Prevnar 13          Review of Systems  · Constitutional  o Denies  o : fatigue, fever, chills, body aches, night sweats  · Eyes  o Denies  o : double vision, blurred vision, peripheral vision changes, changes in vision  · HENT  o Denies  o : headaches, vertigo, lightheadedness, recent head injury  · Cardiovascular  o Admits  o : lower extremity edema  o Denies  o : chest pain, irregular heart beats, rapid heart rate, dyspnea on exertion  · Respiratory  o Denies  o : shortness of breath, wheezing, cough  · Gastrointestinal  o Denies  o : nausea, vomiting, diarrhea, constipation, abdominal pain  · Genitourinary  o Denies  o : urgency, " "frequency, dysuria, urinary retention, decreased stream  · Neurologic  o Admits  o : tingling or numbness  o Denies  o : altered mental status, seizures, tremors  · Endocrine  o Admits  o : polyuria, central obesity  o Denies  o : polydipsia, cold intolerance, heat intolerance      Vitals  Date Time BP Position Site L\R Cuff Size HR RR TEMP (F) WT  HT  BMI kg/m2 BSA m2 O2 Sat        06/17/2020 10:42 /76 Sitting    61 - R  97.3 245lbs 6oz 5'  8\" 37.31 2.31 99 %          Physical Examination  · Constitutional  o Appearance  o : obese, alert, in no acute distress  · Head and Face  o Head  o :   § Inspection  § : atraumatic, normocephalic  o Face  o :   § Inspection  § : no facial lesions  o HEENT  o : Unremarkable  · Eyes  o Conjunctivae  o : conjunctivae normal  o Sclerae  o : sclerae white  o Pupils and Irises  o : pupils equal and round, pupils reactive to light bilaterally  o Eyelids/Ocular Adnexae  o : eyelid appearance normal  · Ears, Nose, Mouth and Throat  o Ears  o :   § External Ears  § : appearance within normal limits, no lesions present  o Nose  o :   § External Nose  § : appearance normal  o Oral Cavity  o :   § Oral Mucosa  § : oral mucosa normal  § Lips  § : lip appearance normal  § Teeth  § : normal dentition for age  § Gums  § : gums pink, non-swollen, no bleeding present  § Tongue  § : tongue appearance normal  § Palate  § : hard palate normal, soft palate appearance normal  · Neck  o Inspection/Palpation  o : normal appearance, no masses or tenderness, trachea midline  o Thyroid  o : gland size normal, nontender, no nodules or masses present on palpation  · Respiratory  o Respiratory Effort  o : breathing unlabored  o Auscultation of Lungs  o : normal breath sounds  · Cardiovascular  o Heart  o :   § Auscultation of Heart  § : regular rate, normal rhythm, no murmurs present  o Peripheral Vascular System  o :   § Extremities  § : no edema  · Lymphatic  o Neck  o : no lymphadenopathy "   o Supraclavicular Nodes  o : no supraclavicular nodes          Assessment  · Anemia     285.9/D64.9  Check CBC, iron profile, B12. Discussed doing the colonoscopy due to anemia. He is leery of the colonoscopy due to embarrassment and last colonoscopy had a polypectomy which she was charged a higher co-pay.  · Diabetes mellitus, type 2     250.00/E11.9  Follow-up with GERALD Da Silva  · Essential hypertension     401.9/I10  Take lisinopril 30 mg daily with the atenolol  · Hyperlipidemia     272.4/E78.5  Check lipid panel  · B12 deficiency     266.2/E53.8  Check B12    Problems Reconciled  Plan  · Orders  o B12 Folate levels (B12FO) - 285.9/D64.9 - 06/17/2020  o CBC with Auto Diff [facility] (28553) - 285.9/D64.9 - 06/17/2020  o Iron panel (iron, TIBC, transferrin saturation) (20973, 64930, 52056) - 285.9/D64.9 - 06/17/2020  o Ferritin ser/plas (79191) - 285.9/D64.9 - 06/17/2020  o Diabetes 1 Panel (CMP, Lipid, A1c) [facility] (47550, 87601, 19689) - 250.00/E11.9 - 06/17/2020  o CBC with Auto Diff [facility] (24724) - 250.00/E11.9, 266.2/E53.8 - 06/17/2020  o ACO-39: Current medications updated and reviewed () - - 06/17/2020  · Medications  o cetirizine 10 mg oral tablet   SIG: take 1 tablet (10 mg) by oral route once daily for 90 days   DISP: (90) tablets with 1 refills  Refilled on 06/17/2020     o montelukast 10 mg oral tablet   SIG: TAKE 1 TABLET BY MOUTH ONCE DAILY IN THE EVENING   DISP: (30) Each with 0 refills  Discontinued on 06/17/2020     o Medications have been Reconciled  o Transition of Care or Provider Policy  · Instructions  o Continue blood sugar monitoring daily and record. Bring your log to office visits. Call the office for readings below 70 and above 250 or any complications.  o Daily foot care. Avoid walking barefoot. Annual Dilated Eye Exam.  o Discussed with patient blood pressure monitoring, hemoglobin A1C levels need to be below 7.0, and LDL (Lipid) goals below 70.  o Patient advised to monitor blood  pressure (B/P) at home and journal readings. Patient informed that a B/P reading at home of more than 130/80 is considered hypertension. For readings greater xkqo733/90 or higher patient is advised to follow up in the office with readings for management. Patient advised to limit sodium intake.  o Advised that cheeses and other sources of dairy fats, animal fats, fast food, and the extras (candy, pastries, pies, doughnuts and cookies) all contain LDL raising nutrients. Advised to increase fruits, vegetables, whole grains, and to monitor portion sizes.   o Patient was educated/instructed on their diagnosis, treatment and medications prior to discharge from the clinic today.  o Patient instructed to seek medical attention urgently for new or worsening symptoms.  o Call the office with any concerns or questions.  · Disposition  o Call or Return if symptoms worsen or persist.  o f/u 3 months            Electronically Signed by: Kun Cao APRN -Author on June 17, 2020 12:56:54 PM

## 2021-05-13 NOTE — PROGRESS NOTES
Progress Note      Patient Name: Aleksandar Marks   Patient ID: [id number]   Sex: Male   YOB: 1945    Primary Care Provider: Kun DUARTE   Referring Provider: Kun DUARTE    Visit Date: September 10, 2020    Provider: GERALD Dodson   Location: [facility]   Location Address: [address] [place] [address], [address]  [id number]   Location Phone: [phone]          Chief Complaint  · 3 MO F/U      History Of Present Illness  Aleksandar Marks is a 74 year old /White male who presents for evaluation and treatment of:      Today for a 3-month follow-up for type 2 diabetes, hypertension, hyperlipidemia, B12 deficiency, and seasonal allergies.  He is currently seeing GERALD Da Silva for his type 2 diabetes.  He is in the process of getting an insulin pump.    He is seeing Dr. Stone urology for a bladder mass that was seen on CT scan for lung cancer screening then an ultrasound.  He was supposed to follow-up for cystoscopy.  He states his finances are low and he will hold off at this time for cystoscopy due to cost.    Hypertension: Blood pressures well controlled.  BP today in office 128/72.    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; Shortness of Breath; Sinus trouble; Skin Disease         Past Surgical History  Back surgery; Colonoscopy; Joint Surgery; Knee surgery         Medication List  Accu-Chek Irina Control Soln miscellaneous solution; Accu-Chek Irina Plus Meter miscellaneous misc; Accu-Chek Irina Plus test strp miscellaneous strip; Accu-Chek Softclix Lancets miscellaneous misc; alcohol swabs topical pads, medicated; aspirin 81 mg oral tablet,delayed release (DR/EC); atenolol 25  "mg oral tablet; cetirizine 10 mg oral tablet; cyanocobalamin (vitamin B-12) 1,000 mcg/mL injection solution; fluticasone propionate 50 mcg/actuation nasal spray,suspension; Humalog U-100 Insulin 100 unit/mL subcutaneous solution; hydrochlorothiazide 25 mg oral tablet; lisinopril 40 mg oral tablet; pen needle, diabetic 31 gauge x 1/4\" miscellaneous needle; simvastatin 20 mg oral tablet; Toujeo Max U-300 SoloStar 300 unit/mL (3 mL) subcutaneous insulin pen         Allergy List  NO KNOWN DRUG ALLERGIES         Family Medical History  Stroke; Heart Disease; -Father's Family History Unknown; -Mother's Family History Unknown; Family history of cancer; Family history of stroke; Family history of heart disease         Social History  Alcohol (Light); lives with children; Retired; Tobacco (Former);          Immunizations  Name Date Admin   Influenza    [id number]    Prevnar 13          Review of Systems  · Constitutional  o Denies  o : fatigue, fever, chills  · Eyes  o Denies  o : blurred vision, changes in vision  · HENT  o Admits  o : nasal congestion  o Denies  o : headaches, vertigo, lightheadedness  · Cardiovascular  o Denies  o : chest pain, irregular heart beats, rapid heart rate, dyspnea on exertion  · Respiratory  o Denies  o : shortness of breath, wheezing, cough  · Gastrointestinal  o Denies  o : nausea, vomiting, diarrhea, constipation, abdominal pain, blood in stools, melena  · Genitourinary  o Denies  o : frequency, dysuria, hematuria  · Integument  o Denies  o : rash, new skin lesions  · Musculoskeletal  o Denies  o : joint pain, joint swelling, muscle pain  · Endocrine  o Admits  o : central obesity, polyuria  o Denies  o : polydipsia      Vitals  Date Time BP Position Site L\R Cuff Size HR RR TEMP (F) WT  HT  BMI kg/m2 BSA m2 O2 Sat        09/10/2020 10:07 /72 Sitting    71 - R  97.5 254lbs 2oz 5'  8\" 38.64 2.35 97 %          Physical Examination  · Constitutional  o Appearance  o : alert, in " no acute distress  · Head and Face  o Head  o :   § Inspection  § : atraumatic, normocephalic  o Face  o :   § Inspection  § : no facial lesions  o HEENT  o : Unremarkable  · Eyes  o Conjunctivae  o : conjunctivae normal  o Sclerae  o : sclerae white  o Pupils and Irises  o : pupils equal and round, pupils reactive to light bilaterally  o Eyelids/Ocular Adnexae  o : eyelid appearance normal  · Ears, Nose, Mouth and Throat  o Ears  o :   § External Ears  § : appearance within normal limits, no lesions present  o Nose  o :   § External Nose  § : appearance normal  o Oral Cavity  o :   § Oral Mucosa  § : oral mucosa normal  § Lips  § : lip appearance normal  § Teeth  § : normal dentition for age  § Gums  § : gums pink, non-swollen, no bleeding present  § Tongue  § : tongue appearance normal  § Palate  § : hard palate normal, soft palate appearance normal  · Neck  o Inspection/Palpation  o : normal appearance, no masses or tenderness, trachea midline  o Thyroid  o : gland size normal, nontender, no nodules or masses present on palpation  · Respiratory  o Respiratory Effort  o : breathing unlabored  o Auscultation of Lungs  o : normal breath sounds  · Cardiovascular  o Heart  o :   § Auscultation of Heart  § : regular rate, normal rhythm, no murmurs present  o Peripheral Vascular System  o :   § Extremities  § : no edema  · Gastrointestinal  o Abdominal Examination  o : abdomen nontender to palpation, normal bowel sounds, tone normal without rigidity or guarding, no masses present, abdominal obesity present  o Liver and spleen  o : no hepatomegaly present  · Lymphatic  o Neck  o : no lymphadenopathy   o Supraclavicular Nodes  o : no supraclavicular nodes          Assessment  · Annual physical exam     V70.0/Z00.00  · Allergic rhinitis due to allergen     477.9/J30.9  Continue current regimen  · Diabetes mellitus, type 2     250.00/E11.9  In the process of obtaining an insulin pump. He has follow-up appointment with  GERALD Da Silva  · Essential hypertension     401.9/I10  Well-controlled. Continue current regimen of lisinopril 40 mg daily, and atenolol 25 mg daily, and hydrochlorothiazide 25 mg daily  · Hyperlipidemia     272.4/E78.5  Controlled we will check lipid panel. Continue simvastatin 20 mg daily.  · B12 deficiency     266.2/E53.8  Check vitamin B12. Continue B12 injections monthly  · Bladder mass     596.89/N32.89  Discussed with patient need to follow-up with Dr. Stone for cystoscopy to rule out bladder mass.    Problems Reconciled  Plan  · Orders  o ACO - Pt declines to or was not able to provide an Advance Care Plan or name a Surrogate Decision Maker (1124F) - - 09/10/2020  o Hgb A1c [facility] ([id number]) - 250.00/E11.9 - 09/10/2020  o Male Physical Primary Care Panel (CMP, CBC, TSH, Lipid, PSA) [facility] (24233, 53895, 99304, 06678, 50048, ) - 401.9/I10, 272.4/E78.5, V70.0/Z00.00 - 09/10/2020  o IM/SQ - Injection Fee [facility] (28060) - 266.2/E53.8 - 09/10/2020  o ACO-39: Current medications updated and reviewed () - - 09/10/2020  o ACO-18: Positive screen for clinical depression using a standardized tool and a follow-up plan documented () - - 09/10/2020   9 points  o ACO-15: Pneumococcal Vaccine Administered or Previously Received (4040F) - - 09/10/2020  o B12 level (33469) - 266.2/E53.8 - 09/10/2020  o Vitamin B12 Injection () - 266.2/E53.8 - 09/10/2020   Injection - Vitamin B12; Dose: 1.0; Site: Right Deltoid; Route: intramuscular; Date: 09/10/2020 11:10:15; Exp: 06/01/2021; Lot: [id number]; Mfg: [employer] TradeName: cyanocobalamin (vitamin B-12); [place] [facility] [place] Administered By: Araceli Amin CMA; Comment: PT TOLERATED INJ WELL, LEFT OFFICE STABLE  · Medications  o hydrochlorothiazide 25 mg oral tablet   SIG: take 1 tablet (25 mg) by oral route once daily   DISP: (90) tablets with 1 refills  Adjusted on 09/10/2020     o Medications have been Reconciled  o Transition  of Care or Provider Policy  · Instructions  o Reviewed health maintenance flowsheet and updated information. Orders were placed and/or patient's response was documented.  o Continue blood sugar monitoring daily and record. Bring your log to office visits. Call the office for readings below 70 and above 250 or any complications.  o Daily foot care. Avoid walking barefoot. Annual Dilated Eye Exam.  o Discussed with patient blood pressure monitoring, hemoglobin A1C levels need to be below 7.0, and LDL (Lipid) goals below 70.  o Patient advised to monitor blood pressure (B/P) at home and journal readings. Patient informed that a B/P reading at home of more than 130/80 is considered hypertension. For readings greater dwdt918/90 or higher patient is advised to follow up in the office with readings for management. Patient advised to limit sodium intake.  o Advised that cheeses and other sources of dairy fats, animal fats, fast food, and the extras (candy, pastries, pies, doughnuts and cookies) all contain LDL raising nutrients. Advised to increase fruits, vegetables, whole grains, and to monitor portion sizes.   o Patient was educated/instructed on their diagnosis, treatment and medications prior to discharge from the clinic today.  o Patient instructed to seek medical attention urgently for new or worsening symptoms.  o Call the office with any concerns or questions.  · Disposition  o Call or Return if symptoms worsen or persist.  o f/u 3 months            Electronically Signed by: GERALD Dodson -Author on September 15, 2020 06:50:54 AM

## 2021-05-13 NOTE — PROGRESS NOTES
Progress Note      Patient Name: Aleksandar Marks   Patient ID: [id number]   Sex: Male   YOB: 1945    Primary Care Provider: Demar DUARTE   Referring Provider: Demar DUARTE    Visit Date: December 10, 2020    Provider: GERALD Chavez   Location: [facility]   Location Address: [address] [place] [address], [address] [place] [address]  [id number]   Location Phone: [phone]          Chief Complaint  · 3 mo f/u      History Of Present Illness  Aleksandar Marks is a 74 year old /White male who presents for evaluation and treatment of:      Presents today for 3-month follow-up on hypertension, type 2 diabetes, COPD, hyperlipidemia, anemia, vitamin B12 deficiency.  He recently was started on an insulin pump.  Blood pressure log reviewed.  A couple BPs average blood pressure is 120/80.  He had 2 blood pressures documented with a systolic less than 100.  On those 2 days he reports he felt fatigue.  He denies chest pain, syncope, palpitations.  He has some shortness of breath with exertion.  Cardiology indicated echocardiogram and Lexiscan stress test was negative for ischemia.  Shortness of breath is secondary to COPD.  He is due CT scan in February to follow-up on lung nodule.       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; Shortness of Breath; Sinus trouble; Skin Disease         Past Surgical History  Back surgery; Colonoscopy; Joint Surgery; Knee surgery         Medication List  Accu-Chek Irina Control Soln miscellaneous solution; Accu-Chek Irina Plus Meter miscellaneous misc; Accu-Chek Irina Plus test strp miscellaneous strip; Accu-Chek Softclix Lancets miscellaneous misc; alcohol swabs topical pads, medicated; aspirin 81 mg oral tablet,delayed release (DR/EC); atenolol 25 mg oral tablet; cetirizine 10 mg oral tablet;  "cyanocobalamin (vitamin B-12) 1,000 mcg/mL injection solution; fluticasone propionate 50 mcg/actuation nasal spray,suspension; hydrochlorothiazide 25 mg oral tablet; lisinopril 40 mg oral tablet; Novolin R Regular U-100 Insuln 100 unit/mL injection solution; pen needle, diabetic 31 gauge x 1/4\" miscellaneous needle; simvastatin 20 mg oral tablet         Allergy List  NO KNOWN DRUG ALLERGIES         Family Medical History  Stroke; Heart Disease; -Father's Family History Unknown; -Mother's Family History Unknown; Family history of cancer; Family history of stroke; Family history of heart disease         Social History  Alcohol (Light); lives with children; Retired; Tobacco (Former);          Immunizations  Name Date Admin   Influenza 10/18/2019   Rnnkrwsuo68 12/01/2011   Prevnar 13 12/01/2010         Review of Systems  · Constitutional  o Admits  o : fatigue  o Denies  o : fever, chills, body aches  · Eyes  o Denies  o : blurred vision, changes in vision  · HENT  o Denies  o : headaches, vertigo, lightheadedness, nasal congestion  · Cardiovascular  o Admits  o : dyspnea on exertion  o Denies  o : chest pain, irregular heart beats, rapid heart rate, lower extremity edema  · Respiratory  o Admits  o : shortness of breath  o Denies  o : wheezing, cough  · Gastrointestinal  o Denies  o : nausea, vomiting, diarrhea, constipation, abdominal pain, blood in stools, melena  · Genitourinary  o Denies  o : frequency, dysuria, hematuria  · Integument  o Denies  o : rash, new skin lesions  · Neurologic  o Admits  o : muscular weakness  o Denies  o : altered mental status, tingling or numbness, seizures, tremors  · Musculoskeletal  o Denies  o : joint pain, joint swelling, muscle pain  · Endocrine  o Admits  o : central obesity  o Denies  o : polyuria, polydipsia      Vitals  Date Time BP Position Site L\R Cuff Size HR RR TEMP (F) WT  HT  BMI kg/m2 BSA m2 O2 Sat FR L/min FiO2        12/10/2020 02:14 /80 Sitting    75 " "- R  96.7 251lbs 4oz 5'  8\" 38.2 2.34 96 %            Physical Examination  · Constitutional  o Appearance  o : obese, alert, in no acute distress  · Head and Face  o Head  o :   § Inspection  § : atraumatic, normocephalic  o Face  o :   § Inspection  § : no facial lesions  o HEENT  o : Unremarkable  · Eyes  o Conjunctivae  o : conjunctivae normal  o Sclerae  o : sclerae white  o Pupils and Irises  o : pupils equal and round, pupils reactive to light bilaterally  o Eyelids/Ocular Adnexae  o : eyelid appearance normal  · Neck  o Inspection/Palpation  o : normal appearance, no masses or tenderness, trachea midline  o Thyroid  o : gland size normal, nontender, no nodules or masses present on palpation  · Respiratory  o Respiratory Effort  o : breathing unlabored  o Auscultation of Lungs  o : normal breath sounds  · Cardiovascular  o Heart  o :   § Auscultation of Heart  § : regular rate, normal rhythm, no murmurs present  o Peripheral Vascular System  o :   § Extremities  § : no edema  · Gastrointestinal  o Abdominal Examination  o : abdomen nontender to palpation, normal bowel sounds, tone normal without rigidity or guarding, no masses present, abdominal obesity present  o Liver and spleen  o : no hepatomegaly present  · Lymphatic  o Neck  o : no lymphadenopathy   o Supraclavicular Nodes  o : no supraclavicular nodes              Assessment  · Allergic rhinitis due to allergen     477.9/J30.9  Allergies well controlled. Refill Flonase. Continue current regimen  · Anemia     285.9/D64.9  Check iron profile, ferritin, B12, and folate. Give B12 injection today  · COPD (chronic obstructive pulmonary disease)     496/J44.9  Start albuterol rescue inhaler 1 to 2 puffs every 6 hours as needed.  · Diabetes mellitus, type 2     250.00/E11.9  Continue follow-up with GERALD Da Silva and monitoring blood sugars.  · Essential hypertension     401.9/I10  Well-controlled. Hold blood pressure medication if systolic blood " pressures less than 100.  · Vitamin D deficiency     268.9/E55.9  check vitamin D  · B12 deficiency     266.2/E53.8      Plan  · Orders  o B12 Folate levels (B12FO) - 285.9/D64.9 - 12/10/2020  o Iron panel (iron, TIBC, transferrin saturation) (78544, 61680, 51401) - 285.9/D64.9 - 12/10/2020  o Ferritin ser/plas (39634) - 285.9/D64.9 - 12/10/2020  o CMP [facility] (05525) - 250.00/E11.9 - 12/10/2020  o Hgb A1c [facility] (34932) - 250.00/E11.9 - 12/10/2020  o Urine microalbumin (51405) - 250.00/E11.9 - 12/10/2020  o Vitamin D Level (40515) - 268.9/E55.9 - 12/10/2020  o IM - Injection Fee [facility] (47943) - 266.2/E53.8 - 12/10/2020  o ACO-39: Current medications updated and reviewed (1159F, ) - - 12/10/2020  o Vitamin B12 Injection () - 266.2/E53.8 - 12/10/2020   Injection - Vitamin B12; Dose: 1.0; Site: Right Upper Arm; Route: intramuscular; Date: 12/10/2020 15:33:25; Exp: 06/01/2021; Lot: [id number]; Mfg: [employer] TradeName: cyanocobalamin (vitamin B-12); Location: [facility]; Administered By: Araceli Amin CMA; Comment: pt tolerated inj well, left office stable   pt brought in own vial of b12  · Medications  o albuterol sulfate 90 mcg/actuation inhalation HFA aerosol inhaler   SIG: inhale 1 - 2 puffs (90 - 180 mcg) by inhalation route every 6 hours as needed   DISP: (1) Puff with 2 refills  Prescribed on 12/10/2020     o fluticasone propionate 50 mcg/actuation nasal spray,suspension   SIG: USE 2 SPRAYS IN EACH NOSTRIL ONE TIME DAILY   DISP: (1) Spray with 5 refills  Adjusted on 12/10/2020     · Instructions  o Patient has recieved their annual influenza vaccine.  o Pneumococcal vaccine given within the last 5 years.   o Instructed patient when feeling out of breath to use their fast-acting or rescue inhaler.   o GOLD system was utilized to guide therapy for the patient.   o Discussed weight loss with the patient to help improve exercise intolerance and reduce dyspnea.  o Continue blood  sugar monitoring daily and record. Bring your log to office visits. Call the office for readings below 70 and above 250 or any complications.  o Patient advised to monitor blood pressure (B/P) at home and journal readings. Patient informed that a B/P reading at home of more than 130/80 is considered hypertension. For readings greater fkep402/90 or higher patient is advised to follow up in the office with readings for management. Patient advised to limit sodium intake.  o Patient was educated/instructed on their diagnosis, treatment and medications prior to discharge from the clinic today.  o Patient instructed to seek medical attention urgently for new or worsening symptoms.  o Call the office with any concerns or questions.  · Disposition  o Call or Return if symptoms worsen or persist.  o f/u 3 months            Electronically Signed by: GERALD Chavez -Author on December 10, 2020 05:01:02 PM

## 2021-05-13 NOTE — PROGRESS NOTES
"   Progress Note      Patient Name: Aleksandar Marks   Patient ID: [id number]   Sex: Male   YOB: 1945    Primary Care Provider: Demar DUARTE   Referring Provider: Demar DUARTE    Visit Date: September 29, 2020    Provider: Suhail Dominique MD   Location: [facility]   Location Address: [address] [place] [address], [facility]   BRANDON Hurtado  [id number]   Location Phone: [phone]          Chief Complaint  · Hypertension   · Shortness of breath   · Pedal edema       History Of Present Illness  Aleksandar Marks is a 74 year old /White male with a history of shortness of breath. Recent stress test was negative. Echocardiogram showed normal left ventricular systolic function. Blood pressure is running high. His Lisinopril has been increased recently.   CURRENT MEDICATIONS: include HCTZ 25 mg daily; Atenolol 25 mg b.i.d.; Simvastatin 20 mg daily; Lisinopril 40 mg daily; Toujeo; Novolin; Fluticasone; Pioglitazone 45 mg daily; Cetirizine 10 mg daily. The dosage and frequency of the medications were reviewed with the patient.   PAST MEDICAL HISTORY: Arthritis; diabetes mellitus, insulin dependent, controlled; hyperlipidemia; hypertension.   PSYCHOSOCIAL HISTORY: He never used alcohol. He previously used tobacco but quit.       Review of Systems  · Cardiovascular  o Admits  o : swelling (feet, ankles, hands)  o Denies  o : palpitations (fast, fluttering, or skipping beats), shortness of breath while walking or lying flat, chest pain or angina pectoris   · Respiratory  o Denies  o : chronic or frequent cough      Vitals  Date Time BP Position Site L\R Cuff Size HR RR TEMP (F) WT  HT  BMI kg/m2 BSA m2 O2 Sat FR L/min FiO2 HC       09/29/2020 11:27 /84 Sitting    64 - R   248lbs 0oz 5'  8\" 37.71 2.32       09/29/2020 11:27 /76 Sitting    66 - R                   Physical Examination  · Constitutional  o Appearance  o : Awake, alert, cooperative, " pleasant.  · Respiratory  o Inspection of Chest  o : No chest wall deformities, moving equal.  o Auscultation of Lungs  o : Good air entry with vesicular breath sounds.  · Cardiovascular  o Heart  o :   § Auscultation of Heart  § : S1 and S2 regular. No S3. No S4. No murmurs.  o Peripheral Vascular System  o :   § Extremities  § : Peripheral pulses were well felt. No edema. No cyanosis.  · Gastrointestinal  o Abdominal Examination  o : No masses or organomegaly noted.          Assessment     ASSESSMENT AND PLAN:    1.  Shortness of breath/COPD:  I discussed with the patient the results of his echocardiogram and lexiscan        stress test, which was negative for ischemia.  Shortness of breath is probably secondary to COPD.  2.  Essential hypertension controlled:  Continue current dose of Lisinopril.  Monitor his blood pressure regularly.       Adjust the dose of anti-hypertensive.  3.  See me back in 6 months.    Suhail Dominique MD, [facility]  CHARLES/charli           This note was transcribed by Radha Mason.  charli/CHARLES  The above service was transcribed by Radha Mason, and I attest to the accuracy of the note.  CHARLES                 Electronically Signed by: Radha Mason-, -Author on October 5, 2020 08:07:28 AM  Electronically Co-signed by: Suhail Dominique MD -Reviewer on October 7, 2020 11:39:45 AM

## 2021-05-14 NOTE — PROGRESS NOTES
Progress Note      Patient Name: Aleksandar Marks   Patient ID: [id number]   Sex: Male   YOB: 1945    Primary Care Provider: Demar DUARTE   Referring Provider: Demar DUARTE    Visit Date: April 29, 2021    Provider: Deni Holland PA-C   Location: [facility]   Location Address: [address] [place] [address]  [id number]   Location Phone: [phone]          Chief Complaint  · Right knee pain   · Left hip pain       History Of Present Illness  Aleksandar Marks is a 75 year old /White male who presents today to [facility]. Patient presents for follow-up evaluation of her right knee pain, right knee osteoarthritis and also complaining of left hip pain. Patient was last seen by Dr. Gillis on June 28, 2021, he had right knee aspiration and injection, he states that this did help his right knee. Patient states right knee is doing well today but there are bad days where he has worse pain with some swelling. Patient denies swelling of the knee today, states it does not feel like it did when he had his last aspiration. Patient recently saw his primary care physician complaining of left hip pain, he states he has a left hip pain for about 6 to 7 years, they did a x-ray.       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; Shortness of Breath; Sinus trouble; Skin Disease         Past Surgical History  Back surgery; Colonoscopy; Joint Surgery; Knee surgery         Medication List  Accu-Chek Irina Control Soln miscellaneous solution; Accu-Chek Irina Plus Meter miscellaneous misc; Accu-Chek Irina Plus test strp miscellaneous strip; Accu-Chek Softclix Lancets miscellaneous misc; albuterol sulfate 90 mcg/actuation inhalation HFA aerosol inhaler; alcohol swabs topical pads, medicated; aspirin 81 mg oral tablet,delayed  "release (DR/EC); atenolol 25 mg oral tablet; cetirizine 10 mg oral tablet; cyanocobalamin (vitamin B-12) 1,000 mcg/mL injection solution; fluticasone propionate 50 mcg/actuation nasal spray,suspension; hydrochlorothiazide 25 mg oral tablet; lisinopril 40 mg oral tablet; Novolin R Regular U-100 Insuln 100 unit/mL injection solution; pen needle, diabetic 31 gauge x 1/4\" miscellaneous needle; simvastatin 20 mg oral tablet; Stiolto Respimat 2.5-2.5 mcg/actuation inhalation mist         Allergy List  NO KNOWN DRUG ALLERGIES       Allergies Reconciled  Family Medical History  Stroke; Heart Disease; -Father's Family History Unknown; -Mother's Family History Unknown; Family history of cancer; Family history of stroke; Family history of heart disease         Social History  Alcohol (Light); lives with children; Retired; Tobacco (Former);          Immunizations  Name Date Admin   Influenza 10/18/2019   Tirikgiwi81 12/01/2011   Prevnar 13 12/01/2010         Review of Systems  · Constitutional  o Denies  o : fever, chills, weight loss  · Cardiovascular  o Denies  o : chest pain, shortness of breath  · Gastrointestinal  o Denies  o : liver disease, heartburn, nausea, blood in stools  · Genitourinary  o Denies  o : painful urination, blood in urine  · Integument  o Denies  o : rash, itching  · Neurologic  o Denies  o : headache, weakness, loss of consciousness  · Musculoskeletal  o Denies  o : painful, swollen joints  · Psychiatric  o Denies  o : drug/alcohol addiction, anxiety, depression      Vitals  Date Time BP Position Site L\R Cuff Size HR RR TEMP (F) WT  HT  BMI kg/m2 BSA m2 O2 Sat FR L/min FiO2        04/29/2021 01:29 PM      77 - R   242lbs 6oz 5'  8.5\" 36.32 2.31 98 %            Physical Examination  · Constitutional  o Appearance  o : well developed, well-nourished, no obvious deformities present  · Head and Face  o Head  o :   § Inspection  § : normocephalic  o Face  o :   § Inspection  § : no facial " lesions  · Eyes  o Conjunctivae  o : conjunctivae normal  o Sclerae  o : sclerae white  · Ears, Nose, Mouth and Throat  o Ears  o :   § External Ears  § : appearance within normal limits  § Hearing  § : intact  o Nose  o :   § External Nose  § : appearance normal  · Neck  o Inspection/Palpation  o : normal appearance  o Range of Motion  o : full range of motion  · Respiratory  o Respiratory Effort  o : breathing unlabored  o Inspection of Chest  o : normal appearance  o Auscultation of Lungs  o : no audible wheezing or rales  · Cardiovascular  o Heart  o : regular rate  · Gastrointestinal  o Abdominal Examination  o : soft and non-tender  · Skin and Subcutaneous Tissue  o General Inspection  o : intact, no rashes  · Psychiatric  o General  o : Alert and oriented x3  o Judgement and Insight  o : judgment and insight intact  o Mood and Affect  o : mood normal, affect appropriate  · Left Hip  o Inspection  o : Nontender palpation, range of motion intact, neurovascular intact.  · Right Knee  o Inspection  o : Skin is intact, no effusion, Non tender. 0-120 ROM. Crepitus with ROM. Stable to varus and valgus stress. Stable to anterior and posterior drawer. Negative Lachman's. Negative Pradeep's   · Imaging  o Imaging  o : X-ray left hip, 4/27/2021, conclusion: No acute disease. [facility] X-ray 1/2021: Arthritic change of the right knee           Assessment  · Primary osteoarthritis of right knee     715.16/M17.11  · Left Pain: Hip     719.45/M25.559  · Right knee pain, unspecified chronicity     719.46/M25.561      Plan  · Medications  o Medications have been Reconciled  o Transition of Care or Provider Policy  · Instructions  o Reviewed the patient's Past Medical, Social, and Family history as well as the ROS at today's visit, no changes.  o Call or return if worsening symptoms.  o Follow up in 1 week.  o Reviewed x-rays with the patient of left hip and right knee, patient was advised of diagnosis and treatment options,  patient would like to focus on his right knee, he refused steroid injection today, he is exploring the possibility of discussing right total knee arthroplasty, he has pricing concerns and has a number he will call for possible costing. Patient would like to follow-up in 1 week to discuss knee replacement with Dr. Gillis.            Electronically Signed by: VAUGHN Hargrove-REECE -Author on April 29, 2021 02:02:56 PM  Electronically Co-signed by: Sin Gillis MD -Reviewer on April 29, 2021 08:30:10 PM

## 2021-05-14 NOTE — PROGRESS NOTES
Progress Note      Patient Name: Aleksandar Marks   Patient ID: [id number]   Sex: Male   YOB: 1945    Primary Care Provider: Demar DUARTE   Referring Provider: Demar DUARTE    Visit Date: February 24, 2021    Provider: GERALD Chavez   Location: [facility]   Location Address: [address] [place] [address], [address] [place] [address]  [id number]   Location Phone: [phone]          Chief Complaint  · 1 MO F/U      History Of Present Illness  Aleksandar Marks is a 75 year old /White male who presents for evaluation and treatment of:      Eliezer today for follow-up for COPD, hypertension, vitamin B12 deficiency, hyperlipidemia..  Blood pressure is well controlled.  124/84.  Denies chest pain, syncope, palpitations.  He had taken the Anoro Ellipta sample.  He reports it had helped his shortness of breath.  He reported the medication was too expensive to get at the pharmacy.  He also reports having difficulty affording the insulin for his insulin pump.  He has a low-dose lung CT scheduled for this week to evaluate lung nodule.       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; Shortness of Breath; Sinus trouble; Skin Disease         Past Surgical History  Back surgery; Colonoscopy; Joint Surgery; Knee surgery         Medication List  Accu-Chek Irina Control Soln miscellaneous solution; Accu-Chek Irina Plus Meter miscellaneous misc; Accu-Chek Irina Plus test strp miscellaneous strip; Accu-Chek Softclix Lancets miscellaneous misc; albuterol sulfate 90 mcg/actuation inhalation HFA aerosol inhaler; alcohol swabs topical pads, medicated; aspirin 81 mg oral tablet,delayed release (DR/EC); atenolol 25 mg oral tablet; cetirizine 10 mg oral tablet; cyanocobalamin (vitamin B-12) 1,000 mcg/mL injection solution; fluticasone propionate 50  "mcg/actuation nasal spray,suspension; hydrochlorothiazide 25 mg oral tablet; lisinopril 40 mg oral tablet; Novolin R Regular U-100 Insuln 100 unit/mL injection solution; pen needle, diabetic 31 gauge x 1/4\" miscellaneous needle; simvastatin 20 mg oral tablet         Allergy List  NO KNOWN DRUG ALLERGIES       Allergies Reconciled  Family Medical History  Stroke; Heart Disease; -Father's Family History Unknown; -Mother's Family History Unknown; Family history of cancer; Family history of stroke; Family history of heart disease         Social History  Alcohol (Light); lives with children; Retired; Tobacco (Former);          Immunizations  Name Date Admin   Influenza 10/18/2019   Tuvasbepv27 12/01/2011   Prevnar 13 12/01/2010         Review of Systems  · Constitutional  o Denies  o : fatigue  · Eyes  o Denies  o : blurred vision, changes in vision  · HENT  o Denies  o : headaches, vertigo, lightheadedness  · Cardiovascular  o Denies  o : chest pain, irregular heart beats, rapid heart rate, dyspnea on exertion  · Respiratory  o Denies  o : shortness of breath, wheezing, cough  · Gastrointestinal  o Denies  o : nausea, vomiting, diarrhea, constipation, abdominal pain, blood in stools, melena  · Genitourinary  o Denies  o : frequency, dysuria, hematuria  · Integument  o Denies  o : rash, new skin lesions  · Musculoskeletal  o Denies  o : joint pain, joint swelling, muscle pain  · Endocrine  o Admits  o : central obesity  o Denies  o : polyuria, polydipsia      Vitals  Date Time BP Position Site L\R Cuff Size HR RR TEMP (F) WT  HT  BMI kg/m2 BSA m2 O2 Sat FR L/min FiO2 HC       02/24/2021 10:21 /84 Sitting    74 - R  97.6 244lbs 4oz 5'  8\" 37.14 2.31 98 %            Physical Examination  · Constitutional  o Appearance  o : obese, alert, in no acute distress  · Head and Face  o Head  o :   § Inspection  § : atraumatic, normocephalic  o Face  o :   § Inspection  § : no facial lesions  o HEENT  o : " Unremarkable  · Eyes  o Conjunctivae  o : conjunctivae normal  o Sclerae  o : sclerae white  o Pupils and Irises  o : pupils equal and round, pupils reactive to light bilaterally  o Eyelids/Ocular Adnexae  o : eyelid appearance normal  · Neck  o Inspection/Palpation  o : normal appearance, no masses or tenderness, trachea midline  o Thyroid  o : gland size normal, nontender, no nodules or masses present on palpation  · Respiratory  o Respiratory Effort  o : breathing unlabored  o Auscultation of Lungs  o : normal breath sounds  · Cardiovascular  o Heart  o :   § Auscultation of Heart  § : regular rate, normal rhythm, no murmurs present  o Peripheral Vascular System  o :   § Extremities  § : no edema  · Gastrointestinal  o Abdominal Examination  o : abdomen nontender to palpation, normal bowel sounds, tone normal without rigidity or guarding, no masses present, abdominal obesity present, abdomen scaphoid upon supine  o Liver and spleen  o : no hepatomegaly present  · Lymphatic  o Neck  o : no lymphadenopathy   o Supraclavicular Nodes  o : no supraclavicular nodes              Assessment  · COPD (chronic obstructive pulmonary disease)     496/J44.9  Switch Anoro Ellipta to Stiolto Respimat  · Diabetes mellitus, type 2     250.00/E11.9  Check hemoglobin A1c next month  · Essential hypertension     401.9/I10  Well-controlled continue current regimen  · Hyperlipidemia     272.4/E78.5  Controlled  · Vitamin B12 deficiency     266.2/E53.8  Continue monthly injections      Plan  · Orders  o ACO - Pt declines to or was not able to provide an Advance Care Plan or name a Surrogate Decision Maker (1124F) - - 02/24/2021  o Hgb A1c [facility] (49621) - 250.00/E11.9 - 03/24/2021  o ACO-15: Pneumococcal Vaccine Administered or Previously Received [facility] (4040F) - - 02/24/2021  o ACO-13: Fall Risk Screening with no falls in past year or only one fall without injury in the past year (1101F) - - 02/24/2021  o ACO-39: Current medications updated  and reviewed (1159F, ) - - 02/24/2021  · Medications  o Stiolto Respimat 2.5-2.5 mcg/actuation inhalation mist   SIG: inhale 2 puffs by inhalation route once daily at the same time each day for 30 days   DISP: (60) Inhaler with 2 refills  Prescribed on 02/24/2021     o albuterol sulfate 90 mcg/actuation inhalation HFA aerosol inhaler   SIG: inhale 1 - 2 puffs (90 - 180 mcg) by inhalation route every 6 hours as needed   DISP: (1) Puff with 2 refills  Refilled on 02/24/2021     o cyanocobalamin (vitamin B-12) 1,000 mcg/mL injection solution   SIG: inject 1 milliliter (1,000 mcg) by intramuscular route once a week x 4 weeks then monthly   DISP: (4) Vial with 2 refills  Refilled on 02/24/2021     o Anoro Ellipta 62.5-25 mcg/actuation inhalation blister with device   SIG: inhale 1 puff by inhalation route once daily at the same time each day   DISP: (1) Inhaler with 3 refills  Discontinued on 02/24/2021     o Medications have been Reconciled  o Transition of Care or Provider Policy  · Instructions  o Continue blood sugar monitoring daily and record. Bring your log to office visits. Call the office for readings below 70 and above 250 or any complications.  o Advised that cheeses and other sources of dairy fats, animal fats, fast food, and the extras (candy, pastries, pies, doughnuts and cookies) all contain LDL raising nutrients. Advised to increase fruits, vegetables, whole grains, and to monitor portion sizes.   o Patient was educated/instructed on their diagnosis, treatment and medications prior to discharge from the clinic today.  o Patient instructed to seek medical attention urgently for new or worsening symptoms.  o Call the office with any concerns or questions.            Electronically Signed by: GERALD Chavez -Author on February 24, 2021 12:11:33 PM

## 2021-05-14 NOTE — PROGRESS NOTES
Progress Note      Patient Name: Aleksandar Marks   Patient ID: [id number]   Sex: Male   YOB: 1945    Primary Care Provider: Demar DUARTE   Referring Provider: Demar DUARTE    Visit Date: April 27, 2021    Provider: GERALD Chavez   [facility]   Location Address: [address] [place] [address], [address] [place] [address]  [id number]   Location Phone: [phone]          Chief Complaint  · 2 MONTH F/U      History Of Present Illness  Aleksandar Marks is a 75 year old /White male who presents for evaluation and treatment of:      Presents today for 2-month follow-up on hypertension, vitamin B12 deficiency, COPD and type 2 diabetes.  He recently saw Galina Torres was checked his A1c was 9.2%.  Blood pressure is fairly controlled per home readings.  BP today in office is slightly elevated 138/72.  Denies chest pain, syncope, palpitations.  He recently reports having left hip pain worsening over the past month.  Pain increases with movement.  He now states that his left hip hurts more than his bilateral knee pain.  He has a follow-up appoint with Dr. Gillis orthopedics later this week.  Denies redness and swelling.       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; Shortness of Breath; Sinus trouble; Skin Disease         Past Surgical History  Back surgery; Colonoscopy; Joint Surgery; Knee surgery         Medication List  Accu-Chek Irina Control Soln miscellaneous solution; Accu-Chek Irina Plus Meter miscellaneous misc; Accu-Chek Irina Plus test strp miscellaneous strip; Accu-Chek Softclix Lancets miscellaneous misc; albuterol sulfate 90 mcg/actuation inhalation HFA aerosol inhaler; alcohol swabs topical pads, medicated; aspirin 81 mg oral tablet,delayed release (DR/EC); atenolol 25 mg oral tablet; cetirizine 10 mg  "oral tablet; cyanocobalamin (vitamin B-12) 1,000 mcg/mL injection solution; fluticasone propionate 50 mcg/actuation nasal spray,suspension; hydrochlorothiazide 25 mg oral tablet; lisinopril 40 mg oral tablet; Novolin R Regular U-100 Insuln 100 unit/mL injection solution; pen needle, diabetic 31 gauge x 1/4\" miscellaneous needle; simvastatin 20 mg oral tablet; Stiolto Respimat 2.5-2.5 mcg/actuation inhalation mist         Allergy List  NO KNOWN DRUG ALLERGIES       Allergies Reconciled  Family Medical History  Stroke; Heart Disease; -Father's Family History Unknown; -Mother's Family History Unknown; Family history of cancer; Family history of stroke; Family history of heart disease         Social History  Alcohol (Light); lives with children; Retired; Tobacco (Former);          Immunizations  Name Date Admin   Influenza 10/18/2019   Unioxmqma86 12/01/2011   Prevnar 13 12/01/2010         Review of Systems  · Constitutional  o Denies  o : fatigue, night sweats  · Eyes  o Denies  o : double vision, blurred vision  · HENT  o Denies  o : vertigo, recent head injury  · Cardiovascular  o Denies  o : chest pain, irregular heart beats  · Respiratory  o Admits  o : shortness of breath  o Denies  o : wheezing, cough  · Gastrointestinal  o Denies  o : nausea, vomiting, diarrhea, constipation, abdominal pain, blood in stools  · Genitourinary  o Denies  o : dysuria, urinary retention  · Integument  o Denies  o : hair growth change, new skin lesions  · Neurologic  o Denies  o : altered mental status, seizures  · Musculoskeletal  o Admits  o : joint pain, hip pain, knee pain  o Denies  o : joint swelling, limitation of motion  · Endocrine  o Denies  o : polyuria, polydipsia, cold intolerance, heat intolerance  · Heme-Lymph  o Denies  o : petechiae, lymph node enlargement or tenderness  · Allergic-Immunologic  o Denies  o : frequent illnesses      Vitals  Date Time BP Position Site L\R Cuff Size HR RR TEMP (F) WT  HT  BMI " "kg/m2 BSA m2 O2 Sat FR L/min FiO2 HC       04/27/2021 11:13 /72 Sitting    76 - R  97.6 236lbs 6oz 5'  8\" 35.94 2.27 94 %            Physical Examination  · Constitutional  o Appearance  o : obese, alert, in no acute distress  · Head and Face  o Head  o :   § Inspection  § : atraumatic, normocephalic  o Face  o :   § Inspection  § : no facial lesions  o HEENT  o : Unremarkable  · Eyes  o Conjunctivae  o : conjunctivae normal  o Sclerae  o : sclerae white  o Pupils and Irises  o : pupils equal and round, pupils reactive to light bilaterally  o Eyelids/Ocular Adnexae  o : eyelid appearance normal  · Ears, Nose, Mouth and Throat  o Ears  o :   § External Ears  § : appearance within normal limits, no lesions present  o Nose  o :   § External Nose  § : appearance normal  o Oral Cavity  o :   § Oral Mucosa  § : oral mucosa normal  § Lips  § : lip appearance normal  § Teeth  § : normal dentition for age  § Gums  § : gums pink, non-swollen, no bleeding present  § Tongue  § : tongue appearance normal  § Palate  § : hard palate normal, soft palate appearance normal  · Neck  o Inspection/Palpation  o : normal appearance, no masses or tenderness, trachea midline  o Thyroid  o : gland size normal, nontender, no nodules or masses present on palpation  · Respiratory  o Respiratory Effort  o : breathing unlabored  o Auscultation of Lungs  o : normal breath sounds  · Cardiovascular  o Heart  o :   § Auscultation of Heart  § : regular rate, normal rhythm, no murmurs present  o Peripheral Vascular System  o :   § Extremities  § : no edema  · Gastrointestinal  o Abdominal Examination  o : abdomen nontender to palpation, normal bowel sounds, tone normal without rigidity or guarding, no masses present, abdominal obesity present, abdomen scaphoid upon supine  o Liver and spleen  o : no hepatomegaly present  · Lymphatic  o Neck  o : no lymphadenopathy   o Supraclavicular Nodes  o : no supraclavicular nodes  · Left " Hip  o Inspection  o : no redness  o Palpation  o : Greater Trochanter nontender, ASIS nontender, AIIS tender to palpation   o Range of Motion  o : flexion 0-90  o Neurovascular  o : Neurovascular intact          Assessment  · COPD (chronic obstructive pulmonary disease)     496/J44.9  COPD is well controlled. Continue current regimen of inhaler  · Diabetes mellitus type 2, uncontrolled, with complications       Type 2 diabetes mellitus with unspecified complications     250.92/E11.8  Type 2 diabetes mellitus with hyperglycemia     250.92/E11.65  Currently on an insulin pump. Follow-up with GERALD Da Silva  · Essential hypertension     401.9/I10  Fairly controlled, continue current regimen  · Left Hip pain     719.45/M25.559  Order x-ray left hip. Consult orthopedics  · Vitamin B12 deficiency     266.2/E53.8  Continuing monthly injections. We will recheck vitamin D 12 levels at next office visit      Plan  · Orders  o ACO-39: Current medications updated and reviewed (1159F, ) - - 04/27/2021  o Hip xray left 2 or more views (includes AP Pelvis) [facility] ([id number]) - - 04/27/2021  o ORTHOPEDIC CONSULTATION (ORTHO) - 719.45/M25.559 - 04/27/2021   he has an appt tomorrow with dr. oconnor  · Instructions  o Patient was educated/instructed on their diagnosis, treatment and medications prior to discharge from the clinic today.  o Patient instructed to seek medical attention urgently for new or worsening symptoms.  o Call the office with any concerns or questions.            Electronically Signed by: GERALD Chavez -Author on April 29, 2021 06:04:34 AM

## 2021-05-14 NOTE — PROGRESS NOTES
Progress Note      Patient Name: Aleksandar Marks   Patient ID: [id number]   Sex: Male   YOB: 1945    Primary Care Provider: Demar DUARTE   Referring Provider: Demar DUARTE    Visit Date: January 12, 2021    Provider: GERALD Chavez   Location: [facility]   Location Address: [address] [place] [address], [address] [place] [address]  [id number]   Location Phone: [phone]          Chief Complaint  · 1 month f/u      History Of Present Illness  Aleksandar Marks is a 75 year old /White male who presents for evaluation and treatment of:      Presents today for a 1 month follow-up for hypertension, COPD, and hyperlipidemia.  He has type 2 diabetes with an insulin pump.  He sees GERALD Da Silva for his diabetes.  He has been experiencing some shortness of breath.  He has been taking his albuterol inhaler couple times a day.  Denies fever, chills, body aches, chest pain, syncope, palpitations.  Blood pressure has been elevated over the past month.  He ran out of hydrochlorothiazide had not refilled his medication.  He also has need of a refill of his cetirizine which she has not been taking for the past 1 to 2 months.    Over the past couple weeks he has been experiencing right knee pain that is been worsening.  Swelling in the right knee with no redness.  States his knee pops.       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; Shortness of Breath; Sinus trouble; Skin Disease         Past Surgical History  Back surgery; Colonoscopy; Joint Surgery; Knee surgery         Medication List  Accu-Chek Irina Control Soln miscellaneous solution; Accu-Chek Irina Plus Meter miscellaneous misc; Accu-Chek Irina Plus test strp miscellaneous strip; Accu-Chek Softclix Lancets miscellaneous misc; albuterol sulfate 90 mcg/actuation inhalation  "HFA aerosol inhaler; alcohol swabs topical pads, medicated; aspirin 81 mg oral tablet,delayed release (DR/EC); atenolol 25 mg oral tablet; cetirizine 10 mg oral tablet; cyanocobalamin (vitamin B-12) 1,000 mcg/mL injection solution; fluticasone propionate 50 mcg/actuation nasal spray,suspension; hydrochlorothiazide 25 mg oral tablet; lisinopril 40 mg oral tablet; Novolin R Regular U-100 Insuln 100 unit/mL injection solution; pen needle, diabetic 31 gauge x 1/4\" miscellaneous needle; simvastatin 20 mg oral tablet         Allergy List  NO KNOWN DRUG ALLERGIES       Allergies Reconciled  Family Medical History  Stroke; Heart Disease; -Father's Family History Unknown; -Mother's Family History Unknown; Family history of cancer; Family history of stroke; Family history of heart disease         Social History  Alcohol (Light); lives with children; Retired; Tobacco (Former);          Immunizations  Name Date Admin   Influenza 10/18/2019   Lfxigyvud71 12/01/2011   Prevnar 13 12/01/2010         Review of Systems  · Constitutional  o Denies  o : fatigue, fever, chills, body aches, night sweats  · Eyes  o Denies  o : double vision, blurred vision, changes in vision  · HENT  o Admits  o : nasal congestion  o Denies  o : headaches, vertigo, lightheadedness, recent head injury, sinus pain, postnasal drip  · Cardiovascular  o Denies  o : chest pain, irregular heart beats, rapid heart rate, dyspnea on exertion, lower extremity edema  · Respiratory  o Admits  o : shortness of breath, wheezing  o Denies  o : cough  · Gastrointestinal  o Denies  o : nausea, vomiting, diarrhea, abdominal pain, blood in stools  · Genitourinary  o Denies  o : dysuria, urinary retention  · Endocrine  o Denies  o : polyuria, polydipsia, cold intolerance, heat intolerance      Vitals  Date Time BP Position Site L\R Cuff Size HR RR TEMP (F) WT  HT  BMI kg/m2 BSA m2 O2 Sat FR L/min FiO2 HC       01/12/2021 02:01 /74 Sitting    67 - R  97 244lbs 4oz " "5'  8\" 37.14 2.31 97 %            Physical Examination  · Constitutional  o Appearance  o : alert, in no acute distress  · Head and Face  o Head  o :   § Inspection  § : atraumatic, normocephalic  o Face  o :   § Inspection  § : no facial lesions  o HEENT  o : Unremarkable  · Eyes  o Conjunctivae  o : conjunctivae normal  o Sclerae  o : sclerae white  o Pupils and Irises  o : pupils equal and round, pupils reactive to light bilaterally  o Eyelids/Ocular Adnexae  o : eyelid appearance normal  · Neck  o Inspection/Palpation  o : normal appearance, no masses or tenderness, trachea midline  o Thyroid  o : gland size normal, nontender, no nodules or masses present on palpation  · Respiratory  o Respiratory Effort  o : breathing unlabored  o Auscultation of Lungs  o : diminished breath sounds present    · Cardiovascular  o Heart  o :   § Auscultation of Heart  § : regular rate, normal rhythm, no murmurs present  o Peripheral Vascular System  o :   § Extremities  § : no edema  · Gastrointestinal  o Abdominal Examination  o : abdomen nontender to palpation, normal bowel sounds, tone normal without rigidity or guarding, no masses present, abdominal obesity present, abdomen scaphoid upon supine  o Liver and spleen  o : no hepatomegaly present  · Lymphatic  o Neck  o : no lymphadenopathy   o Supraclavicular Nodes  o : no supraclavicular nodes  · Right Knee  o Inspection  o : no redness, no deformity, no bruising, effusion present   o Palpation  o : lateral joint line tenderness absent, lateral patellar tenderness absent, medial joint line tenderness absent, patellar tendon tenderness absent, no crepitus  o Range of Motion  o : flexion normal 120 degrees, extension normal 0-5 degrees, internal rotation normal 10 degrees, external rotation normal 10 degrees  o Special Tests  o : Lachman Test negative, Anterior Drawer Test negative, Posterior Drawer Test negative, Varus Laxity negative, Valgus Laxity " negative  o Neurovascular  o : neurovascularly intact          Assessment  · COPD (chronic obstructive pulmonary disease)     496/J44.9  Start Anoro Ellipta  · Essential hypertension     401.9/I10  Refill hydrochlorothiazide 25 mg daily. Ensure patient takes HCTZ with lisinopril and atenolol.  · B12 deficiency     266.2/E53.8  · Knee pain, right     719.46/M25.561  X-ray of right knee  · Knee effusion, right     719.06/M25.461      Plan  · Orders  o ACO-39: Current medications updated and reviewed (, 1159F) - - 01/12/2021  o Vitamin B12 Injection () - - 01/12/2021   Injection - Vitamin B12; Dose: 1ML; Site: Left Deltoid; Route: intramuscular; Date: 01/12/2021 14:13:13; Exp: 06/30/2021; Lot: [id number]; Mfg: N/A; TradeName: cyanocobalamin (vitamin B-12); Location: [facility]; Administered By: Gabriela Carrasco MA; Comment: TOLERATED WELL,LEFT OFFICE STABLE.  o Xray knee right [facility] ([id number]) - 719.46/M25.561, 719.06/M25.461 - 01/12/2021  · Medications  o Anoro Ellipta 62.5-25 mcg/actuation inhalation blister with device   SIG: inhale 1 puff by inhalation route once daily at the same time each day   DISP: (1) Inhaler with 3 refills  Prescribed on 01/12/2021     o cetirizine 10 mg oral tablet   SIG: take 1 tablet (10 mg) by oral route once daily for 90 days   DISP: (90) Tablet with 1 refills  Refilled on 01/12/2021     o hydrochlorothiazide 25 mg oral tablet   SIG: take 1 tablet (25 mg) by oral route once daily   DISP: (90) Tablet with 1 refills  Refilled on 01/12/2021     · Instructions  o Patient was educated/instructed on their diagnosis, treatment and medications prior to discharge from the clinic today.  o Patient instructed to seek medical attention urgently for new or worsening symptoms.  o Call the office with any concerns or questions.  · Disposition  o Call or Return if symptoms worsen or persist.  o f/u 1 month            Electronically Signed by: GERALD Chavez  -Author on January 12, 2021 02:41:48 PM

## 2021-05-28 NOTE — PROGRESS NOTES
Patient: REGGIE SANTIAGO     Acct: [id number]     Report: #[id number]  UNIT #: [id number]     : 1945    Encounter Date:2021  PRIMARY CARE: ADWOA BLACKMON  ***ISicandace***  --------------------------------------------------------------------------------------------------------------------  Date of Encounter      Mar 2, 2021            Chief Complaint      DIABETES MELLITUS TYPE I            Referring Providers/Copies To      Referring Provider:  ADWOA BLACKMON      Copies To:   ADWOA BLACKMON            Allergies      Coded Allergies:             PREDNISONE (Verified  Allergy, Unknown, 19)            Medications      Last Reconciled on 3/6/21 8:08 pm by MENA SYED      Insulin Regular Human Rec (HumuLIN R 500 VIAL) 500 Unit/1 Ml Vial      200 UNITS SUBQ QDAY for 30 Days, #1 VIAL 5 Refills         Prov: MENA SYED         21       Lisinopril* (Lisinopril*) 40 Mg Tablet      40 MG PO QDAY, #30 TAB 0 Refills         Reported         20       Simvastatin (Simvastatin*) 20 Mg Tablet      20 MG PO HS, #30 TAB 0 Refills         Reported         20       Alcohol Antiseptic Pads (Alcohol Swabs) 1 Med.pad Med..pad      MED.PAD TOPICAL BID, #200 0 Refills         Prov: Mayur Finley         19       Atenolol (ATENOLOL) 25 Mg Tablet      25 MG PO QDAY, #30 TAB 0 Refills         Prov: Mayur Finley         19            Vital Signs      Height 5 ft 8.5 in / 173.99 cm      Weight 241 lbs 13.514 oz / 109.7 kg      BSA 2.23 m2      BMI 36.2 kg/m2      Temperature 97.8 F / 36.56 C - Temporal      Pulse 82      Respirations 20      Blood Pressure 120/78 Sitting, Left Arm      Pulse Oximetry 99%, room air      Blood Glucose Value:  300 (Dexcom)            Eye Exam      When was your last eye exam?:              Where was it done?:        [facility]- Scanned into chart            Pain Score      Experiencing any pain?:  No            Preventative      Hx  Influenza Vaccination:  No      Influenza Vaccine Declined:  Yes      Have You Had 2 or More Falls i:  No      Have You Had a Fall with an In:  No      Hx Pneumococcal Vaccination:  No      Encouraged to follow-up with:  PCP regarding preventative exams.      Chart initiated by:      Lilly Nelson MA            HPI - Diabetes      This patient is seen in the office today for follow-up evaluation for insulin     pump management.  He is a 75-year-old male patient with a history of type 1     diabetes complicated by diabetic neuropathy.  He is currently managed using a     tandem insulin pump with a Dexcom continuous glucose sensor.  At this time the     patient is using large volumes of Novolin R insulin in.  His pump we have     submitted applications for the patient to receive U 500 insulin but the supplier    is needing additional information.  The patient was instructed he needs to     contact them to complete his application.            His insulin pump was uploaded and reviewed.  The 14-day report indicates a     sensor glucose average of 282 mg/dL with a high of 400 and a low of 77.  He is     using control IQ feature 93% the time.  84% of glucose levels are above target     while 16% are within target of 70 to 180 mg/dL.  He is using 210 units of     insulin each day.  For the most part glucose levels are staying consistently     elevated with several days where glucose levels are in the 500s.            Most Recent Lab Findings      Most Recent HGA1C      His most recent A1c was collected on December 10, 2020 and was 9.6% indicating     uncontrolled type 1 diabetes.            Item Value  Date Time             Hemoglobin A1c 9.6 % H 12/10/20 1504            Urine microalbumin collected on December 10, 2020 was within normal limits            Item Value  Date Time             Urine Random Microalbumin <12.0 mg/L 12/10/20 1504            Diabetes Type:  Type 1      Diabetes Complications:  Neuropathy (He  reports neuropathy of his feet which is     unchanged from a prior appointment)      Hospitalizations 2nd to DM?:  No      ER/911 Secondary to DM:  No      Dietary Habits:  3 Meals daily, Snacks, Diet Drinks      Exercise:  None      BG Monitoring:  CGM (Dexcom)      Frequency:  Times per day (Continuous)      Blood Glucose Range:        Sensor glucose averages 282 mg/dL            PAST,FAMILY,   Past Medical History      Past Medical History:  Arthritis, COPD (With emphysema), Hypertension            Past Surgical History      Past Surgical History:  Cataract Surgery, Spinal Surgery            Social History      Lives independently:  No (Son lives with patient)      Occupation:  Retired            Tobacco Use      Smoking status:  Former smoker            Alcohol Use      None            Substance Use      Substance Use:  Denies Use            Review of Sytems      General:  No Appetite Changes, No Fatigue, No Weight Loss, No Weight Gain, No     Weakness      Eyes:  No Blurred Vision; Corrective Lenses; No Vision Changes, No Vision Loss      Cardiovascular:  No Chest Pain, No Palpitations, No Peripheral edema      Gastrointestinal:  Constipation, Diarrhea; No Nausea/Vomiting      Integumentary:  No Lesions, No Rash, No Wounds      Neurologic:  Extremity Pain (Feet), Numbness (Feet), Tingling (Feet); No     Headache; Dizziness      Psychiatric:  No Anxiety, No Depression; Stress      Endocrine:  No Hypoglycemia, No Hypo Unawareness, No Nocturnal Hypo; Polyuria;     No Polyphagia; Polydipsia      ENT:  Hearing loss, Sinusitis; No Difficulty swallowing      Respiratory:  No Shortness of breath, No Wheezing, No Cough      Genitourinary:  No UTI, No Vaginal yeast infections, No Difficulty urinating, No    Incontinence      Musculoskeletal:  Joint pain, Muscle pain, Back pain            Exam      Constitutional:  Awake and Alert, Appropriately dressed/groomed; Not Acutely     Distressed      Eyes:  No Scleral Icterus;  Intact EOM; No Eyelid swelling      Cardiovascular:  No Peripheral Edema; Normal Chest Appearance      Musculoskeletal:  Steady Gait, Normal Strength, Normal ROM,       Respiratory:  No Respiratory Distress, No Cyanosis, No Accessory Muscle use      Skin:  No Blisters, No Cuts\Scrapes, No Acanthosis Nigricans, No DM Dermopathy,     No Fungus, No NLD, No Rash, No Vitiligo      Psychiatric:  Appropriate Affect, Intact Judgement, Oriented x 3,       ENMT:  Nose/ears normal, Normal hearing      Extremities:  No Cyanosis, No Edema; Normal temperature; No Deformity            Impression      Type 1 diabetes uncontrolled with diabetic neuropathy, arthritis, COPD,     hypertension, obesity            Diagnosis      Type 1 diabetes mellitus with hyperglycemia - E10.65            Plan      No changes were made to the patient's pump settings.  He is to contact the     patient assistance program to see if he can get approved for the U 500 insulin.     If approved he is to contact our office immediately so that changes can be made     to his pump settings to accommodate for the concentrated insulin.  He is to     continue monitoring his glucose levels using his continuous glucose sensor.            Pain Plan      Pain Zero Today            Electronically signed by MENA SYED  03/06/2021 20:08       Disclaimer: Converted document may not contain table formatting or lab diagrams. Please see [employer] for the authenticated document.

## 2021-05-28 NOTE — PROGRESS NOTES
Patient: REGGIE SANTIAGO     Acct: [id number]     Report: #[id number]  UNIT #: [id number]     : 1945    Encounter Date:2020  PRIMARY CARE: ADWOA BLACKMON  ***ISignsushma***  --------------------------------------------------------------------------------------------------------------------  Date of Encounter      Sep 8, 2020            Chief Complaint      DIABETES MELLITUS TYPE I            Referring Providers/Copies To      Referring Provider:  ADWOA BLACKMON      Copies To:   ADWOA BLACKMON            Allergies      Coded Allergies:             PREDNISONE (Verified  Allergy, Unknown, 19)            Medications      Last Reconciled on 8/3/20 22:17 by MENA SYED      Glucose Blood Test Strips (Test Strips) 1 Each Strip      EACH XX QID, #150 3 Refills         Prov: MENA SYED         20       Insulin Lispro (HumaLOG KWIKPEN 100 UNITS/ML) 100 Units/Ml Insuln.pen      25 UNITS SUBQ TID INSULIN, #1 BOX 0 Refills         Reported         20       Insulin Glargine,Hum.rec.anlog (Toujeo Max Solostar) 300 Unit/1 Ml Insuln.pen      120 UNITS SUBQ QDAY for 30 Days, #1 INSULN.PEN         Reported         20       Lisinopril* (Lisinopril*) 10 Mg Tablet      10 MG PO QDAY, #30 TAB 0 Refills         Reported         20       Cyanocobalamin (Vitamin B-12) Inj (Cyanocobalamin Inj) Unknown Strength Vial      IM WE, VIAL         Reported         20       Simvastatin (Simvastatin*) 20 Mg Tablet      20 MG PO HS, #30 TAB 0 Refills         Reported         20       Alcohol Antiseptic Pads (Alcohol Swabs) 1 Med.pad Med..pad      MED.PAD TOPICAL BID, #200 0 Refills         Prov: Mayur Finley         19       Syringe Insulin w Needle 1 ml (Insulin Syringe with Needle, 1 ml) 1 Each     Disp.syrin      SYRINGE XX BID, #100 0 Refills         Prov: Mayur Finley         19       Atenolol (ATENOLOL) 25 Mg Tablet      25 MG PO QDAY, #30 TAB 0 Refills         Prov:  Mayur Finley         6/19/19            Vital Signs      Height 5 ft 8.5 in / 173.99 cm      Weight 250 lbs 0.026 oz / 113.4 kg      BSA 2.26 m2      BMI 37.5 kg/m2      Respirations 18      Blood Pressure 148/82 Sitting, Right Arm            Eye Exam      When was your last eye exam?:        Several Years Ago            Pain Score      Experiencing any pain?:  Yes      Pain Scale Used:  Numerical      Pain Intensity:  5      Pain Comment:        Lower back and shoulder            Preventative      Hx Influenza Vaccination:  Yes      Date Influenza Vaccine Given:  Oct 1, 2019      Influenza Vaccine Declined:  No      Hx Pneumococcal Vaccination:  No      Encouraged to follow-up with:  PCP regarding preventative exams.      Chart initiated by:      Lilly Nelson MA            HPI - Diabetes      This patient is seen in the office today for follow-up evaluation for diabetes     medication management.  He is a 74-year-old male patient with a history of type     1 diabetes uncontrolled.  His diabetes is complicated by diabetic neuropathy of     the feet.  Patient is currently managed taking Toujeo 120 units every day and     Humalog 25 units 3 times a day with meals which he adjust based on size of the     meal and his blood glucose level. (He has been using multiple daily injections     of insulin since being seen in January of this year)  He admits to sometimes     missing his mealtime insulin which is most likely the cause of his     hyperglycemia.  In review the patient's glucose meter his glucose levels are     widely fluctuating from 56 to greater than 450 mg/dL.  The patient has     previously expressed interest in a continuous glucose sensor in insulin pump     however he has been undecided due to to the potential cost.  Today he states he     would like to move forward with these devices.            He denies any health changes or conditions since last being seen.            Most Recent Lab Findings      Most  Recent HGA1C      Most recent A1c was collected on June 17, 2020 and was 8.9% indicating     uncontrolled type 1 diabetes            Item Value  Date Time             Hemoglobin A1c 8.9 % H 6/17/20 1120            Item Value  Date Time             Urine Random Microalbumin 49.4 mg/L H 7/2/19 1105            Diabetes Type:  Type 1      Diabetes Complications:  Neuropathy (Of the feet)      Hospitalizations 2nd to DM?:  No      ER/911 Secondary to DM:  No      Dietary Habits:  3 Meals daily, Snacks      Exercise:  None      Frequency:  Times per day (4 on most days; some days only 3 if he skips a meal     (he has been testing with this frequency for at least 4 months))      Blood Glucose Range:        ; 30 day average 241 her glucose meter            PAST,FAMILY,   Past Medical History      Past Medical History:  Arthritis, Hypertension            Past Surgical History      Past Surgical History:  Cataract Surgery, Spinal Surgery            Social History      Lives independently:  No (Son lives with you)      Occupation:  Retired            Tobacco Use      Smoking status:  Former smoker            Alcohol Use      None            Substance Use      Substance Use:  Denies Use            Review of Sytems      General:  No Appetite Changes, No Fatigue, No Weight Loss; Weight Gain      Eyes:  Positive for Blurred Vision, Positive for Corrective Lenses, Positive for    Vision Changes      Cardiovascular:  No Chest Pain, No Palpitations      Gastrointestinal:  Constipation; No Diarrhea, No Nausea/Vomiting      Integumentary:  No Lesions, No Rash, No Wounds      Neurologic:  No Extremity Pain; Numbness (feet), Tingling (feet)      Psychiatric:  No Anxiety, No Depression      Endocrine:  Hypoglycemia; No Hypo Unawareness; Nocturnal Hypo, Polyuria; No     Polyphagia, No Polydipsia            Exam      General:  Awake and Alert, Appropriately dressed/groomed, No Acute Distress,       Eyes:  Sclera Non-Icteric, EOM  Intact, Eyelids without swelling,       Cardiovascular:  No Peripheral Edema, Normal Chest Appearance, Hear Tones Normal    S1 S2,       Musculoskeletal:  Steady Gait, Normal Strength, Normal ROM,       Respiratory:  No Respiratory Distress, No Cyanosis, No use of Accessory Musc,       Skin:  No Blisters, No Cuts\Scrapes, No Acanthosis Nigricans, No DM Dermopathy,     No Fungus, No NLD, No Rash, No Vitiligo      Psychiatric:  Appropriate Affect, Intact Judgement, Oriented x 3,             Impression      Type 1 diabetes uncontrolled, arthritis, hypertension, obesity            Diagnosis      TYPE 1 DIABETES MELLITUS WITH HYPERGLYCEMIA - E10.65            TYPE 1 DIABETES MELLITUS WITH DIABETIC NEUROPATHY, UNSP - E10.40            Notes      New Medications      * INSULIN LISPRO (HumaLOG VIAL) 100 UNITS/ML VIAL          Sample - Qty 1         Instructions: Take up to 25 units 3 times a day with meals      * Insulin Degludec (Tresiba Flextouch U-200) 200 UNIT/1 ML INSULN.PEN          Sample - Qty 2         Instructions: Take 120 units each day            Plan      We will contact the insulin pump and continuous glucose sensor companies to move    forward on those devices.  In the meantime the patient states that he is in his     coverage With his insurance and his cost of insulin is very high right now.  He     is concerned about purchasing insulin to hold him over in the interim until he     can get on the pump.  The patient was provided samples of Tresiba which he will     take with the same dosing instructions as he was using for the Toujeo.      Additionally a sample of Humalog insulin was provided for his mealtime coverage.     The patient was strongly encouraged to be more diligent in covering his ca    rbohydrates and glucose levels to achieve better glycemic control.  He is to     monitor his glucose levels 3-4 times each day.  Patient will be seen for follow-    up appointment for device training once  approved.            Pain Plan      Follow-up with PCP/Pain Management            Electronically signed by MENA SYED  10/15/2020 09:46       Disclaimer: Converted document may not contain table formatting or lab diagrams. Please see [employer] [facility] for the authenticated document.

## 2021-05-28 NOTE — PROGRESS NOTES
Patient: REGGIE SANTIAGO     Acct: [id number]     Report: #[id number]  UNIT #: [id number]     : 1945    Encounter Date:2020  PRIMARY CARE: ADWOA BLACKMON  ***Home***  --------------------------------------------------------------------------------------------------------------------  Date of Encounter      Dec 16, 2020            Chief Complaint      DIABETES MELLITUS TYPE I            Referring Providers/Copies To      Referring Provider:  ADWOA BLACKMON      Copies To:   ADWOA BLACKMON            Allergies      Coded Allergies:             PREDNISONE (Verified  Allergy, Unknown, 19)            Medications      Last Reconciled on 20 09:10 by MENA SYED      Insulin Human Regular (novoLIN R VIAL) 100 Unit/1 Ml Vial      150 UNITS SUBQ QDAY, #1 VIAL 0 Refills         Prov: MENA SYED         20       Lisinopril* (Lisinopril*) 40 Mg Tablet      40 MG PO QDAY, #30 TAB 0 Refills         Reported         20       Simvastatin (Simvastatin*) 20 Mg Tablet      20 MG PO HS, #30 TAB 0 Refills         Reported         20       Alcohol Antiseptic Pads (Alcohol Swabs) 1 Med.pad Med..pad      MED.PAD TOPICAL BID, #200 0 Refills         Prov: Mayur Finley         19       Atenolol (ATENOLOL) 25 Mg Tablet      25 MG PO QDAY, #30 TAB 0 Refills         Prov: Mayur Finley         19            Vital Signs      Height 5 ft 8.5 in / 173.99 cm      Weight 249 lbs 5.444 oz / 113.1 kg      BSA 2.26 m2      BMI 37.4 kg/m2      Temperature 98.6 F / 37 C - Temporal      Pulse 86      Respirations 20      Blood Pressure 140/78 Sitting, Right Arm      Pulse Oximetry 98%, room air      Blood Glucose Value:  438 (Finger stick)            Eye Exam      When was your last eye exam?:        Several Years Ago            Pain Score      Experiencing any pain?:  No            Preventative      Hx Influenza Vaccination:  Yes      Date Influenza Vaccine Given:  Oct 1, 2019       Influenza Vaccine Declined:  Yes      Have You Had 2 or More Falls i:  No      Have You Had a Fall with an In:  No      Hx Pneumococcal Vaccination:  No      Encouraged to follow-up with:  PCP regarding preventative exams.      Chart initiated by:      Lilly Nelson MA            HPI - Diabetes      This patient is seen in the office today for follow-up evaluation for.  He is a     74-year-old male patient with history of type 1 diabetes who was recently     started on a tandem insulin pump with a Dexcom continuous glucose sensor.  It is    noted the patient's glucose level is 438 upon arrival to the clinic today.            His insulin pump was uploaded for 14-day review and the records were discussed     with the patient.  The sensor glucose averages 272 mg/dL with a high of 400 and     a low of 58.  It should be noted the report will not record values greater than     400.  The patient has control IQ technology in use 55% of the time.  84% of     glucose levels are above 180 mg/dL while 60% are within target between 70 and     180 mg/dL.  He is using approximately 180 units/day.  Glucose levels are     excessively elevated during the daytime hours despite the patient's compliance     with entering carbohydrates and glucose levels into the pump.  Glucose levels     are highest during the hours between 9 AM and 3 PM.            Because of the patient's high use of insulin he is having to change his pump     cartridge every day or so.  He can make it for a little over one day.  We have     made multiple attempts to acquire concentrated insulin for use in his insulin     pump however he says the cost is too high.  He is currently using Novolin R     insulin in his pump.  We will need to request an increase in his pump supplies     to accommodate the high usage.            The patient indicates he has recently been diagnosed with emphysema and has been    prescribed a rescue inhaler.            Most Recent Lab  Findings      Most Recent HGA1C      Most current A1c was collected on December 10 and was 9.6% indicating     uncontrolled type 1 diabetes.            Item Value  Date Time             Hemoglobin A1c 9.6 % H 12/10/20 1504            Urine microalbumin is within normal limits            Item Value  Date Time             Urine Random Microalbumin <12.0 mg/L 12/10/20 1504            Diabetes Type:  Type 1      Diabetes Complications:  Neuropathy (He reports neuropathy of his feet which is     unchanged from a prior appointment)      Hospitalizations 2nd to DM?:  No      ER/911 Secondary to DM:  No      Dietary Habits:  3 Meals daily, Snacks      Exercise:  None      BG Monitoring:  CGM      Frequency:  Times per day (Continuous)      Blood Glucose Range:        Sensor glucose averages 272 mg/dL            PAST,FAMILY,   Past Medical History      Past Medical History:  Arthritis, COPD (With emphysema), Hypertension            Past Surgical History      Past Surgical History:  Cataract Surgery, Spinal Surgery            Social History      Lives independently:  No (Son lives with patient)      Occupation:  Retired            Tobacco Use      Smoking status:  Former smoker            Alcohol Use      None            Substance Use      Substance Use:  Denies Use            Review of Sytems      General:  No Appetite Changes, No Fatigue, No Weight Loss, No Weight Gain, No     Weakness      Eyes:  No Blurred Vision; Corrective Lenses; No Vision Changes, No Vision Loss      Cardiovascular:  No Chest Pain, No Palpitations, No Peripheral edema      Gastrointestinal:  No Constipation, No Diarrhea, No Nausea/Vomiting      Integumentary:  No Lesions, No Rash, No Wounds      Neurologic:  No Extremity Pain; Numbness (feet); No Tingling, No Headache, No     Dizziness      Psychiatric:  No Anxiety, No Depression, No Stress      Endocrine:  No Hypoglycemia, No Hypo Unawareness, No Nocturnal Hypo; Polyuria     (night); No Polyphagia;  Polydipsia (evening)      ENT:  No Hearing loss, No Sinusitis, No Difficulty swallowing      Respiratory:  Shortness of breath (With activity); No Wheezing, No Cough      Genitourinary:  No UTI, No Vaginal yeast infections, No Difficulty urinating, No    Incontinence      Musculoskeletal:  No Joint pain, No Muscle pain; Back pain      Other      Knee Pain            Exam      Constitutional:  Awake and Alert, Appropriately dressed/groomed; Not Acutely     Distressed      Eyes:  No Scleral Icterus; Intact EOM; No Eyelid swelling      Cardiovascular:  No Peripheral Edema; Normal Chest Appearance, Normal Heart     Tones S1 S2      Musculoskeletal:  Steady Gait, Normal Strength, Normal ROM,       Respiratory:  No Respiratory Distress, No Cyanosis, No Accessory Muscle use      Skin:  No Blisters, No Cuts\Scrapes, No Acanthosis Nigricans, No DM Dermopathy,     No Fungus, No NLD, No Rash, No Vitiligo      Psychiatric:  Appropriate Affect, Intact Judgement, Oriented x 3,       Gastrointestinal:  Abdomen Soft; No Abdominal tenderness, No Abdominal     distention      ENMT:  Nose/ears normal, Good dentition, Normal hearing      Extremities:  No Cyanosis, No Edema; Normal temperature; No Deformity            Impression      Type 1 diabetes uncontrolled with neuropathy, arthritis, COPD, hypertension,     obesity            Diagnosis      Notes            Plan      At this time we elected to turn off the control IQ feature of his pump to see if    this will make a difference in glucose control.  The patient will call our     office in 1 to 2 weeks for discussion of glucose levels.  He will continue to     monitor his glucose using his Dexcom continuous glucose sensor no other changes     were made today.  We will continue to pursue concentrated insulin to save on     cost of pump supplies.            Pain Plan      Pain Zero Today            Electronically signed by MENA SYED  12/29/2020 09:00       Disclaimer: Converted  document may not contain table formatting or lab diagrams. Please see "[employer] System for the authenticated document.

## 2021-05-28 NOTE — PROGRESS NOTES
Patient: REGGIE SANTIAGO     Acct: [id number]     Report: #[id number]  UNIT #: [id number]     : 1945    Encounter Date:2020  PRIMARY CARE: ADWOA CAO  ***Signed***  --------------------------------------------------------------------------------------------------------------------  Encounter Date      2020            Reason for Visit      This patient is seen in the office today for evaluation for diabetes medication     management.  He is referred to our office by his primary care provider, Kun Cao.  The patient is a 74-year-old male patient with a 3-year history of type    2 diabetes uncontrolled.  The patient is currently managed on Toujeo 120 units     every day and Novolin R 10 units at [facility].  Additionally he takes metformin     1000 mg twice a day.  He states that he was previously treated with Janumet but     that was discontinued.  The patient is testing his blood sugar 4 times a day and    brings blood glucose logs to the appointment today which demonstrate glucose     levels ranging from 77 mg/dL to 512 mg/dL.  Patient states that he has been seen    in the emergency department due to hypoglycemia because he ran out of insulin     due to insurance issues.  The patient states he has had formal diabetes     education in the past.  He denies complications of his diabetes.  Patient had a     substantial rise in his A1c.  The A1c on 2019 was 6.6% then when repeated     on 12/3/2019 was up to 9.1%.  This is most likely due to the patient's failure     to take his insulin when he is experiencing insurance issues.            Lab Results      Labs were reviewed and as mentioned previously, the patient has had a     substantial increase in his A1c.  An A1c on 2019 was 6.6% and arlin to 9.1%     on 2019.  This was discussed with the patient.  Patient also has positive     microalbumin in the urine.            Item Value  Date Time             Hemoglobin A1c 9.1  % H 12/3/19 1150             Hemoglobin A1c 6.6 % H 8/23/19 1516             Hemoglobin A1c 8.6 % H 7/2/19 1105             Urine Random Microalbumin 49.4 mg/L H 7/2/19 1105            History and Physical      Diabetes Assessment      DIET: The patient states he eats 2-3 meals a day and sometimes will have a     snack; he admits to drinking regular Pepsi and orange drink that are not sugar-    free; he states sugar-free drinks make him sick to his stomach      EXERCISE: He does some stretching exercises in the morning but otherwise is     unable to do extensive exercise due to back problems; he uses a cane for     assistance with ambulation        BG MONITORING: he is testing his blood sugar 4 times a day.            Allergies/Medications      Allergies:        Coded Allergies:             PREDNISONE (Verified  Allergy, Unknown, 6/16/19)      Medications    Last Reconciled on 2/22/20 3:58 pm by MENA SYED      Insulin Lispro (HumaLOG KWIKPEN 100 UNITS/ML) 100 Units/Ml Insuln.pen      25 UNITS SUBQ TID INSULIN, #1 BOX 0 Refills         Reported         2/22/20       Insulin Glargine,Hum.rec.anlog (Toujeo Max Solostar) 300 Unit/1 Ml Insuln.pen      120 UNITS SUBQ QDAY for 30 Days, #1 INSULN.PEN         Reported         2/22/20       Lisinopril* (Lisinopril*) 10 Mg Tablet      10 MG PO QDAY, #30 TAB 0 Refills         Reported         2/22/20       Cyanocobalamin (Vitamin B-12) Inj (Cyanocobalamin Inj) Unknown Strength Vial      IM WE, VIAL         Reported         2/22/20       Simvastatin (Simvastatin*) 20 Mg Tablet      20 MG PO HS, #30 TAB 0 Refills         Reported         2/22/20       Alcohol Antiseptic Pads (Alcohol Swabs) 1 Med.pad Med..pad      MED.PAD TOPICAL BID, #200 0 Refills         Prov: Mayur Finley         6/19/19       Syringe Insulin w Needle 1 ml (Insulin Syringe with Needle, 1 ml) 1 Each     Disp.syrin      SYRINGE XX BID, #100 0 Refills         Prov: Mayur Finley         6/19/19        Atenolol (ATENOLOL) 25 Mg Tablet      25 MG PO QDAY, #30 TAB 0 Refills         Prov: Mayur Finley         6/19/19       metFORMIN HCl (metFORMIN HCl) 500 Mg Tablet      1000 MG PO BID, #120 TAB 0 Refills         Prov: Mayur Finley         6/19/19            Review of Systems      CONSTITUTIONAL: Reports a 30 pound weight loss over the last 3 months, denies     weakness, admits to always feeling fatigued      ENDOCRINOLOGIC: Denies polyuria, polydipsia, or polyphagia; admits to occasional    hypoglycemia; denies hypoglycemia unawareness but admits to nocturnal     hypoglycemia      SKIN: Denies rash, but admits to psoriasis type lesions on the arms and legs      NEUROLOGICAL: Admits to numbness and tingling in the lower extremities      EYES: Admits to occasional blurred vision      PSYCHIATRIC: Denies depression or anxiety but admits to lots of stress.            EXAM      EXAM: Skin examination shows plaque-like lesions on the arms and legs associated    with psoriasis            Blood Glucose: 214      HT/WT/BMI:             Height 5 ft 8 in / 172.72 cm           Weight 211 lbs  / 95.290354 kg           BSA 2.09 m2           BMI 32.1 kg/m2            Count includes the [facility]      Family History: Denies family history of diabetes            Past Medical History: Positive for hypertension, urinary frequency, arthritis,     diabetic neuropathy of the feet            Past Surgical History: Bilateral cataract removal, lumbar laminectomy, LASEK lau    rgery            Psychosocial History: He is a retired hunt and ; his wife     passed away in March 2019 and his son lives with him now; he denies use of     alcohol and recreational drugs; he quit smoking 6 years ago and quit vaping in     June of last year            Quality Measures      Current yr A1c result 7-9%:  Yes            Impression      Type 2 diabetes uncontrolled with diabetic neuropathy, hypertension, urinary     frequency, arthritis            Plan       At this time the patient was told to continue his Toujeo 120 units every day, we    will switch him to the Toujeo max insulin pen.  We will add Humalog insulin at     mealtime taking 5, 8, or 12 units of insulin for small, medium, or large sized     meals based on carbohydrate content.  Additionally we will add Humalog for     correction of glucose levels greater than 150 mg/dL starting with 2 units and     going up by 1 unit increments.  The patient is to monitor his glucose levels 4     times a day and log them for follow-up appointment.  We will obtain an A1c on     this patient.  Prescriptions for the Toujeo max and the Humalog insulin will be     called into his pharmacy.  He was counseled on his diet and importance of     avoiding sugary drinks.  He will be scheduled for a follow-up appointment in 1     month            Total time spent with patient was 40 minutes of which half of that time was     spent counseling the patient regarding diet, medications, and monitoring            Patient Education      Yes      ACO BMI High above 25:  Counseling Given, Encourage dietary changes            PREVENTION      Hx Influenza Vaccination:  Yes      Date Influenza Vaccine Given:  Oct 1, 2019      Influenza Vaccine Declined:  No      2 or More Falls Past Year?:  No      Fall Past Year with Injury?:  No      Hx Pneumococcal Vaccination:  No      Encouraged to follow-up with:  PCP regarding preventative exams.            Electronically signed by MENA SYED  02/22/2020 15:58       Disclaimer: Converted document may not contain table formatting or lab diagrams. Please see [employer] for the authenticated document.

## 2021-05-28 NOTE — PROGRESS NOTES
Patient: REGGIE SANTIAGO     Acct: [id number]     Report: #[id number]  UNIT #: [id number]     : 1945    Encounter Date:2021  PRIMARY CARE: ADWAO BLACKMON  ***ISignsushma***  --------------------------------------------------------------------------------------------------------------------  Date of Encounter      May 13, 2021            Chief Complaint      DIABETES MELLITUS TYPE I            Referring Providers/Copies To      Referring Provider:  ADWOA BLACKMON      Copies To:   ADWOA BLACKMON            Allergies      Coded Allergies:             PREDNISONE (Verified  Allergy, Unknown, 19)            Medications      Last Reconciled on 21 6:14 pm by MENA SYED      Insulin Regular Human Rec (HumuLIN R 500 VIAL) 500 Unit/1 Ml Vial      200 UNITS SUBQ QDAY for 30 Days, #1 VIAL 5 Refills         Prov: MENA SYED         21       Lisinopril* (Lisinopril*) 40 Mg Tablet      40 MG PO QDAY, #30 TAB 0 Refills         Reported         20       Simvastatin (Simvastatin*) 20 Mg Tablet      20 MG PO HS, #30 TAB 0 Refills         Reported         20       Alcohol Antiseptic Pads (Alcohol Swabs) 1 Med.pad Med..pad      MED.PAD TOPICAL BID, #200 0 Refills         Prov: Mayur Finley         19       Atenolol (ATENOLOL) 25 Mg Tablet      25 MG PO QDAY, #30 TAB 0 Refills         Prov: Mayur Finley         19            Vital Signs      Height 5 ft 8.50 in / 173.99 cm      Weight 234 lbs 12.638 oz / 106.5 kg      BSA 2.20 m2      BMI 35.2 kg/m2      Temperature 97.6 F / 36.44 C - Temporal      Pulse 78      Respirations 18      Blood Pressure 140/86 Sitting, Right Arm      Pulse Oximetry 96%, room air      Blood Glucose Value:  112 (Dexcom)            Eye Exam      When was your last eye exam?:              Where was it done?:        [facility]- Scanned into chart            Pain Score      Experiencing any pain?:  Yes      Pain Scale Used:   Numerical      Pain Intensity:  7      Pain Comment:        All over- Joints and Muscles            Preventative      Hx Influenza Vaccination:  No      Influenza Vaccine Declined:  Yes      Have You Had 2 or More Falls i:  No      Have You Had a Fall with an In:  No      Hx Pneumococcal Vaccination:  No      Encouraged to follow-up with:  PCP regarding preventative exams.      Chart initiated by:      Lilly Nelson MA            HPI - Diabetes      This patient is seen in the office today for insulin pump management     appointment.  He is a 75-year-old male patient with a history of type 1 diabetes    with diabetic neuropathy.  He is currently managed on a tandem insulin pump with    a Dexcom continuous glucose sensor.  He has been using regular insulin in his     pump.  The patient presents today to initiate use of Humulin R U 500 insulin in     the pump.            His insulin pump was uploaded and the records were reviewed.  The sensor glucose    averages 252 mg/dL with a high of 400 and a low of 42.  24% of glucose levels     are in target range between 70 and 180 while 75% are above target and 1% below     target.  He is using control IQ technology 94% the time.  He is using     approximately 190 units of insulin each day.            Most Recent Lab Findings      Most Recent HGA1C      His most recent A1c was collected on April 21, 2021 and was 9.2% indicating     uncontrolled type 1 diabetes.  This is down slightly from the previous result of    9.6 collected in December 2020      Diabetes Type:  Type 1      Diabetes Complications:  Neuropathy (He reports neuropathy of his feet which is     unchanged from a prior appointment)      Hospitalizations 2nd to DM?:  No      ER/911 Secondary to DM:  No      Dietary Habits:  3 Meals daily, Snacks, Diet Drinks      Exercise:  None      BG Monitoring:  CGM (Dexcom)      Frequency:  Times per day (Continuous)      Blood Glucose Range:        Sensor glucose averages 252  mg/dL            PAST,FAMILY,   Past Medical History      Past Medical History:  Arthritis, COPD (With emphysema), Hypertension            Past Surgical History      Past Surgical History:  Cataract Surgery, Spinal Surgery            Social History      Lives independently:  No (Son lives with patient)      Occupation:  Retired            Tobacco Use      Smoking status:  Former smoker            Alcohol Use      None            Substance Use      Substance Use:  Denies Use            Review of Sytems      General:  No Appetite Changes, No Fatigue, No Weight Loss, No Weight Gain, No     Weakness      Eyes:  Blurred Vision; No Corrective Lenses, No Vision Changes, No Vision Loss      Cardiovascular:  No Chest Pain, No Palpitations, No Peripheral edema      Gastrointestinal:  No Constipation, No Diarrhea, No Nausea/Vomiting      Integumentary:  No Lesions, No Rash, No Wounds      Neurologic:  No Extremity Pain, No Numbness, No Tingling, No Headache, No     Dizziness      Psychiatric:  No Anxiety, No Depression, No Stress      Endocrine:  Hypoglycemia; No Hypo Unawareness, No Nocturnal Hypo, No Polyuria,     No Polyphagia; Polydipsia      ENT:  No Hearing loss, No Sinusitis, No Difficulty swallowing      Respiratory:  No Shortness of breath, No Wheezing, No Cough      Genitourinary:  No UTI, No Vaginal yeast infections, No Difficulty urinating, No    Incontinence      Musculoskeletal:  Joint pain, Muscle pain, Back pain            Exam      Constitutional:  Awake and Alert, Appropriately dressed/groomed; Not Acutely     Distressed      Eyes:  No Scleral Icterus; Intact EOM; No Eyelid swelling      Cardiovascular:  No Peripheral Edema; Normal Chest Appearance      Musculoskeletal:  Steady Gait (He uses a cane), Normal Strength, Normal ROM,       Respiratory:  No Respiratory Distress, No Cyanosis, No Accessory Muscle use      Skin:  No Blisters, No Cuts\Scrapes, No Acanthosis Nigricans, No DM Dermopathy,     No Fungus,  No NLD, No Rash, No Vitiligo      Psychiatric:  Appropriate Affect, Intact Judgement, Oriented x 3,       ENMT:  Nose/ears normal, Normal hearing      Extremities:  No Cyanosis, No Edema; Normal temperature; No Deformity            Impression      Type 1 diabetes uncontrolled with diabetic neuropathy, arthritis, COPD,     hypertension, obesity class II with BMI of 35.2            Diagnosis      Type 1 diabetes mellitus with hyperglycemia - E10.65            Plan      The patient was started on the concentrated insulin and the following changes     were made to his pump settings:            Prior settings:      12 AM basal rate of 6.0 units/h      Correction factor of 1-25      Carbohydrate ratio of 1:3            New settings:      12 AM basal rate of 1.2      Correction factor of 1-60      Carbohydrate ratio 1:15            The patient is to monitor his glucose levels carefully using his Dexcom     continuous glucose sensor.  If he experiences any problematic hypoglycemia he is    to contact her office immediately.  He will be scheduled for a follow-up     appointment in 1 month for reevaluation.            Pain Plan      Follow-up with PCP/Pain Management            Electronically signed by MENA SYED  05/16/2021 18:14       Disclaimer: Converted document may not contain table formatting or lab diagrams. Please see [employer] for the authenticated document.

## 2021-05-28 NOTE — PROGRESS NOTES
Patient: REGGIE SANTIAGO     Acct: [id number]     Report: #[id number]  UNIT #: [id number]     : 1945    Encounter Date:2021  PRIMARY CARE: ADWOA BLACKMON  ***Home***  --------------------------------------------------------------------------------------------------------------------  Date of Encounter      2021            Chief Complaint      DIABETES MELLITUS TYPE I            Referring Providers/Copies To      Referring Provider:  ADWOA BLACKMON      Copies To:   ADWOA BLACKMON            Allergies      Coded Allergies:             PREDNISONE (Verified  Allergy, Unknown, 19)            Medications      Last Reconciled on 21 1:18 pm by MENA SYED      Insulin Regular Human Rec (HumuLIN R 500 VIAL) 500 Unit/1 Ml Vial      200 UNITS SUBQ QDAY for 30 Days, #1 VIAL 5 Refills         Prov: MENA SYED         21       Lisinopril* (Lisinopril*) 40 Mg Tablet      40 MG PO QDAY, #30 TAB 0 Refills         Reported         20       Simvastatin (Simvastatin*) 20 Mg Tablet      20 MG PO HS, #30 TAB 0 Refills         Reported         20       Alcohol Antiseptic Pads (Alcohol Swabs) 1 Med.pad Med..pad      MED.PAD TOPICAL BID, #200 0 Refills         Prov: Mayur Finley         19       Atenolol (ATENOLOL) 25 Mg Tablet      25 MG PO QDAY, #30 TAB 0 Refills         Prov: Mayur Finley         19            Vital Signs      Height 5 ft 8.50 in / 173.99 cm      Weight 242 lbs 1.041 oz / 109.8 kg      BSA 2.23 m2      BMI 36.3 kg/m2      Temperature 98.1 F / 36.72 C - Temporal      Pulse 56      Respirations 18      Blood Pressure 124/70 Sitting, Left Arm      Pulse Oximetry 95%, room air      Blood Glucose Value:  213 (Dexcom)            Eye Exam      When was your last eye exam?:              Where was it done?:        [facility]- Scanned into chart            Pain Score      Experiencing any pain?:  Yes      Pain Scale Used:   Numerical      Pain Intensity:  8      Pain Comment:        Left Hip            Preventative      Hx Influenza Vaccination:  No      Influenza Vaccine Declined:  Yes      Have You Had 2 or More Falls i:  No      Have You Had a Fall with an In:  No      Hx Pneumococcal Vaccination:  No      Encouraged to follow-up with:  PCP regarding preventative exams.      Chart initiated by:      Lilly Nelson MA            HPI - Diabetes      This patient is seen in the office today for follow-up evaluation for insulin p    ump management.  He is a 75-year-old male patient with a history of type 1     diabetes complicated by diabetic neuropathy.  The patient is currently managed     on a tandem insulin pump with a Dexcom continuous glucose sensor.  Patient     continues to use Novolin R insulin in his pump.  He has been approved for     patient assistance to receive Humulin R U 500 insulin but has yet to receive the    insulin from the manufacture.  He also indicates he dropped his pump and     shattered the glass screen and the device had to be replaced.  The report today     only shows 5 days of values because of the device being replaced.            The pump report indicates an average sensor glucose value of 269 mg/dL with a     high of 400 and low of 97.  15% of glucose levels are in target range while 85%     are above target.              We received a copy of his most recent eye exam report which indicates no     findings of retinopathy or macular edema.            Most Recent Lab Findings      Most Recent HGA1C      A point-of-care A1c was collected in the office today and was 9.2% indicating     uncontrolled type 1 diabetes.  This is improved slightly from the prior result     of 9.6% in December 2020.      Diabetes Type:  Type 1      Diabetes Complications:  Neuropathy (He reports neuropathy of his feet which is     unchanged from a prior appointment)      Hospitalizations 2nd to DM?:  No      ER/911 Secondary to DM:   No      Dietary Habits:  3 Meals daily, Snacks, Diet Drinks      Exercise:  None      BG Monitoring:  CGM (Dexcom)      Frequency:  Times per day (Continuous)      Blood Glucose Range:        Sensor glucose averages 269 mg/dL            PAST,FAMILY,   Past Medical History      Past Medical History:  Arthritis, COPD (With emphysema), Hypertension            Past Surgical History      Past Surgical History:  Cataract Surgery, Spinal Surgery            Social History      Lives independently:  No (Son lives with patient)      Occupation:  Retired            Tobacco Use      Smoking status:  Former smoker            Alcohol Use      None            Substance Use      Substance Use:  Denies Use            Review of Sytems      General:  No Appetite Changes, No Fatigue, No Weight Loss, No Weight Gain, No     Weakness      Eyes:  No Blurred Vision, No Corrective Lenses, No Vision Changes, No Vision     Loss      Cardiovascular:  No Chest Pain, No Palpitations, No Peripheral edema      Gastrointestinal:  Constipation; No Diarrhea, No Nausea/Vomiting      Integumentary:  No Lesions, No Rash, No Wounds      Neurologic:  No Extremity Pain; Numbness; No Tingling, No Headache, No Dizziness      Psychiatric:  No Anxiety, No Depression; Stress      Endocrine:  Hypoglycemia; No Hypo Unawareness; Nocturnal Hypo, Polyuria,     Polyphagia, Polydipsia      ENT:  No Hearing loss, No Sinusitis, No Difficulty swallowing      Respiratory:  No Shortness of breath, No Wheezing, No Cough      Genitourinary:  No UTI, No Vaginal yeast infections, No Difficulty urinating, No    Incontinence      Musculoskeletal:  Joint pain, Muscle pain, Back pain            Exam      Constitutional:  Awake and Alert, Appropriately dressed/groomed; Not Acutely     Distressed      Eyes:  No Scleral Icterus; Intact EOM; No Eyelid swelling      Cardiovascular:  No Peripheral Edema; Normal Chest Appearance      Musculoskeletal:  Steady Gait (He uses a cane to assist  with ambulation), Normal    Strength, Normal ROM,       Respiratory:  No Respiratory Distress, No Cyanosis, No Accessory Muscle use      Skin:  No Blisters, No Cuts\Scrapes, No Acanthosis Nigricans, No DM Dermopathy,     No Fungus, No NLD, No Rash, No Vitiligo      Psychiatric:  Appropriate Affect, Intact Judgement, Oriented x 3,       ENMT:  Nose/ears normal      Extremities:  No Cyanosis, No Edema; Normal temperature; No Deformity            Impression      Type 1 diabetes uncontrolled with diabetic neuropathy, arthritis, COPD with     emphysema, hypertension, obesity class II            Diagnosis      Type 1 diabetes mellitus with hyperglycemia - E10.65            Type 1 diabetes mellitus with diabetic neuropathy, unspecified - E10.40            Notes      Renewed Medications      * Insulin Regular Human Rec (HumuLIN R 500 VIAL) 500 UNIT/1 ML VIAL: 200 UNITS       SUBQ QDAY 30 Days #1         Instructions: use up to 200 units per day per insulin pump         Dx: Type 1 diabetes mellitus with hyperglycemia - E10.65      New Office Procedures      * POINT OF CARE HGA1C, Routine         Dx: Type 1 diabetes mellitus with hyperglycemia - E10.65            Plan      We made no changes to the patient's pump settings today.  The patient is to     contact the [employer] company who has approved him for patient assistance in     acquiring the Humulin R U 500 insulin.  A prescription was sent to his pharmacy     for the medication, however, if he is able to find out the cause of the delay     for the insulin from the manufacture he will not  the prescription sent     in today but will wait for his delivery of the insulin.  He will then contact     our office and make an appointment so his settings can be modified to accommo    date the concentrated insulin.  In the meantime he will continue to monitor his     glucose levels using his continuous glucose sensor.            Pain Plan      Follow-up with PCP/Pain Management             Electronically signed by MENA SYED  04/21/2021 13:18       Disclaimer: Converted document may not contain table formatting or lab diagrams. Please see [employer] for the authenticated document.

## 2021-05-28 NOTE — PROGRESS NOTES
Patient: ALEKSANDAR SANTIAGO     Acct: [id number]     Report: #[id number]  UNIT #: [id number]     : 1945    Encounter Date:2020  PRIMARY CARE: ADWOA BLACKMON  ***Signed***  --------------------------------------------------------------------------------------------------------------------  TELEHEALTH NOTE      History of Present Illness            Chief Complaint: Type 2 DM uncontrolled            Aleksandar Santiago is presenting for evaluation via Telehealth visit. Verbal consent     obtained before beginning visit.            Provider spent 27 minutes with the patient during telehealth visit.            The following staff were present during the visit: Galina DUARTE                         Past Med History      Type 2 diabetes uncontrolled with diabetic neuropathy of the feet, hypertension,    urinary frequency, arthritis      Overview of Symptoms      The patient is currently taking Toujeo 120 units every morning and using Humalog    10, 12, and 14 units with meals plus correction for blood glucose levels greater    than 150 starting with 2 units and then increasing by 1 unit increments     thereafter.  At the patient's last appointment he was instructed to use a     carbohydrate scale of 8, 10, units however the patient states that he could not     remember using the scale previously mentioned.  He is testing his blood sugar 3-    4 times every day and reports fasting blood sugars over the last week between 62    and 81 mg/dL noontime glucose levels between 127 and 329 mg/dL and suppertime     glucose levels between 390 and 400 mg/dL.  He states he averages around 12 units    of Humalog with breakfast, 15 to 21 units at lunchtime and 18 to 28 units at     suppertime.  He states he ran out of test strips this Monday morning but he was     able to get some from the pharmacy by paying out-of-pocket and is requesting a     refill.  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  He    also admits to eating occasional snacks in the mid-day, such as potato chips or     ice cream and he does not take insulin with snacks.             He states episodes of hypoglycemia are rare and denies any significant problems     due to the occasional event            Plan      Orders:  Phone Eval 21-30 mi [id number]      Instructions      * Plan Of Care: A great deal of the call was talking to the patient about diet      aníbal behaviors and strategies to control carbohydrate intake.  Given that       fasting glucose levels appear to be the most control glucose levels he has,       the Toujeo is at an appropriate level but there is a need to increase the       mealtime insulin.  The patient was instructed to increase the mealtime insulin      to taking 15, 20, and 25 units with meals based on amount of carbohydrates.        We discussed the importance of adjusting this downward if glucose levels are       low or if he is going to be more physically active to prevent hypoglycemia.        The patient is to continue monitoring his glucose levels 3-4 times each day       and log those levels so we can discuss him at a follow-up appointment.  I will      call the patient in 1 week to see if glucose levels improve.  If the patient       experiences any hypoglycemia he is to contact her office immediately so       adjustments can be made.      * Chronic conditions reviewed and taken into consideration for today's treatment      plan.      * Patient instructed to seek medical attention urgently for new or worsening       symptoms.      * Patient was educated/instructed on their diagnosis, treatment and medications       prior to discharge from the clinic today.            Electronically signed by MENA SYED  03/25/2020 12:15       Disclaimer: Converted document may not contain table formatting or lab diagrams. Please see [employer] for the authenticated document.

## 2021-05-28 NOTE — PROGRESS NOTES
Patient: REGGIE SANTIAGO     Acct: [id number]     Report: #[id number]  UNIT #: [id number]     : 1945    Encounter Date:2020  PRIMARY CARE: ADWOA BLACKMON  ***ISigned***  --------------------------------------------------------------------------------------------------------------------  Date of Encounter      2020            Chief Complaint      elevated glucose levels            Referring Providers/Copies To      Referring Provider:  ADWOA BLACKMON      Copies To:   ADWOA BLACKMON            Allergies      Coded Allergies:             PREDNISONE (Verified  Allergy, Unknown, 19)            Medications      Last Reconciled on 20 3:58 pm by MENA SYED      Glucose Blood Test Strips (Test Strips) 1 Each Strip      EACH XX QID, #150 3 Refills         Prov: MENA SYED         20       Insulin Lispro (HumaLOG KWIKPEN 100 UNITS/ML) 100 Units/Ml Insuln.pen      25 UNITS SUBQ TID INSULIN, #1 BOX 0 Refills         Reported         20       Insulin Glargine,Hum.rec.anlog (Toujeo Max Solostar) 300 Unit/1 Ml Insuln.pen      120 UNITS SUBQ QDAY for 30 Days, #1 INSULN.PEN         Reported         20       Lisinopril* (Lisinopril*) 10 Mg Tablet      10 MG PO QDAY, #30 TAB 0 Refills         Reported         20       Cyanocobalamin (Vitamin B-12) Inj (Cyanocobalamin Inj) Unknown Strength Vial      IM WE, VIAL         Reported         20       Simvastatin (Simvastatin*) 20 Mg Tablet      20 MG PO HS, #30 TAB 0 Refills         Reported         20       Alcohol Antiseptic Pads (Alcohol Swabs) 1 Med.pad Med..pad      MED.PAD TOPICAL BID, #200 0 Refills         Prov: Mayur Finley         19       Syringe Insulin w Needle 1 ml (Insulin Syringe with Needle, 1 ml) 1 Each     Disp.syrin      SYRINGE XX BID, #100 0 Refills         Prov: Mayur Finley         19       Atenolol (ATENOLOL) 25 Mg Tablet      25 MG PO QDAY, #30 TAB 0 Refills         Prov:  Mayur Finley         6/19/19            Vital Signs      Height 5 ft 8 in / 172.72 cm      Weight 245 lbs 5.952 oz / 111.3 kg      BSA 2.23 m2      BMI 37.3 kg/m2      Respirations 22      Blood Pressure 118/62 Sitting, Right Arm            Eye Exam      When was your last eye exam?:        2 YEARS AGO            Pain Score      Experiencing any pain?:  Yes      Pain Scale Used:  Numerical      Pain Intensity:  6      Pain Comment:        All over, feet swollen, back pain, shoulder, arthritis            Preventative      Hx Influenza Vaccination:  Yes      Date Influenza Vaccine Given:  Oct 1, 2019      Influenza Vaccine Declined:  No      Have You Had 2 or More Falls i:  No      Have You Had a Fall with an In:  No      Hx Pneumococcal Vaccination:  Yes      Encouraged to follow-up with:  PCP regarding preventative exams.      Chart initiated by:      Lilly Nelson MA            HPI - Diabetes      This patient is seen in the office today for follow-up evaluation for diabetes     medication management.  He is a 74-year-old male patient with a history of type     2 diabetes uncontrolled with diabetic neuropathy.  Recently, however, we     collected a C-peptide with fasting glucose level on the patient which indicates     the patient has transition to type 1 diabetes.  The patient is currently managed    using Toujeo 120 units every day and Humalog 10 to 25 units 3 times a day with     meals based on carbohydrate content and glucose levels.  The patient reports     glucose levels are still fluctuating with lows in the morning and highs in the     evenings at times.  He admits to occasionally drinking sugary drinks still.  The    patient states his metformin was discontinued approximately 3 months ago.            The patient is complaining of swelling in his feet.  It is noted that he has 3+     pitting edema in the right foot and ankle area with 2+ on the left side.  He     states this is recently started over the  last few months.  His primary care     provider was questioning if it could be related to his diabetes.  Although some     patients do have some edema with insulin it is doubtful that this is the cause     especially given the increased swelling on the right leg versus the left.  The     patient is encouraged to follow-up with his primary care provider for reevaluat    ion.  He does not currently take a diuretic that might improve the swelling.            Most Recent Lab Findings      Most Recent HGA1C      There has been improvement in the A1c.  An A1c collected on Ngozi 3, 2020 was     8.6% which is down from 9.1% in December 2019            Item Value  Date Time             Hemoglobin A1c 8.6 % H 6/3/20 1244             Hemoglobin A1c 9.1 % H 12/3/19 1150            A C-peptide with concurrent and fasting glucose level was collected on Ngozi 3,     2020.  This C-peptide was 0.7 ng/mL with a fasting glucose of 100 mg/dL.  This     indicates type 1 diabetes.            Item Value  Date Time             C-Peptide 0.7 ng/mL L 6/3/20 1244             Glucose Level 100 mg/dL H 6/3/20 1244            Diabetes Type:  Type 1 (Labs indicate the patient has transition from type 2     diabetes to type I)      Diabetes Complications:  Nephropathy (Of the feet)      Dietary Habits:  3 Meals daily, Sugary Drinks (pepsi once per day; doesn't     tolerate diet drinks)      BG Monitoring:  Meter      Frequency:  Times per day (1-2)      Blood Glucose Range:        He reports glucose levels ranged from             PAST,FAMILY,   Past Medical History      Past Medical History:  Arthritis, Hypertension            Past Surgical History      Past Surgical History:  Cataract Surgery (Bilateral), Spinal Surgery (Lumbar     laminectomy)      Other Past Surgical History      LASEK surgery            Social History      Marrital Status:        Lives independently:  No (Son lives with you)      Number of Children:  3       Occupation:  Retired            Tobacco Use      Smoking status:  Former smoker            Vaping      Currently Vaping:  No            Alcohol Use      None            Substance Use      Substance Use:  Denies Use            Review of Sytems      General:  No Appetite Changes; Fatigue, Weight Loss (Lost 40lbs over 6 or 7     months July - March got down to 205#), Weight Gain (He is regained 40 pounds     since of March of this year)      Eyes:  Positive for Blurred Vision, Positive for Corrective Lenses; Negative for    Vision Changes      Cardiovascular:  No Chest Pain, No Palpitations      Gastrointestinal:  Constipation, Diarrhea; No Nausea/Vomiting      Integumentary:  No Lesions, No Rash, No Wounds      Neurologic:  Extremity Pain (feet and low back), Numbness (feet), Tingling     (feet)      Psychiatric:  No Anxiety, No Depression      Endocrine:  Hypoglycemia; No Hypo Unawareness, No Nocturnal Hypo; Polyuria; No     Polyphagia, No Polydipsia            Exam      General:  Awake and Alert, Appropriately dressed/groomed, No Acute Distress,       Eyes:  Sclera Non-Icteric, EOM Intact, Eyelids without swelling,       Cardiovascular:  No Peripheral Edema, Normal Chest Appearance, Hear Tones Normal    S1 S2,       Musculoskeletal:  Steady Gait, Normal Strength, Normal ROM,       Respiratory:  No Respiratory Distress, No Cyanosis, No use of Accessory Musc,       Skin:  No Blisters, No Cuts\Scrapes, No Acanthosis Nigricans, No DM Dermopathy,     No Fungus, No NLD, No Rash, No Vitiligo      Psychiatric:  Appropriate Affect, Intact Judgement, Oriented x 3,             Impression      Type 2 diabetes uncontrolled with neuropathy, arthritis, hypertension, obesity            Diagnosis      Type 1 diabetes mellitus with hyperglycemia - E10.65            Notes      Renewed Medications      * GLUCOSE BLOOD TEST STRIPS (Test Strips) 1 EACH STRIP: EACH XX QID #150         Instructions: Test Blood Glucose 4 times each day  and as needed.         Dx: Type 1 diabetes mellitus with hyperglycemia - E10.65      New Referrals      * Diabetic Self Mgmt Education, Routine         [facility] with Diabetes Self Managment Ed         Up to 10 hrs of Individual      Special Instructions::          Dx: Type 1 diabetes mellitus with hyperglycemia - E10.65            Plan      The patient expresses interest in an insulin pump.  Given his most recent     findings of a of C-peptide with fasting glucose he should qualify for an insulin    pump.  It was explained to the patient that if he wants a continuous glucose     sensor he must test and record his glucose levels a minimum of 4 times a day.      The diabetes nurse educator was consulted for a pre-pump evaluation and     assessment to determine if patient understands the responsibilities involved     with using insulin pump therapy.  We will process documents on the patient if he    is agreeable after meeting with the nurse.            Pain Plan      Follow-up with PCP/Pain Management            Topics Patient Counseled on      Meds, Monitoring, Other (Diagnosis)            Referrals      Diabetes Education            Electronically signed by MENA SYED  06/21/2020 22:30       Disclaimer: Converted document may not contain table formatting or lab diagrams. Please see [employer] for the authenticated document.

## 2021-05-28 NOTE — PROGRESS NOTES
Patient: REGGIE SANTIAGO     Acct: [id number]     Report: #[id number]  UNIT #: [id number]     : 1945    Encounter Date:2021  PRIMARY CARE: ADWOA BLACKMON  ***Home***  --------------------------------------------------------------------------------------------------------------------  Date of Encounter      2021            Chief Complaint      DIABETES MELLITUS TYPE I            Referring Providers/Copies To      Referring Provider:  ADWOA BLACKMON      Copies To:   ADWOA BLACKMON            Allergies      Coded Allergies:             PREDNISONE (Verified  Allergy, Unknown, 19)            Medications      Last Reconciled on 21 8:30 pm by MENA SYED      Insulin Regular Human Rec (HumuLIN R 500 VIAL) 500 Unit/1 Ml Vial      200 UNITS SUBQ QDAY for 30 Days, #1 VIAL 5 Refills         Prov: MENA SYED         21       Lisinopril* (Lisinopril*) 40 Mg Tablet      40 MG PO QDAY, #30 TAB 0 Refills         Reported         20       Simvastatin (Simvastatin*) 20 Mg Tablet      20 MG PO HS, #30 TAB 0 Refills         Reported         20       Alcohol Antiseptic Pads (Alcohol Swabs) 1 Med.pad Med..pad      MED.PAD TOPICAL BID, #200 0 Refills         Prov: Mayur Finley         19       Atenolol (ATENOLOL) 25 Mg Tablet      25 MG PO QDAY, #30 TAB 0 Refills         Prov: Mayur Finley         19            Vital Signs      Height 5 ft 8.5 in / 173.99 cm      Weight 240 lbs 8.350 oz / 109.1 kg      BSA 2.22 m2      BMI 36.0 kg/m2      Temperature 99.3 F / 37.39 C - Temporal      Pulse 71      Respirations 22      Blood Pressure 136/72 Sitting, Left Arm      Pulse Oximetry 97%, room air      Blood Glucose Value:  335 (Tandem)            Eye Exam      When was your last eye exam?:        Several Years Ago            Pain Score      Experiencing any pain?:  No            Preventative      Hx Influenza Vaccination:  Yes      Date Influenza Vaccine Given:  Oct 1,  2019      Influenza Vaccine Declined:  Yes      Have You Had 2 or More Falls i:  No      Have You Had a Fall with an In:  No      Hx Pneumococcal Vaccination:  No      Encouraged to follow-up with:  PCP regarding preventative exams.      Chart initiated by:      Lilly Nelson MA            HPI - Diabetes      This patient is seen in the office today for follow-up evaluation for insulin     pump management.  He is a 75-year-old male patient with a history of type 1     diabetes complicated by diabetic neuropathy.  Patient was recently started on a     tandem insulin pump with a Dexcom continuous glucose sensor.  He is using     Novolin R insulin in his pump.  His device was uploaded and the records were     reviewed with the patient.            The pump record indicates an average sensor glucose of 279 mg/dL with a high of     400 mg/dL and a low of 65 mg/dL.  100% of his glucose levels are above target.      He is using approximately 165 units of insulin each day.  His best glucose     levels are during the overnight hours and then they become markedly elevated     during the daytime.  Hypoglycemia occurred on one rare event during this 14-day     period of time.            Because of the patient's high volume of insulin use he is consuming his pump     supplies rapidly.  He states he has been refilling his cartridges which is not     recommended by the  as this can cause an appropriate insulin     delivery.  This was explained to the patient and he was told he must stop this     behavior.            Most Recent Lab Findings      Most Recent HGA1C      His most recent A1c is 9.6% indicating uncontrolled type 2 diabetes.  This is an    increase in his A1c from a prior result of 8.4% in September 2020.            Item Value  Date Time             Hemoglobin A1c 9.6 % H 12/10/20 1504            Item Value  Date Time             Hemoglobin A1c 8.4 % H 9/10/20 1115            Urine microalbumin collected on  December 10, 2020 was within normal limits.            Item Value  Date Time             Urine Random Microalbumin <12.0 mg/L 12/10/20 1504            Diabetes Type:  Type 1      Diabetes Complications:  Neuropathy (He reports neuropathy of his feet which is     unchanged from a prior appointment)      Hospitalizations 2nd to DM?:  No      ER/911 Secondary to DM:  No      Dietary Habits:  3 Meals daily, Snacks, Diet Drinks      Exercise:  None      BG Monitoring:  CGM (Dexcom)      Frequency:  Times per day (Continuous)      Blood Glucose Range:        Sensor glucose averages 279 mg/dL            PAST,FAMILY,   Past Medical History      Past Medical History:  Arthritis, COPD (With emphysema), Hypertension            Past Surgical History      Past Surgical History:  Cataract Surgery, Spinal Surgery            Social History      Lives independently:  No (Son lives with patient)      Occupation:  Retired            Tobacco Use      Smoking status:  Former smoker            Alcohol Use      None            Substance Use      Substance Use:  Denies Use            Review of Sytems      General:  No Appetite Changes, No Fatigue, No Weight Loss, No Weight Gain, No     Weakness      Eyes:  No Blurred Vision; Corrective Lenses; No Vision Changes, No Vision Loss      Cardiovascular:  No Chest Pain, No Palpitations, No Peripheral edema      Gastrointestinal:  No Constipation, No Diarrhea, No Nausea/Vomiting      Integumentary:  No Lesions, No Rash, No Wounds      Neurologic:  No Extremity Pain, No Numbness; Tingling (Feet up to knee); No     Headache, No Dizziness      Psychiatric:  No Anxiety, No Depression, No Stress      Endocrine:  No Hypoglycemia, No Hypo Unawareness, No Nocturnal Hypo; Polyuria,     Polyphagia, Polydipsia      ENT:  No Hearing loss, No Sinusitis, No Difficulty swallowing      Respiratory:  Shortness of breath; No Wheezing, No Cough      Genitourinary:  No UTI, No Vaginal yeast infections, No Difficulty  urinating, No    Incontinence      Musculoskeletal:  No Joint pain, No Muscle pain; Back pain            Exam      Constitutional:  Awake and Alert, Appropriately dressed/groomed; Not Acutely     Distressed      Eyes:  No Scleral Icterus; Intact EOM      Cardiovascular:  No Peripheral Edema; Normal Chest Appearance      Musculoskeletal:  Steady Gait (He uses a cane for assistance with ambulation),     Normal Strength, Normal ROM,       Respiratory:  No Respiratory Distress, No Cyanosis, No Accessory Muscle use      Skin:  No Blisters, No Cuts\Scrapes, No Acanthosis Nigricans, No DM Dermopathy,     No Fungus, No NLD, No Rash, No Vitiligo      Psychiatric:  Appropriate Affect, Intact Judgement, Oriented x 3,       ENMT:  Nose/ears normal; No Good dentition; Normal hearing      Extremities:  No Cyanosis, No Edema; Normal temperature; No Deformity            Impression      Type 1 diabetes uncontrolled with diabetic neuropathy, arthritis, COPD,     hypertension, obesity            Diagnosis      Type 1 diabetes mellitus with hyperglycemia - E10.65            Notes      New Medications      * Insulin Regular Human Rec (HumuLIN R 500 VIAL) 500 UNIT/1 ML VIAL: 200 UNITS       SUBQ QDAY 30 Days #1         Instructions: use up to 200 units per day per insulin pump         Dx: Type 1 diabetes mellitus with hyperglycemia - E10.65      Discontinued Medications      * Insulin Human Regular (novoLIN R VIAL) 100 UNIT/1 ML VIAL: 150 UNITS SUBQ QDAY      #1         Instructions: per insulin pump            Plan      The following changes were made to his pump settings.  The 8 AM basal rate of     5.5 was increased to 6.0 units/h.  Additionally, the 1-5 carbohydrate ratio was     reduced to 1-3 around the clock.  In regards to the patient's refilling his     insulin cartridges he was advised that he cannot do this.  He was provided a box    of cartridges to help him until we can increase the supplies.  Additionally, we     will  submit a prescription for Humulin R U500 insulin.  Historically, the     patient has not been able to afford other insulins.  Because of this we will     attempt to enroll the patient in a patient assistance program for the     concentrated insulin.  The patient will return to the office if this is improved    so his settings and the pump can be adjusted.  In the meantime he is to come     back to the office if he runs out of supplies so that additional samples could     be provided.  Otherwise, he will be scheduled for follow-up appointment in 6     weeks.            Pain Plan      Pain Zero Today            Electronically signed by MENA SYED  01/23/2021 20:41       Disclaimer: Converted document may not contain table formatting or lab diagrams. Please see [employer] for the authenticated document.

## 2021-05-28 NOTE — PROGRESS NOTES
Patient: REGGIE SANTIAGO     Acct: [id number]     Report: #[id number]  UNIT #: [id number]     : 1945    Encounter Date:2020  PRIMARY CARE: ADWOA BLACKMON  ***ISignsushma***  --------------------------------------------------------------------------------------------------------------------  Date of Encounter      2020            Chief Complaint      DIABETES MELLITUS TYPE I            Referring Providers/Copies To      Referring Provider:  ADWOA BLACKMON      Copies To:   ADWOA BLACKMON            Allergies      Coded Allergies:             PREDNISONE (Verified  Allergy, Unknown, 19)            Medications      Last Reconciled on 8/3/20 10:17 pm by MENA SYED      Glucose Blood Test Strips (Test Strips) 1 Each Strip      EACH XX QID, #150 3 Refills         Prov: MENA SYED         20       Insulin Lispro (HumaLOG KWIKPEN 100 UNITS/ML) 100 Units/Ml Insuln.pen      25 UNITS SUBQ TID INSULIN, #1 BOX 0 Refills         Reported         20       Insulin Glargine,Hum.rec.anlog (Toujeo Max Solostar) 300 Unit/1 Ml Insuln.pen      120 UNITS SUBQ QDAY for 30 Days, #1 INSULN.PEN         Reported         20       Lisinopril* (Lisinopril*) 10 Mg Tablet      10 MG PO QDAY, #30 TAB 0 Refills         Reported         20       Cyanocobalamin (Vitamin B-12) Inj (Cyanocobalamin Inj) Unknown Strength Vial      IM WE, VIAL         Reported         20       Simvastatin (Simvastatin*) 20 Mg Tablet      20 MG PO HS, #30 TAB 0 Refills         Reported         20       Alcohol Antiseptic Pads (Alcohol Swabs) 1 Med.pad Med..pad      MED.PAD TOPICAL BID, #200 0 Refills         Prov: Mayur Finley         19       Syringe Insulin w Needle 1 ml (Insulin Syringe with Needle, 1 ml) 1 Each     Disp.syrin      SYRINGE XX BID, #100 0 Refills         Prov: Mayur Finley         19       Atenolol (ATENOLOL) 25 Mg Tablet      25 MG PO QDAY, #30 TAB 0 Refills         Prov:  Mayur Finley         6/19/19            Vital Signs      Height 5 ft 8.5 in / 173.99 cm      Weight 244 lbs 7.842 oz / 110.9 kg      BSA 2.24 m2      BMI 36.6 kg/m2      Respirations 20      Blood Pressure 140/72 Sitting, Right Arm            Eye Exam      When was your last eye exam?:        2 YEARS AGO            Pain Score      Experiencing any pain?:  Yes      Pain Scale Used:  Numerical      Pain Intensity:  8      Pain Comment:        Back Pain            Preventative      Hx Influenza Vaccination:  Yes      Date Influenza Vaccine Given:  Oct 1, 2019      Influenza Vaccine Declined:  No      Have You Had 2 or More Falls i:  No      Have You Had a Fall with an In:  No      Hx Pneumococcal Vaccination:  Yes      Encouraged to follow-up with:  PCP regarding preventative exams.      Chart initiated by:      Lilly Nelson MA            HPI - Diabetes      This patient is seen in the office today for follow-up evaluation for his     medication management.  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  The patient has previously     requested an insulin pump and labs were collected to see if he would qualify     with Medicare guidelines.  He does indeed qualify however the patient states     that when he spoke with the representative from the pump company the cost was     more than he could afford which is unusual given that he has Medicare insurance.     In the meantime if he is interested in a continuous glucose sensor if it is     affordable but he would like me to follow-up regarding the pump and sensor to     see if he might be able to get both if the cost can be clarified.             In the meantime the patient is currently taking 120 units of Toujeo once a day     and Humalog 35 units 3 times a day with meals.  He was previously taking 25     units 3 times a day with meals but his primary care provider recently increased     it to the dose of  35.  He admits that he was not taking the insulin as     prescribed.            Most Recent Lab Findings      Most Recent HGA1C      Most recent A1c was collected on June 17, 2020 was 8.9% indicating uncontrolled     type 1 diabetes.            Item Value  Date Time             Hemoglobin A1c 8.9 % H 6/17/20 1120            The C-peptide and concurrent fasting glucose were collected on Ngozi 3, 2020.      Results indicate type 1 diabetes.            Item Value  Date Time             C-Peptide 0.7 ng/mL L 6/3/20 1244             Glucose Level 100 mg/dL H 6/3/20 1244            Diabetes Type:  Type 1 (Labs indicate the patient has transition from type 2     diabetes to type I)      Diabetes Complications:  Nephropathy (Of the feet)      Dietary Habits:  3 Meals daily, Sugary Drinks (pepsi once per day; doesn't     tolerate diet drinks)      BG Monitoring:  Meter      Frequency:  Times per day (4 per glucose log)      Blood Glucose Range:        Glucose log indicates widely fluctuating glucose levels from 86 mg/dL to      as high as 323 mg/dL.  Most glucose levels are above 175 mg/dL.            PAST,FAMILY,   Past Medical History      Past Medical History:  Arthritis, Hypertension            Past Surgical History      Past Surgical History:  Cataract Surgery (Bilateral), Spinal Surgery (Lumbar     laminectomy)      Other Past Surgical History      LASEK surgery            Social History      Marrital Status:        Lives independently:  No (Son lives with you)      Number of Children:  3      Occupation:  Retired            Tobacco Use      Smoking status:  Former smoker            Vaping      Currently Vaping:  No            Alcohol Use      None            Substance Use      Substance Use:  Denies Use            Review of Sytems      General:  No Appetite Changes; Fatigue; No Weight Loss, No Weight Gain      Eyes:  Negative for Blurred Vision; Positive for Corrective Lenses, Positive for    Vision Changes       Cardiovascular:  No Chest Pain, No Palpitations      Gastrointestinal:  Constipation, Diarrhea; No Nausea/Vomiting      Integumentary:  No Lesions, No Rash, No Wounds      Neurologic:  No Extremity Pain; Numbness (Feet and Legs), Tingling (feet and     legs)      Psychiatric:  No Anxiety, No Depression      Endocrine:  No Hypoglycemia, No Hypo Unawareness, No Nocturnal Hypo; Polyuria,     Polyphagia; No Polydipsia            Exam      General:  Awake and Alert, Appropriately dressed/groomed, No Acute Distress,       Eyes:  Sclera Non-Icteric, EOM Intact, Eyelids without swelling,       Cardiovascular:  No Peripheral Edema, Normal Chest Appearance, Hear Tones Normal    S1 S2,       Musculoskeletal:  Steady Gait, Normal Strength, Normal ROM,       Respiratory:  No Respiratory Distress, No Cyanosis, No use of Accessory Musc,       Skin:  No Blisters, No Cuts\Scrapes, No Acanthosis Nigricans, No DM Dermopathy,     No Fungus, No NLD, No Rash, No Vitiligo      Psychiatric:  Appropriate Affect, Intact Judgement, Oriented x 3,             Impression      Type 2 diabetes uncontrolled with nephropathy, hypertension, arthritis            Diagnosis      TYPE 1 DIABETES MELLITUS WITH HYPERGLYCEMIA - E10.65            TYPE 1 DIABETES MELLITUS WITH DIABETIC NEPHROPATHY - E10.21            Plan      Our office will contact the insulin pump company to determine the actual cost of    the insulin pump as well as the continuous glucose sensor.  If it is affordable     we will proceed with getting the device for the patient.  If not we will at     least try to get the continuous glucose sensor ordered for him.            In the meantime the patient will continue his Toujeo at 120 units every day and     he will take Humalog 25, 30, or 35 units based on small, medium, or large sized     meal.  Additionally he will add correction using Humalog of 3 units for glucose     levels between 151 and 175 and then increase by 1 unit for every 25  mg/dL     greater thereafter.  Written copies were provided for the patient.              We will follow-up with the information regarding the pump and will schedule the     patient appropriately.  He is to continue monitoring his glucose levels at least    4 times a day and record them for review at his follow-up appointment.            Pain Plan      Follow-up with PCP/Pain Management            Electronically signed by MENA SYED  08/03/2020 22:18       Disclaimer: Converted document may not contain table formatting or lab diagrams. Please see [employer] for the authenticated document.

## 2021-05-28 NOTE — PROGRESS NOTES
Patient: REGGIE SANTIAGO     Acct: [id number]     Report: #[id number]  UNIT #: [id number]     : 1945    Encounter Date:2020  PRIMARY CARE: ADWOA BLACKMON  ***Signed***  --------------------------------------------------------------------------------------------------------------------  Encounter Date      2020            Reason for Visit      This patient is seen in the office today for follow-up evaluation for diabetes     medication management.  He is a 74-year-old male patient with a history of type     2 diabetes uncontrolled.  He is currently managed using Toujeo 120 units every     day and Humalog insulin at mealtime taking 5, 8, or 12 units of insulin for     small, medium, or large sized meals based on carbohydrate content.      Additionally, he uses Humalog for correction of glucose levels greater than 150     mg/dL starting with 2 units and going up by 1 unit increments.  He also takes     metformin thousand milligrams twice a day.              He brings glucose logs to the office today demonstrating glucose levels ranging     from 91 mg/dL to 489 mg/dL.  There was a brief interval of time and the patient     ran out of glucose test strips and was unable to test.  The patient is taking 25    and 25 units of insulin with meals using the previously described carb dosing     and correction.  He denies any episodes or feelings of hypoglycemia.            Lab Results      There is no new A1c available.  The most recent was collected in 2019     and was 9.1%.            Item Value  Date Time             Hemoglobin A1c 9.1 % H 12/3/19 1150            History            History: The patient states he has been having some problems with dizziness and     is seeing his cardiologist for evaluation.  Otherwise, he denies any changes to     his health history.            Allergies/Medications      Allergies:        Coded Allergies:             PREDNISONE (Verified  Allergy, Unknown,  6/16/19)      Medications    Last Reconciled on 2/22/20 3:58 pm by MENA SYED      Insulin Lispro (HumaLOG KWIKPEN 100 UNITS/ML) 100 Units/Ml Insuln.pen      25 UNITS SUBQ TID INSULIN, #1 BOX 0 Refills         Reported         2/22/20       Insulin Glargine,Hum.rec.anlog (Toujeo Max Solostar) 300 Unit/1 Ml Insuln.pen      120 UNITS SUBQ QDAY for 30 Days, #1 INSULN.PEN         Reported         2/22/20       Lisinopril* (Lisinopril*) 10 Mg Tablet      10 MG PO QDAY, #30 TAB 0 Refills         Reported         2/22/20       Cyanocobalamin (Vitamin B-12) Inj (Cyanocobalamin Inj) Unknown Strength Vial      IM WE, VIAL         Reported         2/22/20       Simvastatin (Simvastatin*) 20 Mg Tablet      20 MG PO HS, #30 TAB 0 Refills         Reported         2/22/20       Alcohol Antiseptic Pads (Alcohol Swabs) 1 Med.pad Med..pad      MED.PAD TOPICAL BID, #200 0 Refills         Prov: Mayur Finley         6/19/19       Syringe Insulin w Needle 1 ml (Insulin Syringe with Needle, 1 ml) 1 Each     Disp.syrin      SYRINGE XX BID, #100 0 Refills         Prov: Mayur Finley         6/19/19       Atenolol (ATENOLOL) 25 Mg Tablet      25 MG PO QDAY, #30 TAB 0 Refills         Prov: Mayur Finley         6/19/19       metFORMIN HCl (metFORMIN HCl) 500 Mg Tablet      1000 MG PO BID, #120 TAB 0 Refills         Prov: Mayur Finley         6/19/19            Quality Measures      Current yr A1c result 7-9%:  Yes            Impression      Type 2 diabetes uncontrolled with diabetic neuropathy, hypertension, urinary     frequency, arthritis            Plan      At this time the patient was increase his mealtime insulin to 8, 10, or 15 units    with meals based on carbohydrate amount.  We discussed strategies to improve     carbohydrate counting.  We will consider adding Actos if the patient is unable     to achieve better glucose control.  He is to continue monitoring his glucose     levels 3-4 times every day.  He will be  scheduled for follow-up appointment 1     month.  No other changes were made to his medication management at this time.            Total time spent with patient was 10 minutes of which half of that time was     spent counseling the patient regarding carbohydrate counting            Patient Education      Yes      ACO BMI High above 25:  Counseling Given, Encourage dietary changes            PREVENTION      Hx Influenza Vaccination:  Yes      Date Influenza Vaccine Given:  Oct 1, 2019      Influenza Vaccine Declined:  No      Hx Pneumococcal Vaccination:  No      Encouraged to follow-up with:  PCP regarding preventative exams.            Electronically signed by MENA SYED  03/25/2020 10:43       Disclaimer: Converted document may not contain table formatting or lab diagrams. Please see [employer] [facility] for the authenticated document.

## 2021-05-28 NOTE — PROGRESS NOTES
Patient: REGGIE SANTIAGO     Acct: [id number]     Report: #[id number]  UNIT #: [id number]     : 1945    Encounter Date:2020  PRIMARY CARE: ADWOA BLACKMON  ***Home***  --------------------------------------------------------------------------------------------------------------------  Date of Encounter      2020            Chief Complaint      DIABETES MELLITUS TYPE I            Referring Providers/Copies To      Referring Provider:  ADWOA BLACKMON      Copies To:   ADWOA BLACKMON            Allergies      Coded Allergies:             PREDNISONE (Verified  Allergy, Unknown, 19)            Medications      Last Reconciled on 20 9:13 pm by MENA SYED      Insulin Lispro (HumaLOG KWIKPEN 200 UNITS/ML) 200 Unit/1 Ml Insuln.pen      200 UNITS SUBQ QDAY for 30 Days, #10 INSULN.PEN 5 Refills         Prov: MENA SYED         20       Glucose Blood Test Strips (Test Strips) 1 Each Strip      EACH XX QID, #150 3 Refills         Prov: MENA SYED         20       Lisinopril* (Lisinopril*) 10 Mg Tablet      10 MG PO QDAY, #30 TAB 0 Refills         Reported         20       Simvastatin (Simvastatin*) 20 Mg Tablet      20 MG PO HS, #30 TAB 0 Refills         Reported         20       Alcohol Antiseptic Pads (Alcohol Swabs) 1 Med.pad Med..pad      MED.PAD TOPICAL BID, #200 0 Refills         Prov: Mayur Finley         19       Atenolol (ATENOLOL) 25 Mg Tablet      25 MG PO QDAY, #30 TAB 0 Refills         Prov: Mayur Finley         19            Vital Signs      Height 5 ft 8.5 in / 173.99 cm      Weight 246 lbs 7.588 oz / 111.8 kg      BSA 2.25 m2      BMI 36.9 kg/m2      Temperature 98.4 F / 36.89 C - Temporal      Pulse 81      Respirations 18      Blood Pressure 128/86 Sitting, Right Arm      Pulse Oximetry 97%, room air      Blood Glucose Value:  246 (Tandem)            Eye Exam      When was your last eye exam?:        Several Years Ago             Pain Score      Experiencing any pain?:  Yes      Pain Scale Used:  Numerical      Pain Intensity:  2      Pain Comment:        Knee; Lower Back            Preventative      Hx Influenza Vaccination:  Yes      Date Influenza Vaccine Given:  Oct 1, 2019      Influenza Vaccine Declined:  No      Have You Had 2 or More Falls i:  No      Have You Had a Fall with an In:  No      Hx Pneumococcal Vaccination:  No      Encouraged to follow-up with:  PCP regarding preventative exams.      Chart initiated by:      Lilly Nelson MA            HPI - Diabetes      This patient is seen in the office today for evaluation for insulin pump     management.  He is a 74-year-old male patient with a history of type 1 diabetes     complicated by diabetic neuropathy.  The patient was recently started on a     tandem insulin pump with a Dexcom continuous glucose sensor.  This is his first     visit since being started on the pump.            The insulin pump was uploaded for a 14-day report and reviewed.  The pump report    indicates an average blood glucose of 278 mg/dL with a high of 600 and a low of     40.  The sensor glucose averages 261 mg/dL with a high of 400 and a low of 58.      19% of glucose levels are within target while 81% are above target.  The patient    is using control IQ technology with the pump 58% of the time.  He is using     approximately 170 units of insulin each day.  He is using Novolin R insulin in     the pump.  In the control IQ feature is giving multiple boluses over top of     basal to try to achieve glucose control however there are limitations to the     amount of insulin the pump will give and control mode.  This is most likely the     reason why the patient cannot achieve glycemic control.            Most Recent Lab Findings      Most Recent HGA1C      The most recent A1c was collected in September and was 8.4% indicating     uncontrolled type 1 diabetes.            Item Value  Date Time              Hemoglobin A1c 8.4 % H 9/10/20 1115            Urine microalbumin is mildly elevated            Item Value  Date Time             Urine Random Microalbumin 49.4 mg/L H 7/2/19 1105            Diabetes Type:  Type 1      Diabetes Complications:  Neuropathy (Of the feet)      Hospitalizations 2nd to DM?:  No      ER/911 Secondary to DM:  No      Dietary Habits:  3 Meals daily, Snacks      Exercise:  None      BG Monitoring:  CGM      Frequency:  Times per day (Continuous)      Blood Glucose Range:        Sensor glucose averages 261 mg/dL            PAST,FAMILY,   Past Medical History      Past Medical History:  Arthritis, Hypertension            Past Surgical History      Past Surgical History:  Cataract Surgery, Spinal Surgery            Social History      Lives independently:  No (Son lives with you)      Occupation:  Retired            Tobacco Use      Smoking status:  Former smoker            Alcohol Use      None            Substance Use      Substance Use:  Denies Use            Review of Sytems      General:  No Appetite Changes; Fatigue; No Weight Loss, No Weight Gain      Eyes:  Negative for Blurred Vision; Positive for Corrective Lenses; Negative for    Vision Changes      Cardiovascular:  No Chest Pain, No Palpitations      Gastrointestinal:  No Constipation, No Diarrhea, No Nausea/Vomiting      Integumentary:  No Lesions, No Rash, No Wounds      Neurologic:  Extremity Pain, Numbness, Tingling      Psychiatric:  No Anxiety, No Depression      Endocrine:  No Hypoglycemia, No Hypo Unawareness, No Nocturnal Hypo; Polyuria;     No Polyphagia, No Polydipsia            Exam      General:  Awake and Alert, Appropriately dressed/groomed, No Acute Distress,       Eyes:  Sclera Non-Icteric, EOM Intact, Eyelids without swelling,       Cardiovascular:  No Peripheral Edema, Normal Chest Appearance, Hear Tones Normal    S1 S2,       Musculoskeletal:  Steady Gait, Normal Strength, Normal ROM,       Respiratory:  No  Respiratory Distress, No Cyanosis, No use of Accessory Musc,       Skin:  No Blisters, No Cuts\Scrapes, No Acanthosis Nigricans, No DM Dermopathy,     No Fungus, No NLD, No Rash, No Vitiligo      Psychiatric:  Appropriate Affect, Intact Judgement, Oriented x 3,             Impression      Type 1 diabetes uncontrolled with neuropathy, arthritis, hypertension            Diagnosis      TYPE 2 DIABETES MELLITUS WITH HYPERGLYCEMIA - E11.65            Notes      New Medications      * Insulin Lispro (HumaLOG KWIKPEN 200 UNITS/ML) 200 UNIT/1 ML INSULN.PEN: 200       UNITS SUBQ QDAY 30 Days #10         Instructions: use 600 units in insulin pump every 3 days         Dx: TYPE 2 DIABETES MELLITUS WITH HYPERGLYCEMIA - E11.65      Discontinued Medications      * Syringe Insulin w Needle 1 ml (Insulin Syringe with Needle, 1 ml) 1 EACH       DISP.SYRIN: SYRINGE XX BID #100      * INSULIN LISPRO (HumaLOG VIAL) 100 UNITS/ML VIAL          Sample - Qty 1         Instructions: Take up to 25 units 3 times a day with meals      * Insulin Degludec (Tresiba Flextouch U-200) 200 UNIT/1 ML INSULN.PEN          Sample - Qty 2         Instructions: Take 120 units each day      * INSULIN GLARGINE,HUM.REC.ANLOG (Toujeo Max Solostar) 300 UNIT/1 ML INSULN.PEN          Sample - Qty 2         Instructions: Take 120 units per day      * INSULIN GLARGINE,HUM.REC.ANLOG (Toujeo Solostar) 300 UNIT/1 ML INSULN.PEN          Sample - Qty 2         Instructions: Take 120 units per day         Dx: TYPE 2 DIABETES MELLITUS WITH HYPERGLYCEMIA - E11.65            Plan      Because the patient is requiring large amounts of insulin in his insulin pump we    will attempt to try to acquire Humalog insulin U200 concentrated insulin for use    in his pump.  In the meantime the current basal rate of 4 units/h was increased     to 5.5 units/h from 8 AM until 12 AM.  We turned off the control IQ feature of     the pump as the pump is limited in the amount of basal  insulin that can be     delivered in a day.  Once the patient receives the concentrated insulin we will     evaluate using the control IQ feature again.  In the meantime the patient will     continue monitoring his glucose levels using his continuous glucose sensor.  If     he receives the concentrated insulin he is to call the office to be seen for     adjustments to the pump settings for use with the concentrated insulin.  The pat    ient will be scheduled for a follow-up appointment otherwise in 1 month.            Pain Plan      Follow-up with PCP/Pain Management            Topics Patient Counseled on      Other (Pump use)            Electronically signed by MENA SYED  11/29/2020 21:13       Disclaimer: Converted document may not contain table formatting or lab diagrams. Please see [employer] [facility] for the authenticated document.

## 2021-06-06 NOTE — PROGRESS NOTES
Progress Note      Patient Name: Aleksandar Marks   Patient ID: [id number]   Sex: Male   YOB: 1945    Primary Care Provider: Demar DUARTE   Referring Provider: Demar DUARTE    Visit Date: May 20, 2021    Provider: Sin Gillis MD   Location: [facility]   Location Address: [address] [place] [address]  [id number]   Location Phone: [phone]          Chief Complaint  · Right knee pain      History Of Present Illness  Aleksandar Marks is a 75 year old /White male who presents today to [facility].      The patient presents here today for follow up evaluation of right knee pain, osteoarthritis. The patient states the right knee is worse than the left knee. He has failed conservative treatment and wishes to discuss other options today. He has previously had aspirations and injection that only provided relief for a couple days. The patient is a Type 1 diabetic and has COPD.       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; Shortness of Breath; Sinus trouble; Skin Disease         Past Surgical History  Back surgery; Colonoscopy; Joint Surgery; Knee surgery         Medication List  Accu-Chek Irina Control Soln miscellaneous solution; Accu-Chek Irina Plus Meter miscellaneous misc; Accu-Chek Irina Plus test strp miscellaneous strip; Accu-Chek Softclix Lancets miscellaneous misc; albuterol sulfate 90 mcg/actuation inhalation HFA aerosol inhaler; alcohol swabs topical pads, medicated; aspirin 81 mg oral tablet,delayed release (DR/EC); atenolol 25 mg oral tablet; cetirizine 10 mg oral tablet; cyanocobalamin (vitamin B-12) 1,000 mcg/mL injection solution; fluticasone propionate 50 mcg/actuation nasal spray,suspension; hydrochlorothiazide 25 mg oral tablet; lisinopril 40 mg oral tablet; Novolin R Regular U-100  "Insuln 100 unit/mL injection solution; pen needle, diabetic 31 gauge x 1/4\" miscellaneous needle; simvastatin 20 mg oral tablet; Stiolto Respimat 2.5-2.5 mcg/actuation inhalation mist         Allergy List  NO KNOWN DRUG ALLERGIES       Allergies Reconciled  Family Medical History  Stroke; Heart Disease; -Father's Family History Unknown; -Mother's Family History Unknown; Family history of cancer; Family history of stroke; Family history of heart disease         Social History  Alcohol (Light); lives with children; Retired; Tobacco (Former);          Review of Systems  · Constitutional  o Denies  o : fever, chills, weight loss  · Cardiovascular  o Denies  o : chest pain, shortness of breath  · Gastrointestinal  o Denies  o : liver disease, heartburn, nausea, blood in stools  · Genitourinary  o Denies  o : painful urination, blood in urine  · Integument  o Denies  o : rash, itching  · Neurologic  o Denies  o : headache, weakness, loss of consciousness  · Musculoskeletal  o Denies  o : painful, swollen joints  · Psychiatric  o Denies  o : drug/alcohol addiction, anxiety, depression      Vitals  Date Time BP Position Site L\R Cuff Size HR RR TEMP (F) WT  HT  BMI kg/m2 BSA m2 O2 Sat FR L/min FiO2 HC       05/20/2021 10:58 AM         235lbs 0oz 5'  8.5\" 35.21 2.27             Physical Examination  · Constitutional  o Appearance  o : well developed, well-nourished, no obvious deformities present  · Head and Face  o Head  o :   § Inspection  § : normocephalic  o Face  o :   § Inspection  § : no facial lesions  · Eyes  o Conjunctivae  o : conjunctivae normal  o Sclerae  o : sclerae white  · Ears, Nose, Mouth and Throat  o Ears  o :   § External Ears  § : appearance within normal limits  § Hearing  § : intact  o Nose  o :   § External Nose  § : appearance normal  · Neck  o Inspection/Palpation  o : normal appearance  o Range of Motion  o : full range of motion  · Respiratory  o Respiratory Effort  o : breathing " unlabored  o Inspection of Chest  o : normal appearance  o Auscultation of Lungs  o : no audible wheezing or rales  · Cardiovascular  o Heart  o : regular rate  · Gastrointestinal  o Abdominal Examination  o : soft and non-tender  · Skin and Subcutaneous Tissue  o General Inspection  o : intact, no rashes  · Psychiatric  o General  o : Alert and oriented x3  o Judgement and Insight  o : judgment and insight intact  o Mood and Affect  o : mood normal, affect appropriate  · Right Knee  o Inspection  o : Swelling to the right knee. Full Extension. Flexion 120. Stable to varus and valgus stress. Stable to anterior and posterior drawer. Neurovascularly intact. Positive crepitus. Positive EHL, FHL, GS, and TA. Sensation intact to light touch all 5 nerves of the foot. Positive Pulses.           Assessment  · Primary osteoarthritis of right knee     715.16/M17.11  · Right knee pain, unspecified chronicity     719.46/M25.561      Plan  · Medications  o Medications have been Reconciled  o Transition of Care or Provider Policy  · Instructions  o Reviewed the patient's Past Medical, Social, and Family history as well as the ROS at today's visit, no changes.  o Call or return if worsening symptoms.  o The above service was scribed by Kourtney Whiting on my behalf and I attest to the accuracy of the note. jsb  o Discussed the treatment options with the patient, operative vs non-operative. Discussed the risks and benefits of a right knee replacement. The patient expressed understanding and wished to proceed. Plan to follow up 2 weeks post-operatively.  o Electronically Identified Patient Education Materials Provided Electronically            Electronically Signed by: Kourtney Whiting MA -Author on May 20, 2021 01:38:59 PM  Electronically Co-signed by: Sin Gillis MD -Reviewer on May 20, 2021 09:40:23 PM

## 2021-06-21 PROBLEM — I10 HYPERTENSION, ESSENTIAL: Status: ACTIVE | Noted: 2021-01-01

## 2021-06-21 PROBLEM — R06.02 SHORTNESS OF BREATH: Status: ACTIVE | Noted: 2021-01-01

## 2021-06-22 NOTE — PROGRESS NOTES
Chief Complaint  Diabetes    Referred By: GERALD Chavez    Subjective          Aleksandar Marks presents to [facility] for follow-up insulin pump management    History of Present Illness    Visit type:  follow-up  Diabetes type:  Type 1  Current diabetes status/concerns/issues: Patient reports he has been struggling with some episodes of hypoglycemia during the overnight hours.  He also had an event where glucose levels remained significantly above 400 for an extended period of time.  The cause ended up being a bad infusion site.  Diabetes symptoms:  polydipsia  Diabetes complications:  Neuropathy (Of the feet and lower extremities)  Hypoglycemia:  Nocturnal - His reported recent episodes of severe nocturnal hypoglycemia with levels in the 40s  Hypoglycemia Symptoms:  He is awake and with feelings of hunger as well as shaking and tremors  Diabetes treatment: He is currently managed on a tandem insulin pump with a Dexcom continuous glucose sensor.  He is using Humulin R U-500 insulin in his device.  He is using approximately 55 units of insulin each day which is an equivalent dose of 275 units a day with the Humulin R U-500 insulin  Blood glucose device:  CGM - Dexcom brand  Blood glucose monitoring frequency:  Continuous per CGM  Blood glucose range/average:  The 14-day insulin pump report indicates an average sensor glucose of 218 mg/dL with a high of 400 and a low 42.  42% of glucose levels are fallen in target range between 7180 while 56% are above target 3% are below target.  He is using control IQ technology 98% of the time.  As mentioned previously the patient does have evidence of nocturnal hypoglycemia occurring on a few separate occasions.  It is also noted the patient is not always entering his carbohydrates into his pump.  This is leading to some postprandial hyperglycemia.  This was discussed with the patient.  Also, as mentioned previously, there is evidence of a bed  and fusion site with glucose levels remain persistently above 400.  Diet:  Carbohydrate Counting and Diet drinks only  Activity:  The patient states he is exercising per the patient questionnaire but he does not indicate the type of exercise    Past Medical History:   Diagnosis Date   • Arthritis    • COPD with emphysema (CMS/HCC)    • Diabetes mellitus type 1 (CMS/HCC)    • Hypertension    • Neuropathy     feet- unchanged from prior appt      Past Surgical History:   Procedure Laterality Date   • CATARACT EXTRACTION     • SPINE SURGERY      lumbar     History reviewed. No pertinent family history.  Social History     Socioeconomic History   • Marital status:      Spouse name: Not on file   • Number of children: Not on file   • Years of education: Not on file   • Highest education level: Not on file   Tobacco Use   • Smoking status: Former Smoker   • Smokeless tobacco: Never Used   Vaping Use   • Vaping Use: Never used   Substance and Sexual Activity   • Alcohol use: Not Currently   • Drug use: Never     No Known Allergies    Current Outpatient Medications:   •  aspirin (aspirin) 81 MG EC tablet, Take 81 mg by mouth Daily., Disp: , Rfl:   •  atenolol (TENORMIN) 25 MG tablet, Take 25 mg by mouth 2 (two) times a day., Disp: , Rfl:   •  cetirizine (zyrTEC) 10 MG tablet, TAKE 1 TABLET EVERY DAY, Disp: 90 tablet, Rfl: 1  •  fluticasone (FLONASE) 50 MCG/ACT nasal spray, USE 2 SPRAYS IN EACH NOSTRIL ONE TIME DAILY, Disp: 48 g, Rfl: 1  •  Insulin Regular Human (HUMULIN R U-500, CONCENTRATED, SC), Inject 250 Units under the skin into the appropriate area as directed Daily. Per pump, Disp: , Rfl:   •  lisinopril (PRINIVIL,ZESTRIL) 40 MG tablet, TAKE 1 TABLET EVERY DAY, Disp: 90 tablet, Rfl: 1  •  simvastatin (ZOCOR) 20 MG tablet, Take 1 tablet by mouth Every Night., Disp: 90 tablet, Rfl: 1    Review of Systems   Constitutional: Positive for fatigue. Negative for activity change, appetite change, fever, unexpected  "weight gain and unexpected weight loss.   HENT: Negative for congestion, ear pain, facial swelling, hearing loss, sore throat and tinnitus.    Eyes: Negative for blurred vision, double vision, redness and visual disturbance.   Respiratory: Positive for shortness of breath. Negative for cough and wheezing.    Cardiovascular: Negative for chest pain, palpitations and leg swelling.   Gastrointestinal: Positive for diarrhea. Negative for abdominal distention, constipation, nausea, vomiting, GERD and indigestion.   Endocrine: Negative for polydipsia, polyphagia and polyuria.   Genitourinary: Negative for difficulty urinating, frequency and urgency.   Musculoskeletal: Positive for arthralgias and back pain. Negative for gait problem and myalgias.   Skin: Negative for rash, skin lesions and bruise.   Neurological: Positive for numbness (feet). Negative for seizures, speech difficulty, weakness, headache and confusion.   Psychiatric/Behavioral: Positive for sleep disturbance. Negative for depressed mood and stress. The patient is not nervous/anxious.         Objective     Vitals:    06/22/21 1445   BP: 146/90   BP Location: Right arm   Patient Position: Sitting   Cuff Size: Adult   Pulse: 78   Temp: 98.6 °F (37 °C)   TempSrc: Temporal   SpO2: 97%   Weight: 113 kg (248 lb 10.9 oz)   Height: 174 cm (68.5\")   PainSc:   4   PainLoc: Back     Body mass index is 37.26 kg/m².      Physical Exam  Constitutional:       Appearance: Normal appearance. He is obese.      Comments: Obesity class II with BMI of 37.26   HENT:      Head: Normocephalic and atraumatic.      Right Ear: External ear normal.      Left Ear: External ear normal.      Nose: Nose normal.   Eyes:      Extraocular Movements: Extraocular movements intact.      Conjunctiva/sclera: Conjunctivae normal.   Pulmonary:      Effort: Pulmonary effort is normal.   Musculoskeletal:         General: Normal range of motion.      Cervical back: Normal range of motion.      " Comments: He is using a cane to assist with ambulation.  He states he is being evaluated for possible total knee replacement on the right side in July   Skin:     General: Skin is warm and dry.   Neurological:      General: No focal deficit present.      Mental Status: He is alert and oriented to person, place, and time. Mental status is at baseline.   Psychiatric:         Mood and Affect: Mood normal.         Behavior: Behavior normal.         Thought Content: Thought content normal.         Judgment: Judgment normal.         Result Review :   The following data was reviewed by: GERALD Dan on 06/22/2021:        His most recent A1c was collected by point-of-care on April 21, 2021 and was 9.2%.  This indicates uncontrolled type 1 diabetes    Most Recent A1C    HGBA1C Most Recent 12/10/20   Hemoglobin A1C 9.6 (A)   (A) Abnormal value       Comments are available for some flowsheets but are not being displayed.             A1C Last 3 Results    HGBA1C Last 3 Results 9/10/20 12/10/20   Hemoglobin A1C 8.4 (A) 9.6 (A)   (A) Abnormal value       Comments are available for some flowsheets but are not being displayed.                   Assessment:  Diagnoses and all orders for this visit:    1. Uncontrolled type 1 diabetes mellitus with hyperglycemia (CMS/[facility]) (Primary)        Plan: The following changes were made to his pump settings to prevent the nocturnal hypoglycemia:    The 12 AM basal rate of 1.0 was reduced to 0.8  A 6 AM basal rate of 1.0 was added    No other changes were made to his pump settings.  If this does not resolve the issues the patient is to call our office.  He is also strongly encouraged to enter all carbohydrates into his pump.  We also discussed troubleshooting infusion sites to prevent prolonged hyperglycemia.    The patient will continue to monitor his blood glucose levels using his Dexcom continuous glucose sensor.  If he experiences any increased frequency or severity of  hypoglycemia, or worsening hyperglycemia, he will contact the office for further instructions.          Follow Up     Return in about 6 weeks (around 8/3/2021) for Pump Eval, CGM Follow-up.    Patient was given instructions and counseling regarding his condition or for health maintenance advice. Please see specific information pulled into the AVS if appropriate.     Galina Torres, GERALD  06/22/2021

## 2021-06-29 NOTE — PROGRESS NOTES
[facility]  Cardiology progress Note    Patient Name: Aleksandar Marks  : 1945    CC: Shortness of breath    Subjective   Subjective       HPI:  Aleksandar Marks is a 75 y.o. male with history of hypertension comes in for a follow-up visit.  Blood pressure still running high.  Shortness of breath is stable.    Review of Systems:   Constitutional no fever,  no weight loss   Skin no rash   Otolaryngeal no difficulty swallowing   Cardiovascular See HPI   Pulmonary no cough, no sputum production   Gastrointestinal no constipation, no diarrhea   Genitourinary no dysuria, no hematuria   Hematologic no easy bruisability, no abnormal bleeding   Musculoskeletal no muscle pain   Neurologic no dizziness, no falls         Personal History     Social History:  reports that he has quit smoking. He has never used smokeless tobacco. He reports previous alcohol use. He reports that he does not use drugs.    Home Medications:  Current Outpatient Medications on File Prior to Visit   Medication Sig   • aspirin (aspirin) 81 MG EC tablet Take 81 mg by mouth Daily.   • atenolol (TENORMIN) 25 MG tablet Take 25 mg by mouth 2 (two) times a day.   • cetirizine (zyrTEC) 10 MG tablet TAKE 1 TABLET EVERY DAY   • fluticasone (FLONASE) 50 MCG/ACT nasal spray USE 2 SPRAYS IN EACH NOSTRIL ONE TIME DAILY   • Insulin Regular Human (HUMULIN R U-500, CONCENTRATED, SC) Inject 250 Units under the skin into the appropriate area as directed Daily. Per pump   • lisinopril (PRINIVIL,ZESTRIL) 40 MG tablet TAKE 1 TABLET EVERY DAY   • simvastatin (ZOCOR) 20 MG tablet Take 1 tablet by mouth Every Night.   • Stiolto Respimat 2.5-2.5 MCG/ACT aerosol solution inhaler INHALE 2 PUFFS BY MOUTH ONCE DAILY AT THE SAME TIME EACH DAY     No current facility-administered medications on file prior to visit.     Allergies:  No Known Allergies    Objective    Objective       Vitals:   Heart Rate:  [72-74] 74  BP: (148-154)/(74-78) 148/74  Body mass index is  37.56 kg/m².     Physical Exam:   Constitutional: Awake, alert, No acute distress    Eyes: PERRLA, sclerae anicteric, no conjunctival injection   HENT: NCAT, mucous membranes moist   Neck: Supple, no thyromegaly, no lymphadenopathy, trachea midline   Respiratory: Clear to auscultation bilaterally, nonlabored respirations    Cardiovascular: RRR, no murmurs, rubs, or gallops, palpable pedal pulses bilaterally   Gastrointestinal: Positive bowel sounds, soft, nontender, nondistended   Musculoskeletal: No bilateral ankle edema, no clubbing or cyanosis to extremities   Psychiatric: Appropriate affect, cooperative   Neurologic: Oriented x 3, strength symmetric in all extremities, Cranial Nerves grossly intact to confrontation, speech clear   Skin: No rashes.    Result Review    Result Review:  I have personally reviewed the available results from  [x]  Laboratory  [x]  EKG  [x]  Cardiology  [x]  Medications  [x]  Old records  []  Other:   Procedures  No results found for: CHOL  Lab Results   Component Value Date    TRIG 242 (H) 09/10/2020    TRIG 68 06/17/2020    TRIG 123 07/02/2019     Lab Results   Component Value Date    HDL 48 09/10/2020    HDL 61 (H) 06/17/2020    HDL 52 07/02/2019     Lab Results   Component Value Date    LDL 66 (L) 09/10/2020    LDL 68 (L) 06/17/2020    LDL 68 (L) 07/02/2019     Lab Results   Component Value Date    VLDL 48 (H) 09/10/2020    VLDL 14 06/17/2020    VLDL 25 07/02/2019             Impression/Plan  1.  Essential hypertension uncontrolled: We will change atenolol to carvedilol 12.5 mg twice daily.  Continue lisinopril 40 mg once a day.  2.  Shortness of breath stable: Recent echo shows normal left normal left ventricular systolic function.  3.  Morbid obesity: Low-fat diet regular exercise advised.  4.  Hyperlipidemia: Lipid profile reviewed shows LDL of 66.  Continue simvastatin 20 mg once a day.             Electronically signed by Suhail Dominique MD, 06/29/21, 2:10 PM EDT.

## 2021-06-30 PROBLEM — H26.9 CATARACT: Status: ACTIVE | Noted: 2021-01-01

## 2021-06-30 PROBLEM — E78.00 HIGH CHOLESTEROL: Status: ACTIVE | Noted: 2021-01-01

## 2021-06-30 PROBLEM — E10.9 TYPE 1 DIABETES MELLITUS (HCC): Status: ACTIVE | Noted: 2021-01-01

## 2021-06-30 PROBLEM — M19.90 ARTHRITIS: Status: ACTIVE | Noted: 2021-01-01

## 2021-06-30 PROBLEM — E55.9 VITAMIN D DEFICIENCY: Status: ACTIVE | Noted: 2021-01-01

## 2021-06-30 PROBLEM — M17.11 OSTEOARTHRITIS OF RIGHT KNEE: Status: ACTIVE | Noted: 2021-01-01

## 2021-06-30 PROBLEM — E53.8 VITAMIN B12 DEFICIENCY: Status: ACTIVE | Noted: 2021-01-01

## 2021-07-01 PROBLEM — J44.9 CHRONIC OBSTRUCTIVE PULMONARY DISEASE (HCC): Status: ACTIVE | Noted: 2021-01-01

## 2021-07-01 NOTE — ASSESSMENT & PLAN NOTE
Recently started Coreg 12.5 mg twice daily.  He reports medication was not at the pharmacy which cardiology had sent.  Will resend medication for patient

## 2021-07-02 NOTE — OUTREACH NOTE
Ambulatory Case Management Note    CCM Interim Update    PCP, Kun DUARTE, asked that I get pt a hospital bed for a short amount of time because pt is going to have a knee replacement 7/14. I printed off DWO, filled it out, and will have PCP sign. I also printed off verbiage needed in PCPs last note, showing need for bed. PCP will add this in his note so I can fax to [facility].     Pt's last A1C was 8.66 on 6/30/2021 (previous was 9.6 on 12/2020.) Improvement. Chart review showed that pt was seen by Galina Torres 6/22 and has another appt 8/4. Pt is on an insulin pump.    I emailed pt a request for latest log of BP and BS.      Care Evaluation    Questions/Answers      Most Recent Value   Care Gaps Addressed  Diabetic A1C   HbA1c Status  Up to Date-within defined limits   HbA1c Completion at [facility] or Other  [facility]   HbA1c Comments  8.66 on 6/30/2021   Other Patient Education/Resources   -- [ordering DME]   Medication Adherence  Medications understood   Healthy Lifestyle (Self-Efficacy)  self-reports important symptoms to medical professional, recognizes when to contact medical assistance        General & Health Literacy Assessment    Questions/Answers      Most Recent Value   Assessment Completed With  Patient   Living Arrangement  Alone   Type of Residence  Private Residence   Home Care Services  No   Equiptment Used at Home  Cane   Communication Device  No   Other Issues  Visual Impairment [wears glasses]   Bed or Wheelchair Confined  No   Difficulty Keeping Appointments  No   Adventist or Spiritual Beliefs that Impact Treatment  No   Chronic Pain  No          Care Plan: Diabetes   Updates made since 7/2/2021 12:00 AM      Problem: DIET MANAGEMENT       Goal: Learn to Manage Calories       Task: Provide resources for diabetic diet Completed 7/2/2021   Responsible User: Mavis Miranda RN   Note:    Provided by phil babb     Task: Provide resources on counting calories Completed 7/2/2021    Responsible User: Mavis Miranda RN   Note:    Provided by lexy villarreal     Problem: HBA1C UNCONTROLLED       Goal: Establish Regular Follow-Ups with PCP       Task: Determine patient's next PCP visit Completed 7/2/2021   Responsible User: Mavis Miranda RN      Goal: Reduce HbA1c levels below 9%       Task: Educate patient on diet and exercise Completed 7/2/2021   Responsible User: Mavis Miranda RN      Task: Educate patient on regular BS checks Completed 7/2/2021   Responsible User: Mavis Miranda RN      Task: Discuss diabetes treatment plan with patient Completed 7/2/2021   Responsible User: Mavis Miranda RN      Problem: MEDICATION ADHERENCE       Goal: Consistently take medications as prescribed       Task: Discuss barriers to medication adherence with patient Completed 7/2/2021   Responsible User: Mavis Miranda RN   Note:    Pt on insulin pump     Problem: PATIENT IS INACTIVE       Goal: Exercise at least 20 minutes per day       Task: Develop exercise plan with patient Completed 7/2/2021   Responsible User: Mavis Miranda RN   Note:    Pt has knee pain, having a  knee replacement this month         Mavis Miranda RN  Ambulatory Case Management    7/2/2021, 09:48 EDT

## 2021-07-06 NOTE — OUTREACH NOTE
Ambulatory Case Management Note    CCM Interim Update    Pt had not emailed me back yet with BP and BS log. I sent him another email.     Also, PCP needed to addend the office note with hospital bed verbiage in order to send order to [facility]. Faxed order, demo, and note to [facility].      There are no recently modified care plans to display for this patient.      Mavis Miranda RN  Ambulatory Case Management    7/6/2021, 13:22 EDT

## 2021-07-06 NOTE — SIGNIFICANT NOTE
REPORTS THAT UNSURE WHERE HE WANTS TO DO PHYSICAL THERAPY BUT WANTS IT SOMEWHERE IN [place]. ALSO REQUESTS TO SEE IF CAN GET HOSPITAL BED D/T STEPS UP TO HIS BEDROOM. MESSAGE SENT VIA [employer] TO [facility] [place].

## 2021-07-06 NOTE — DISCHARGE INSTRUCTIONS
IMPORTANT INSTRUCTIONS - PRE-ADMISSION TESTING  1. DO NOT EAT OR CHEW anything after midnight the night before your procedure.    2. You may have CLEAR liquids up to __3____ hours prior to ARRIVAL time.   3. Take the following medications the morning of your procedure with JUST A SIP OF WATER:  ______________CARVEDILOL, CETIRIZINE, AND DECREASE INSULIN PUMP BY 10%_USE INHALER AND BRING WITH YOU, MAY ALSO USE NOSE SPRAY________________________________________________________________________________________________________________________________________________________________    4. DO NOT BRING your medications to the hospital with you, UNLESS something has changed since your PRE-Admission Testing appointment.  5. Hold all vitamins, supplements, and NSAIDS (Non- steroidal anti-inflammatory meds) for one week prior to surgery (you MAY take Tylenol or Acetaminophen).  6. If you are diabetic, check your blood sugar the morning of your procedure. If it is less than 70 or if you are feeling symptomatic, call the following number for further instructions: [phone]  7. Use your inhalers/nebulizers as usual, the morning of your procedure. BRING YOUR INHALERS with you.   8. Bring your CPAP or BIPAP to hospital, ONLY IF YOU WILL BE SPENDING THE NIGHT.   9. Make sure you have a ride home and have someone who will stay with you the day of your procedure after you go home.  10. If you have any questions, please call your Pre-Admission Testing Nurse, ___JOHN_____________ at [phone] _[phone]___________.   11. Per anesthesia request, do not smoke for 24 hours before your procedure or as instructed by your surgeon.   12. DRINK 20 OZ GATORADE ZERO NO RED 3 HOURS PRIOR TO ARRIVAL.  13. WILL CALL DAY BEFORE PROCEDURE AND GIVE OFFICIAL ARRIVAL TIME  14. BATHING INSTRUCTIONS ARE INCLUDED IN TOTAL JOINT BOOK AND WILL BE GIVEN SOAP IN CLASS  ••••••Clear Liquid Diet        Find out when you need to start a clear liquid diet.    Think of “clear liquids” as anything you could read a newspaper through. This includes things like water, broth, sports drinks, or tea WITHOUT any kind of milk or cream.           Once you are told to start a clear liquid diet, only drink these things until 2 hours before arrival to the hospital or when the hospital says to stop. Total volume limitation: 8 oz.       Clear liquids you CAN drink:   Water   Clear broth: beef, chicken, vegetable, or bone broth with nothing in it   Gatorade   Lemonade or Mauricio-aid   Soda   Tea, coffee (NO cream or honey)   Jell-O (without fruit)   Popsicles (without fruit or cream)   Italian ices   Juice without pulp: apple, white, grape   You may use salt, pepper, and sugar    Do NOT drink:   Milk or cream   Soy milk, almond milk, coconut milk, or other non-dairy drinks and   creamers   Milkshakes or smoothies   Tomato juice   Orange juice   Grapefruit juice   Cream soups or any other than broth         Clear Liquid Diet:  Do NOT eat any solid food.  Do NOT eat or suck on mints or candy.  Do NOT chew gum.  Do NOT drink thick liquids like milk or juice with pulp in it.  Do NOT add milk, cream, or anything like soy milk or almond milk to coffee or tea.   15.

## 2021-07-14 NOTE — NURSING NOTE
"UPON ARRIVAL, PT STATES HIS BLOOD SUGARS HAVE BEEN OUT OF CONTROL OVER SEVERAL WEEKS; PT AND SON STATE PT HAS NOT BEEN FEELING WELL THE PAST FEW DAYS  WAS VOMITING YESTERDAY. PT'S SON WAS CONCERNED AND THOUGHT THAT MAYBE THEY SHOULD CANCEL SURGERY.    DR VALENCIA NOTIFIED ME VIA TELEPHONE AT 0930 REGARDING CONCERNS WITH PT A1C LEVEL OF 8.6 AND THAT PT STATES HIS SUGARS HAVE BEEN \"ALL OVER THE PLACE\".  REQUESTED PERFORM POC GLUCOSE;  DR VALENCIA WOULD LIKE ME TO INFORM DR TRAVIS REGARDING PT STATUS AND FOR DR TRAVIS TO EVALUATE READINESS FOR SURGER; I PERFORMED BLOOD GLUCOSE AT 0937. RESULTS 366.     DR VALENCIA VISITED PT AT 1000; MD DISCUSSED CONCERNS WITH SUGAR LEVELS AND THAT THERE WAS A FRESH SCRATCH ON THE SITE WHERE HE WOULD BE PERFORMING SURGERY; MD STATED WE COULD RESCHEDULE THE SURGERY AFTER THESE FINDINGS; PT WAS CERTAIN AT THAT TIME HE WANTED TO CONTINUE.    AT 1005, DR TRAVIS WAS NOTIFIED IN PERSON, PT BLOOD SUGAR ; PT WAS SEEN BY DR TRAVIS THEREAFTER; PT INFORMED DR TRAVIS HE ATE [place] TOAST AND MILK AT 0730.  PT WAS INFORMED HE WOULD NEED TO WAIT UNTIL LATER TODAY FOR SURGERY DUE TO CONSUMING FOOD.    DR TRAVIS DISCUSSED CONCERNS WITH PT BLOOD SUGAR INSTABILITY THAT COULD AFFECT HIS POST-OPERATIVE OUTCOME; PT AND SON LEFT ALONE TO DECIDE WHETHER OR NOT TO CANCEL SURGERY.    PT AND SON DECIDED TO CANCEL SURGERY AT THIS TIME; PT WAS ENCOURAGED TO FOLLOW UP WITH FAMILY PCP   "

## 2021-07-19 PROBLEM — M17.0 DEGENERATIVE ARTHRITIS OF KNEE, BILATERAL: Status: ACTIVE | Noted: 2021-01-01

## 2021-07-22 PROBLEM — J18.9 PNEUMONIA OF BOTH LOWER LOBES DUE TO INFECTIOUS ORGANISM: Status: ACTIVE | Noted: 2021-01-01

## 2021-07-22 NOTE — TELEPHONE ENCOUNTER
Cardiac Clearance and Medication Directive Request:    Procedure: Right knee replacement  Medication: Hold Aspirin.    Hx: HTN    Echo 8-4-20:  Normal heart function. Mild aortic stenosis/regurg.  Lexiscan 8-19-20: normal    Last seen on 6/29/21.    OK to clear patient and hold ASA?

## 2021-07-24 PROBLEM — U07.1 COVID-19 VIRUS INFECTION: Status: ACTIVE | Noted: 2021-01-01

## 2021-07-29 NOTE — OUTREACH NOTE
[facility] End of Month Documentation    This Chronic Medical Management Care Plan for Aleksandar Marks, 75 y.o. male, has been monitored and managed and a new plan of care implemented for the month of July.  A cumulative time of 27  minutes was spent on this patient record this month, including face to face with provider;chart review;electronic communication with other providers, electronic communication with pt.    Regarding the patient's problems: has Hypertension, essential; Shortness of breath; Arthritis; Cataract; High cholesterol; Type 1 diabetes mellitus (CMS/HCC); Vitamin B12 deficiency; Vitamin D deficiency; Osteoarthritis of right knee; Chronic obstructive pulmonary disease (CMS/HCC); Degenerative arthritis of knee, bilateral; Pneumonia of both lower lobes due to infectious organism; and COVID-19 virus infection on their problem list., the following items were addressed: medical records;medications;transitions to medical care and any changes can be found within the plan section of the note.  A detailed listing of time spent for chronic care management is tracked within each outreach encounter.  Current medications include:  has a current medication list which includes the following prescription(s): aspirin, carvedilol, cetirizine, fluticasone, trelegy ellipta, hydrochlorothiazide, insulin regular human, lisinopril, and simvastatin, and the following Facility-Administered Medications: alprazolam, arformoterol, aspirin, budesonide, dexamethasone, dextrose, dextrose, dextrose, enoxaparin, famotidine, fluticasone, furosemide, furosemide, glucagon (human recombinant), haloperidol lactate, insulin regular(human) in nacl, ipratropium-albuterol, lorazepam, melatonin, nitroglycerin, ondansetron **OR** ondansetron, oxycodone, [] sodium chloride **FOLLOWED BY** sodium chloride, sodium chloride, sodium chloride, sodium chloride, sodium chloride, and sodium chloridesodium chloride. and the patient is reported to be  patient is compliant with medication protocol,  Medications are reported to be effective.   All notes on chart for PCP to review.    The patient was monitored remotely for blood pressure;weight;activity level;blood glucose;medications.    The patient's physical needs include:  DME supplies;physical healthcare, wants a hospital bed.     The patient's mental support needs include:  not applicable    The patient's cognitive support needs include:  not applicable    The patient's psychosocial support needs include:  continued support    The patient's functional needs include: DME    The patient's environmental needs include:  n/a    Care Plan overall comments:  No data recorded    Refer to previous outreach notes for more information on the areas listed above.    Monthly Billing Diagnoses  (I10) Hypertension, essential    (E10.43) Type 1 diabetes mellitus with diabetic autonomic neuropathy (CMS/[facility])    Medications   · Medications have been reconciled    Care Plan progress this month:      Recently Modified Care Plans Updates made since 6/28/2021 12:00 AM     Diabetes         Problem Priority Last Modified     DIET MANAGEMENT --  7/2/2021  9:32 AM by Mavis Miranda RN              Goal Recent Progress Last Modified     Learn to Manage Calories --  7/2/2021  9:32 AM by Mavis Miranda RN              Task Due Date Last Modified     Provide resources for diabetic diet --  7/2/2021  9:32 AM by Mavis Miranda RN     Provided by phil babb       Provide resources on counting calories --  7/2/2021  9:33 AM by Mavis Miranda RN     Provided by lexy villarreal             Problem Priority Last Modified     HBA1C UNCONTROLLED --  7/2/2021  9:32 AM by Mavis Miranda RN              Goal Recent Progress Last Modified     Establish Regular Follow-Ups with PCP --  7/2/2021  9:32 AM by Mavis Miranda RN              Task Due Date Last Modified     Determine patient's next PCP visit --   7/2/2021  9:33 AM by Mavis Miranda RN              Goal Recent Progress Last Modified     Reduce HbA1c levels below 9% --  7/2/2021  9:32 AM by Mavis Miranda RN              Task Due Date Last Modified     Educate patient on diet and exercise --  7/2/2021  9:33 AM by Mavis Miranda RN        Educate patient on regular BS checks --  7/2/2021  9:33 AM by Mavis Miranda RN        Discuss diabetes treatment plan with patient --  7/2/2021  9:33 AM by Mavis Miranda RN              Problem Priority Last Modified     MEDICATION ADHERENCE --  7/2/2021  9:32 AM by Mavis Miranda RN              Goal Recent Progress Last Modified     Consistently take medications as prescribed --  7/2/2021  9:32 AM by Mavis Miranda RN              Task Due Date Last Modified     Discuss barriers to medication adherence with patient --  7/2/2021  9:37 AM by Mavis Miranda RN     Pt on insulin pump             Problem Priority Last Modified     PATIENT IS INACTIVE --  7/2/2021  9:32 AM by Mavis Miranda RN              Goal Recent Progress Last Modified     Exercise at least 20 minutes per day --  7/2/2021  9:32 AM by Mavis Miranda RN              Task Due Date Last Modified     Develop exercise plan with patient --  7/2/2021  9:38 AM by Mavis Miranda RN     Pt has knee pain, having a  knee replacement this month                       Instructions   · Patient was provided an electronic copy of care plan  · CCM services were explained and offered and patient has accepted these services.  · Patient has given their written consent to receive CCM services and understands that this includes the authorization of electronic communication of medical information with the other treating providers.  · Patient understands that they may stop CCM services at any time and these changes will be effective at the end of the calendar month and will effectively revocate the agreement of CCM  services.  · Patient understands that only one practitioner can furnish and be paid for CCM services during one calendar month.  Patient also understands that there may be co-payment or deductible fees in association with CCM services.  · Patient will continue with at least monthly follow-up calls with the Nurse Navigator.    Mavis Miranda RN  Ambulatory Case Management    7/29/2021, 09:48 EDT

## 2021-08-01 NOTE — PROGRESS NOTES
Pulmonary / Critical Care Progress Note      Patient Name: Aleksandar Marks  : 1945  MRN: [id number]  Attending:  Víctor Bustos MD   Date of admission: 2021    Subjective   Subjective   Patient critically ill with COVID-19 pneumonia, ARDS    Patient worse over the course the past 24 hours  Feels that he is smothering  Unable to use BiPAP  Oxygen saturations dropped into the 70s off BiPAP  States at this time that he was to be made comfortable  Remains on insulin drip    Review of Systems  Constitutional symptoms:   Fatigue, otherwise denied complaints   Ear, nose, throat: Denied complaints  Cardiovascular:  Denied complaints  Respiratory: Dyspnea, cough, otherwise denied complaints  Gastrointestinal: Denied complaints  Musculoskeletal: Weakness, otherwise denied complaints  Neurologic: Denied complaints  Skin: Denied complaints        Objective   Objective     Vitals:   Vital signs for last 24 hours:  Temp:  [97 °F (36.1 °C)-98.8 °F (37.1 °C)] 98.8 °F (37.1 °C)  Heart Rate:  [] 104  Resp:  [18-31] 30  BP: ()/(52-91) 90/71    Intake/Output last 3 shifts:  I/O last 3 completed shifts:  In: 1178.7 [P.O.:415; I.V.:763.7]  Out: 2800 [Urine:2800]  Intake/Output this shift:  No intake/output data recorded.    Vent settings for last 24 hours:       Hemodynamic parameters for last 24 hours:       Physical Exam   Vital Signs Reviewed   WDWN, Alert, NAD.    Hard of hearing  HEENT:  PERRL, EOMI.  OP, nares clear  Neck:  Supple, no JVD, no thyromegaly  Chest:  good aeration, coarse crackles and rhonchi bilaterally, tympanic to percussion bilaterally, pursed lip breathing noted on air Vo   CV: RRR, no MGR, pulses 2+, equal.  Abd:  Soft, NT, ND, + BS, no HSM, obese  EXT:  no clubbing, no cyanosis, trace BLE edema  Neuro:  A&Ox3, CN grossly intact, no focal deficits.  Skin: No rashes or lesions noted        Result Review    Result Review:  I have personally reviewed the results from the time of this admission  to 8/1/2021 14:14 EDT and agree with these findings:  [x]  Laboratory  [x]  Microbiology  []  Radiology  [x]  EKG/Telemetry   []  Cardiology/Vascular   []  Pathology  []  Old records  []  Other:      Assessment/Plan   Assessment / Plan     Active Hospital Problems:  Active Hospital Problems    Diagnosis    • **COVID-19 virus infection    • Pneumonia of both lower lobes due to infectious organism    · Hyperglycemia likely related to dexamethasone  · COVID-19 pneumonia  · Acute hypoxemic respiratory failure  requiring air Vo  · ARDS  · Hypokalemia  · COPD with acute exacerbation  · Therapeutic drug monitoring of remdesivir  · Type 2 diabetes with hyperglycemia  · MONIE on admission  · Hypertension  · Altered mental status  · Toxic/metabolic encephalopathy  · Obesity with BMI 35.40     PLAN:  Extensive discussion had with the patient as well as his son today  At this time I have recommended the patient be made comfort as he appears to be struggling  Has received max treatment for COVID-19 and is decompensated despite this plan the patient states that he does not want to be intubated I explained to him that without intubation that he will die he understands and states that he just wants to be made comfortable.  Spoke with the son who is also understanding and agreement with this  We will initiate comfort measures  Continue oxygen for comfort at this time    Short time later comfort measures was initiated and the patient passed    DVT prophylaxis:  Mechanical DVT prophylaxis orders are present.    CODE STATUS:   Code Status: No CPR  Medical Interventions (Level of Support Prior to Arrest): Comfort Measures    Patient has been waxing and waning his CODE STATUS and his wishes will have palliative care readdressed with patient today if he does not he want to be a full code or a DNI or a intubate if needed    Patient still has very tenuous respiratory status requiring maximal support with noninvasive ventilation    The patient  is critically ill in the ICU with COVID-19 pneumonia, ARDS, acute hypoxemic respiratory failure, hypokalemia. Multidisciplinary bedside critical care rounds were performed with nursing staff, respiratory therapy, pharmacy, nutritional services, social work. I have personally reviewed the chart, labs and any pertinent imaging available.  I have spent 31 minutes of critical care time, excluding procedures, in the care of this patient a large portion of this time was spent having a conversation with the patient as well as his family regarding his poor overall prognosis      Electronically signed by John Gauthier DO, 08/01/21, 2:15 PM EDT.

## 2021-08-01 NOTE — DISCHARGE SUMMARY
[facility]         HOSPITALIST DEATH SUMMARY    Patient Name: Aleksandar Marks  : 1945  MRN: [id number]    Date of Admission: 2021  Time of Death: 2021 at 1302  Primary Care Physician: Demar Cao APRN    Consults     Date and Time Order Name Status Description    2021  6:40 AM Inpatient Pulmonology Consult Completed     2021 10:12 PM IP General Consult (Use specialty-specific consult if known) Completed         PROBLEM LIST   Acute hypoxic respiratory failure   ARDS  COVID-19 pneumonia  Hyperglycemia requiring insulin drip  Questionable community-acquired pneumonia  COPD with acute exacerbation  Hypoglycemia  Type 2 diabetes mellitus with insulin pump and peripheral neuropathy  Weakness  MONIE  Hypertension  Hypophosphatemia  Therapeutic drug monitoring while on remdesivir  Obesity with BMI 35.4    Hospital Course     Hospital Course:    Aleksandar Marks is a 75 y.o. male with PMH COPD, DM, HLD, HTN who presents to the emergency room with weakness for the past 3 days and difficulty ambulating.  Additionally patient has a cough and fevers.  Patient had a negative Covid test the week prior, has not had the Covid vaccine.  On arrival to the ED he was found to have leukocytosis and mild infiltrate seen on chest x-ray.Tested positive for Covid and was started on dexamethasone and remdesivir.  Had worsening hypoxia and hyperglycemia necessitating transfer to the ICU, pulmonology was consulted.  He was started on insulin drip and has been maxed out on airVO with nonrebreather.  Given Actemra on .  Patient eventually required continuous BiPAP to maintain saturations greater than 88%.  Patient had been adamant during his admission that he was a DNI.  Following several days of worsening oxygenation, patient decided to go forward with comfort measures only.  Patient passed on 2021 at 1302     Electronically signed by Víctor Bustos MD, 21, 1:37 PM EDT.

## 2021-08-01 NOTE — PLAN OF CARE
Goal Outcome Evaluation:         Required significant amount of oxygenation. Oxygen level drop to 60% when off the bipap for fluids. Discussed intubation possibility with patient. He adamantly refused to intubation even it meant death per his words. He stated understands the risks. Bipap continued at 100% oxygen rate. Patient does not do well on his back. Oxygen rates better when he is on his side.

## 2021-08-01 NOTE — PROGRESS NOTES
[facility]   Hospitalist Progress Note  Date: 2021  Patient Name: Aleksandar Marks  : 1945  MRN: [id number]  Date of admission: 2021      Subjective   Subjective     Chief Complaint: Weakness, fever    Summary:   Aleksandar Marks is a 75 y.o. male with PMH COPD, DM, HLD, HTN who presents to the emergency room with weakness for the past 3 days and difficulty ambulating.  Additionally patient has a cough and fevers.  Patient had a negative Covid test last week, has not had the Covid vaccine.  On arrival to the ED he was found to have leukocytosis and mild infiltrate seen on chest x-ray.  Had hypoglycemia on arrival.  For these reasons he was admitted for further care, started on antibiotics and frequent bronchodilators.  Hypoglycemia was stabilized.  Tested positive for Covid and was started on dexamethasone and remdesivir.  Had worsening hypoxia and hyperglycemia necessitating transfer to the ICU, pulmonology was consulted.  He was started on insulin drip and has been maxed out on airVO with nonrebreather.  Given Actemra on .  Prescribed BiPAP but patient continuously removes it.  States he is DNI.      Interval Followup:   Patient had a rough night overnight.  Patient was unable to tolerate BiPAP being taken off, required to stay on his side, patient laid on his back his O2 sats would drop.  Patient quite agitated and tired of being on BiPAP.  Patient was close to being intubated overnight, however continue to persist saying that he did not want to be intubated under any circumstances.  On rounds this morning patient again voiced that he did not want to be intubated.  Later discussions with intensivist, decided to make comfort care.  Patient requesting that we take away all the supplemental oxygen and simply focus on comfort.  Comfort care measure orders have been placed, family has been called    Review of Systems   Denies chest pain, nausea, diarrhea.  Positive for dyspnea    Objective    Objective     Vitals:   Temp:  [97 °F (36.1 °C)-98.8 °F (37.1 °C)] 98.8 °F (37.1 °C)  Heart Rate:  [] 121  Resp:  [18-31] 30  BP: ()/(52-91) 90/71  Physical Exam    Constitutional: Appears fatigued, awake, alert, no increased work of breathing with BiPAP in place   Eyes: Pupils equal, sclerae anicteric, no conjunctival injection   HENT: NCAT, mucous membranes moist   Neck: Supple, no thyromegaly, no lymphadenopathy, trachea midline    Respiratory: Coarse crackles and rhonchi's bilaterally, BiPAP in place    Cardiovascular: RRR, no murmurs, rubs, or gallops   Gastrointestinal: Positive bowel sounds, soft, nontender, nondistended   Musculoskeletal: No bilateral ankle edema, no clubbing or cyanosis to extremities   Psychiatric: Appropriate affect, cooperative     Neurologic: strength symmetric in all extremities, Cranial Nerves grossly intact to confrontation, speech clear   Skin: No rashes     Result Review    Result Review:  I have personally reviewed the results from the time of this admission to 8/1/2021 11:19 EDT and agree with these findings:  [x]  Laboratory  [x]  Microbiology  [x]  Radiology  [x]  EKG/Telemetry   []  Cardiology/Vascular   []  Pathology  []  Old records  []  Other:  Telemetry reviewed showing normal sinus rhythm    Assessment/Plan   Assessment / Plan     Assessment/Plan:  Acute hypoxic respiratory failure requiring Airvo  ARDS  COVID-19 pneumonia  Hyperglycemia requiring insulin drip  Questionable community-acquired pneumonia  COPD with acute exacerbation  Hypoglycemia  Type 2 diabetes mellitus with insulin pump and peripheral neuropathy  Weakness  MONIE  Hypertension  Hypophosphatemia  Therapeutic drug monitoring while on remdesivir  Obesity with BMI 35.4    Plan:  Changing patient to comfort care based on his wishes  Patient now has DNR/DNI status updated in his chart  All medicines will be geared towards patient's comfort only  Patient's family's has been called    Discussed plan  with RN, pulmonology     DVT prophylaxis:  Mechanical DVT prophylaxis orders are present.    CODE STATUS:   Code Status: No CPR  Medical Interventions (Level of Support Prior to Arrest): Comfort Measures    Electronically signed by Víctor Bustos MD, 08/01/21, 11:20 AM EDT.

## 2021-08-02 LAB
GLUCOSE BLDC GLUCOMTR-MCNC: 109 MG/DL (ref 70–99)
GLUCOSE BLDC GLUCOMTR-MCNC: 118 MG/DL (ref 70–99)
GLUCOSE BLDC GLUCOMTR-MCNC: 173 MG/DL (ref 70–99)
GLUCOSE BLDC GLUCOMTR-MCNC: 174 MG/DL (ref 70–99)

## 2021-08-04 ENCOUNTER — APPOINTMENT (OUTPATIENT)
Dept: DIABETES SERVICES | Facility: HOSPITAL | Age: 76
End: 2021-08-04

## 2021-08-11 ENCOUNTER — APPOINTMENT (OUTPATIENT)
Dept: DIABETES SERVICES | Facility: HOSPITAL | Age: 76
End: 2021-08-11

## (undated) DEVICE — STRYKER PERFORMANCE SERIES SAGITTAL BLADE: Brand: STRYKER PERFORMANCE SERIES

## (undated) DEVICE — SOL IRR NACL 0.9PCT 3000ML

## (undated) DEVICE — 3M™ STERI-DRAPE™ U-DRAPE 1015: Brand: STERI-DRAPE™

## (undated) DEVICE — TOTAL KNEE-LF: Brand: MEDLINE INDUSTRIES, INC.

## (undated) DEVICE — NO-SCRATCH ™ SMALL WHITNEY CURETTE ™ IS A SINGLE-USE, PLASTIC CURETTE FOR QUICKLY APPLYING, MANIPULATING AND REMOVING BONE CEMENT DURING HIP AND KNEE REPLACEMENT SURGERY. THE PLASTIC IS SOFTER THAN STEEL INSTRUMENTS, REDUCING THE RISK OF DAMAGING THE PROSTHESIS WITH METAL INSTRUMENTS.  THE CURETTE’S 6MM TIP REMOVES EXCESS CEMENT FROM REPLACEMENT HIPS AND KNEES. EASY-TO-MANEUVER, THE SMALL BLUE CURETTE LETS YOU REMOVE CEMENT FROM ALL EDGES OF THE PROSTHESIS.NO-SCRATCH WHITNEY SMALL CURETTE FEATURES:SAFER THAN STEEL- MADE OF PLASTIC - STURDY YET SOFTER THAN SURGICAL STEEL.HANDIER- EACH TOOL HAS A MOLDED-IN THUMB INDENTATION INSTANTLY ORIENTING THE TOOL.- EASIER TO MANEUVER IN HARD TO SEE PLACES.- COLOR-CODED FOR EASY IDENTIFICATION.FASTER- COMES INDIVIDUALLY PACKAGED IN STERILE, PEEL OPEN POUCH, READY TO GO.- APPLIES, MANIPULATES, OR REMOVES CEMENT WITH FINGERTIP PRECISION.ECONOMICAL- THE COST OF A SINGLE REVISION DWARFS THE COST OF A SINGLE-USE CURETTE. - DISPOSABLE – THERE’S NO NEED TO WASTE TIME REMOVING HARDENED CEMENT OR RE-STERILIZING TOOLS.- LESS EXPENSIVE TO BUY AND INVENTORY - ORDER ONLY THE TOOL YOU USE.- PACKAGED 25 INDIVIDUALLY WRAPPED TOOLS TO A CARTON FOR CONVENIENT SHELF STORAGE.: Brand: WHITNEY NO-SCRATCH CURETTE (SMALL)

## (undated) DEVICE — NDL HYPO ECLPS SFTY 18G 1 1/2IN

## (undated) DEVICE — ANTIBACTERIAL VIOLET BRAIDED (POLYGLACTIN 910), SYNTHETIC ABSORBABLE SURGICAL SUTURE: Brand: COATED VICRYL

## (undated) DEVICE — DISPOSABLE TOURNIQUET CUFF SINGLE BLADDER, SINGLE PORT AND QUICK CONNECT CONNECTOR: Brand: COLOR CUFF

## (undated) DEVICE — GLV SURG SENSICARE PI ORTHO SZ8 LF STRL

## (undated) DEVICE — PULLOVER TOGA, 2X LARGE: Brand: FLYTE, SURGICOOL

## (undated) DEVICE — SUT VIC 2/0 CT1 36IN

## (undated) DEVICE — 3 BONE CEMENT MIXER: Brand: MIXEVAC

## (undated) DEVICE — CVR LEG BOOTLEG F/R NOSKID 33IN

## (undated) DEVICE — 450 ML BOTTLE OF 0.05% CHLORHEXIDINE GLUCONATE IN 99.95% STERILE WATER FOR IRRIGATION, USP AND APPLICATOR.: Brand: IRRISEPT ANTIMICROBIAL WOUND LAVAGE

## (undated) DEVICE — BNDG COTN/ELAS FLEXMASTER DBL/LENGTH CLIP/CLS 6IN 11YD

## (undated) DEVICE — INTENDED FOR TISSUE SEPARATION, AND OTHER PROCEDURES THAT REQUIRE A SHARP SURGICAL BLADE TO PUNCTURE OR CUT.: Brand: BARD-PARKER ® CARBON RIB-BACK BLADES

## (undated) DEVICE — MAT FLR ABS W/BLU/LINER 56X72IN WHT

## (undated) DEVICE — UNDERCAST PADDING: Brand: DEROYAL

## (undated) DEVICE — FAN SPRAY KIT: Brand: PULSAVAC®

## (undated) DEVICE — SLV SCD LEG COMFORT KENDALLSCD MD REPROC

## (undated) DEVICE — SYR LUERLOK 30CC

## (undated) DEVICE — GLV SURG SENSICARE SLT PF LF 8 STRL

## (undated) DEVICE — DRSNG PAD ABD 8X10IN STRL

## (undated) DEVICE — APPL CHLORAPREP HI/LITE 26ML ORNG

## (undated) DEVICE — STERILE POLYISOPRENE POWDER-FREE SURGICAL GLOVES: Brand: PROTEXIS

## (undated) DEVICE — TOWEL,OR,DSP,ST,BLUE,STD,4/PK,20PK/CS: Brand: MEDLINE